# Patient Record
Sex: FEMALE | Race: OTHER | HISPANIC OR LATINO | URBAN - METROPOLITAN AREA
[De-identification: names, ages, dates, MRNs, and addresses within clinical notes are randomized per-mention and may not be internally consistent; named-entity substitution may affect disease eponyms.]

---

## 2022-03-25 ENCOUNTER — EMERGENCY (EMERGENCY)
Facility: HOSPITAL | Age: 27
LOS: 0 days | Discharge: HOME | End: 2022-03-25
Attending: EMERGENCY MEDICINE | Admitting: EMERGENCY MEDICINE
Payer: COMMERCIAL

## 2022-03-25 VITALS
SYSTOLIC BLOOD PRESSURE: 157 MMHG | RESPIRATION RATE: 18 BRPM | DIASTOLIC BLOOD PRESSURE: 98 MMHG | TEMPERATURE: 98 F | OXYGEN SATURATION: 97 % | HEART RATE: 87 BPM

## 2022-03-25 DIAGNOSIS — G43.909 MIGRAINE, UNSPECIFIED, NOT INTRACTABLE, WITHOUT STATUS MIGRAINOSUS: ICD-10-CM

## 2022-03-25 LAB
ALBUMIN SERPL ELPH-MCNC: 4.3 G/DL — SIGNIFICANT CHANGE UP (ref 3.5–5.2)
ALP SERPL-CCNC: 127 U/L — HIGH (ref 30–115)
ALT FLD-CCNC: 41 U/L — SIGNIFICANT CHANGE UP (ref 0–41)
ANION GAP SERPL CALC-SCNC: 12 MMOL/L — SIGNIFICANT CHANGE UP (ref 7–14)
AST SERPL-CCNC: 25 U/L — SIGNIFICANT CHANGE UP (ref 0–41)
BASOPHILS # BLD AUTO: 0.05 K/UL — SIGNIFICANT CHANGE UP (ref 0–0.2)
BASOPHILS NFR BLD AUTO: 0.6 % — SIGNIFICANT CHANGE UP (ref 0–1)
BILIRUB SERPL-MCNC: 0.4 MG/DL — SIGNIFICANT CHANGE UP (ref 0.2–1.2)
BUN SERPL-MCNC: 10 MG/DL — SIGNIFICANT CHANGE UP (ref 10–20)
CALCIUM SERPL-MCNC: 9.2 MG/DL — SIGNIFICANT CHANGE UP (ref 8.5–10.1)
CHLORIDE SERPL-SCNC: 104 MMOL/L — SIGNIFICANT CHANGE UP (ref 98–110)
CO2 SERPL-SCNC: 20 MMOL/L — SIGNIFICANT CHANGE UP (ref 17–32)
CREAT SERPL-MCNC: 0.6 MG/DL — LOW (ref 0.7–1.5)
EGFR: 127 ML/MIN/1.73M2 — SIGNIFICANT CHANGE UP
EOSINOPHIL # BLD AUTO: 0.19 K/UL — SIGNIFICANT CHANGE UP (ref 0–0.7)
EOSINOPHIL NFR BLD AUTO: 2.4 % — SIGNIFICANT CHANGE UP (ref 0–8)
GLUCOSE SERPL-MCNC: 100 MG/DL — HIGH (ref 70–99)
HCG SERPL QL: NEGATIVE — SIGNIFICANT CHANGE UP
HCT VFR BLD CALC: 41.8 % — SIGNIFICANT CHANGE UP (ref 37–47)
HGB BLD-MCNC: 14.3 G/DL — SIGNIFICANT CHANGE UP (ref 12–16)
IMM GRANULOCYTES NFR BLD AUTO: 0.6 % — HIGH (ref 0.1–0.3)
LYMPHOCYTES # BLD AUTO: 2.01 K/UL — SIGNIFICANT CHANGE UP (ref 1.2–3.4)
LYMPHOCYTES # BLD AUTO: 25.2 % — SIGNIFICANT CHANGE UP (ref 20.5–51.1)
MCHC RBC-ENTMCNC: 29.9 PG — SIGNIFICANT CHANGE UP (ref 27–31)
MCHC RBC-ENTMCNC: 34.2 G/DL — SIGNIFICANT CHANGE UP (ref 32–37)
MCV RBC AUTO: 87.3 FL — SIGNIFICANT CHANGE UP (ref 81–99)
MONOCYTES # BLD AUTO: 0.66 K/UL — HIGH (ref 0.1–0.6)
MONOCYTES NFR BLD AUTO: 8.3 % — SIGNIFICANT CHANGE UP (ref 1.7–9.3)
NEUTROPHILS # BLD AUTO: 5.01 K/UL — SIGNIFICANT CHANGE UP (ref 1.4–6.5)
NEUTROPHILS NFR BLD AUTO: 62.9 % — SIGNIFICANT CHANGE UP (ref 42.2–75.2)
NRBC # BLD: 0 /100 WBCS — SIGNIFICANT CHANGE UP (ref 0–0)
PLATELET # BLD AUTO: 259 K/UL — SIGNIFICANT CHANGE UP (ref 130–400)
POTASSIUM SERPL-MCNC: 4.5 MMOL/L — SIGNIFICANT CHANGE UP (ref 3.5–5)
POTASSIUM SERPL-SCNC: 4.5 MMOL/L — SIGNIFICANT CHANGE UP (ref 3.5–5)
PROT SERPL-MCNC: 7 G/DL — SIGNIFICANT CHANGE UP (ref 6–8)
RBC # BLD: 4.79 M/UL — SIGNIFICANT CHANGE UP (ref 4.2–5.4)
RBC # FLD: 12.1 % — SIGNIFICANT CHANGE UP (ref 11.5–14.5)
SODIUM SERPL-SCNC: 136 MMOL/L — SIGNIFICANT CHANGE UP (ref 135–146)
WBC # BLD: 7.97 K/UL — SIGNIFICANT CHANGE UP (ref 4.8–10.8)
WBC # FLD AUTO: 7.97 K/UL — SIGNIFICANT CHANGE UP (ref 4.8–10.8)

## 2022-03-25 PROCEDURE — 99284 EMERGENCY DEPT VISIT MOD MDM: CPT

## 2022-03-25 RX ORDER — METOCLOPRAMIDE HCL 10 MG
10 TABLET ORAL ONCE
Refills: 0 | Status: COMPLETED | OUTPATIENT
Start: 2022-03-25 | End: 2022-03-25

## 2022-03-25 RX ORDER — METOCLOPRAMIDE HCL 10 MG
1 TABLET ORAL
Qty: 120 | Refills: 0
Start: 2022-03-25 | End: 2022-04-23

## 2022-03-25 RX ORDER — KETOROLAC TROMETHAMINE 30 MG/ML
15 SYRINGE (ML) INJECTION ONCE
Refills: 0 | Status: DISCONTINUED | OUTPATIENT
Start: 2022-03-25 | End: 2022-03-25

## 2022-03-25 RX ORDER — SODIUM CHLORIDE 9 MG/ML
1000 INJECTION, SOLUTION INTRAVENOUS ONCE
Refills: 0 | Status: COMPLETED | OUTPATIENT
Start: 2022-03-25 | End: 2022-03-25

## 2022-03-25 RX ADMIN — Medication 15 MILLIGRAM(S): at 15:28

## 2022-03-25 RX ADMIN — Medication 104 MILLIGRAM(S): at 15:28

## 2022-03-25 RX ADMIN — SODIUM CHLORIDE 1000 MILLILITER(S): 9 INJECTION, SOLUTION INTRAVENOUS at 15:28

## 2022-03-25 NOTE — ED PROVIDER NOTE - NSFOLLOWUPCLINICS_GEN_ALL_ED_FT
[facility]  Neurology  [address] [place] [address], [address]  [place] [address]  Phone: [phone]  Fax:

## 2022-03-25 NOTE — ED PROVIDER NOTE - PATIENT PORTAL LINK FT
You can access the FollowMyHealth Patient Portal offered by [facility] by registering at the following website: [email or web address]. By joining [employer]’s FollowMyHealth portal, you will also be able to view your health information using other applications (apps) compatible with our system.

## 2022-03-25 NOTE — ED PROVIDER NOTE - CLINICAL SUMMARY MEDICAL DECISION MAKING FREE TEXT BOX
26 female here for headache. Had screening labs imaging medications and reevaluation, no acute emergent findings, symptoms improved, plan discussed with patient, will discharge with outpatient management and return and follow up instructions.

## 2022-03-25 NOTE — ED PROVIDER NOTE - ATTENDING CONTRIBUTION TO CARE
I personally evaluated the patient. I reviewed the Resident’s or Physician Assistant’s note (as assigned above), and agree with the findings and plan except as documented in my note.     26 female here for eval of headache. Admits to known migraine being followed by outpatient neuro + monthly Rx. Exacerbated by lights and sounds. Refractory to outpatient Rx. Admits to sleep deprivation.     No other symptoms.     ROS otherwise unremarkable    PE: female in no distress. CV: pulses intact. CHEST: normal work of breathing. ABD: nondistended. SKIN: normal. EXT: FROM. NEURO: AAO 3 no focal deficits. HEENT: no droop EOMI no photophobia    Impression: headache    Plan: labs Rx supportive care and reevaluation

## 2022-03-25 NOTE — ED PROVIDER NOTE - OBJECTIVE STATEMENT
26y F pmh migraines presenting with migraine on/off x3 weeks. Pt endorses to getting headaches every day. Exacerbated by lights/sounds. Pt says she saw a neurologist in [place], but has not followed up since. No f/c/n/v. No cough/sore throat. No congestion.

## 2022-03-25 NOTE — ED PROVIDER NOTE - PROGRESS NOTE DETAILS
Pain has resolved. Pt endorses to feeling better and wants to go home. Given strict return precautions and encouraged to follow up with neurology -friedman

## 2022-04-02 ENCOUNTER — EMERGENCY (EMERGENCY)
Facility: HOSPITAL | Age: 27
LOS: 0 days | Discharge: HOME | End: 2022-04-02
Attending: EMERGENCY MEDICINE | Admitting: EMERGENCY MEDICINE
Payer: COMMERCIAL

## 2022-04-02 VITALS
RESPIRATION RATE: 18 BRPM | SYSTOLIC BLOOD PRESSURE: 129 MMHG | DIASTOLIC BLOOD PRESSURE: 63 MMHG | TEMPERATURE: 98 F | OXYGEN SATURATION: 98 % | HEART RATE: 62 BPM

## 2022-04-02 VITALS
RESPIRATION RATE: 16 BRPM | WEIGHT: 184.97 LBS | DIASTOLIC BLOOD PRESSURE: 98 MMHG | TEMPERATURE: 98 F | OXYGEN SATURATION: 99 % | HEART RATE: 73 BPM | SYSTOLIC BLOOD PRESSURE: 171 MMHG

## 2022-04-02 DIAGNOSIS — F41.9 ANXIETY DISORDER, UNSPECIFIED: ICD-10-CM

## 2022-04-02 DIAGNOSIS — R51.9 HEADACHE, UNSPECIFIED: ICD-10-CM

## 2022-04-02 DIAGNOSIS — G43.909 MIGRAINE, UNSPECIFIED, NOT INTRACTABLE, WITHOUT STATUS MIGRAINOSUS: ICD-10-CM

## 2022-04-02 PROCEDURE — 99284 EMERGENCY DEPT VISIT MOD MDM: CPT

## 2022-04-02 RX ORDER — METOCLOPRAMIDE HCL 10 MG
10 TABLET ORAL ONCE
Refills: 0 | Status: COMPLETED | OUTPATIENT
Start: 2022-04-02 | End: 2022-04-02

## 2022-04-02 RX ORDER — KETOROLAC TROMETHAMINE 30 MG/ML
30 SYRINGE (ML) INJECTION ONCE
Refills: 0 | Status: DISCONTINUED | OUTPATIENT
Start: 2022-04-02 | End: 2022-04-02

## 2022-04-02 RX ORDER — KETOROLAC TROMETHAMINE 30 MG/ML
15 SYRINGE (ML) INJECTION ONCE
Refills: 0 | Status: DISCONTINUED | OUTPATIENT
Start: 2022-04-02 | End: 2022-04-02

## 2022-04-02 RX ORDER — SODIUM CHLORIDE 9 MG/ML
1000 INJECTION, SOLUTION INTRAVENOUS ONCE
Refills: 0 | Status: DISCONTINUED | OUTPATIENT
Start: 2022-04-02 | End: 2022-04-02

## 2022-04-02 RX ORDER — DIPHENHYDRAMINE HCL 50 MG
50 CAPSULE ORAL ONCE
Refills: 0 | Status: COMPLETED | OUTPATIENT
Start: 2022-04-02 | End: 2022-04-02

## 2022-04-02 RX ORDER — ACETAMINOPHEN 500 MG
650 TABLET ORAL ONCE
Refills: 0 | Status: COMPLETED | OUTPATIENT
Start: 2022-04-02 | End: 2022-04-02

## 2022-04-02 RX ORDER — METOCLOPRAMIDE HCL 10 MG
10 TABLET ORAL ONCE
Refills: 0 | Status: DISCONTINUED | OUTPATIENT
Start: 2022-04-02 | End: 2022-04-02

## 2022-04-02 RX ADMIN — Medication 650 MILLIGRAM(S): at 20:59

## 2022-04-02 RX ADMIN — Medication 50 MILLIGRAM(S): at 22:10

## 2022-04-02 RX ADMIN — Medication 30 MILLIGRAM(S): at 21:20

## 2022-04-02 RX ADMIN — Medication 10 MILLIGRAM(S): at 21:21

## 2022-04-02 RX ADMIN — Medication 30 MILLIGRAM(S): at 22:02

## 2022-04-02 RX ADMIN — Medication 650 MILLIGRAM(S): at 22:03

## 2022-04-02 NOTE — ED PROVIDER NOTE - PATIENT PORTAL LINK FT
You can access the FollowMyHealth Patient Portal offered by [facility] by registering at the following website: [email or web address]. By joining [employer]’s FollowMyHealth portal, you will also be able to view your health information using other applications (apps) compatible with our system.

## 2022-04-02 NOTE — ED ADULT NURSE NOTE - CHIEF COMPLAINT QUOTE
Headache on and off since march but worsened today to 8/10 today. C/O of dizziness as well, NIH-0 in triage.

## 2022-04-02 NOTE — ED PROVIDER NOTE - NSFOLLOWUPCLINICS_GEN_ALL_ED_FT
[facility]  Neurology  [address] [place] [address], [facility] [address]  [place] [address]  Phone: [phone]  Fax:   Follow Up Time: 1-3 Days

## 2022-04-02 NOTE — ED PROVIDER NOTE - CLINICAL SUMMARY MEDICAL DECISION MAKING FREE TEXT BOX
27 yo woman with recurrent migraines presents with worsening headache similar to prior migraines.  Patient has been closely followed in [place] for her headaches.  However, just moved in [place] and currently has no follow up.  Neurologically intact.  Improved with meds.  Outpatient follow up provided.

## 2022-04-02 NOTE — ED PROVIDER NOTE - NS ED ROS FT
Constitutional: No fevers, chills, or malaise.  HEENT: Reports headache.   Cardiac:  No chest pain, SOB, leg edema, or leg pain.  Respiratory:  No cough, respiratory distress, or hemoptysis.  GI:  No nausea, vomiting, diarrhea, or abdominal pain.  :  No dysuria, frequency, or urgency.  MS:  No myalgia, muscle weakness, joint pain or back pain.  Neuro:  No dizziness, LOC, paralysis, or N/T.  Skin:  No skin rash.   Endocrine: No polyuria, polyphagia, or polydipsia.

## 2022-04-02 NOTE — ED ADULT TRIAGE NOTE - CHIEF COMPLAINT QUOTE
Headache since march but worsened to 8/10 today. C/O of dizziness as well, NIH-0 in triage. Headache on and off since march but worsened today to 8/10 today. C/O of dizziness as well, NIH-0 in triage.

## 2022-04-02 NOTE — ED PROVIDER NOTE - PROGRESS NOTE DETAILS
Scarlet: pt refusing IV, will give IM meds and reassess Scarlet: States headache improved, would like to be DCed

## 2022-04-02 NOTE — ED PROVIDER NOTE - PHYSICAL EXAMINATION
GENERAL: NAD   SKIN: warm, dry  HEAD: Normocephalic; atraumatic.  EYES: PERRLA, EOMI, no conjunctival erythema  ENT: No nasal discharge; airway clear.  NECK: Supple; non tender.  CARD: S1, S2 normal; no murmurs, gallops, or rubs. Regular rate and rhythm.   RESP: LCTAB; No wheezes, rales, rhonchi, or stridor.  NEURO: Alert, oriented, grossly unremarkable. Strength 5/5 and sensation intact in bilateral UE and LEs. CN2-12 grossly intact. Normal gait   PSYCH: Cooperative, appropriate.

## 2022-04-02 NOTE — ED PROVIDER NOTE - OBJECTIVE STATEMENT
27 yo female w/ PMH of migraines, anxiety presents for headache x 3 days. No inciting event or trauma, worse with light, radiates from back of head, similar to previous migraines she has had in the past. Was seen a week ago for similar complaint, was seeing neurologist in [place] but currently doesn't follow a neurologist.  States for past month migraines have been worse, 2 months ago stopped unknown antidepressant she was taking for anxiety.

## 2022-04-28 PROBLEM — Z00.00 ENCOUNTER FOR PREVENTIVE HEALTH EXAMINATION: Status: ACTIVE | Noted: 2022-04-28

## 2022-05-05 ENCOUNTER — APPOINTMENT (OUTPATIENT)
Dept: NEUROLOGY | Facility: CLINIC | Age: 27
End: 2022-05-05
Payer: COMMERCIAL

## 2022-05-05 VITALS
HEART RATE: 87 BPM | SYSTOLIC BLOOD PRESSURE: 156 MMHG | DIASTOLIC BLOOD PRESSURE: 109 MMHG | OXYGEN SATURATION: 99 % | TEMPERATURE: 97.8 F | HEIGHT: 64 IN | WEIGHT: 190 LBS | BODY MASS INDEX: 32.44 KG/M2

## 2022-05-05 PROCEDURE — 99215 OFFICE O/P EST HI 40 MIN: CPT

## 2022-05-05 RX ORDER — FLUOXETINE HYDROCHLORIDE 10 MG/1
10 CAPSULE ORAL
Qty: 30 | Refills: 1 | Status: DISCONTINUED | COMMUNITY
Start: 2022-05-05 | End: 2022-05-05

## 2022-05-13 ENCOUNTER — OUTPATIENT (OUTPATIENT)
Dept: OUTPATIENT SERVICES | Facility: HOSPITAL | Age: 27
LOS: 1 days | Discharge: HOME | End: 2022-05-13
Payer: COMMERCIAL

## 2022-05-13 DIAGNOSIS — G43.909 MIGRAINE, UNSPECIFIED, NOT INTRACTABLE, WITHOUT STATUS MIGRAINOSUS: ICD-10-CM

## 2022-05-13 PROCEDURE — 70551 MRI BRAIN STEM W/O DYE: CPT | Mod: 26

## 2022-05-13 PROCEDURE — 70544 MR ANGIOGRAPHY HEAD W/O DYE: CPT | Mod: 26,59

## 2022-05-16 ENCOUNTER — OUTPATIENT (OUTPATIENT)
Dept: OUTPATIENT SERVICES | Facility: HOSPITAL | Age: 27
LOS: 1 days | Discharge: HOME | End: 2022-05-16
Payer: COMMERCIAL

## 2022-05-16 DIAGNOSIS — G43.909 MIGRAINE, UNSPECIFIED, NOT INTRACTABLE, WITHOUT STATUS MIGRAINOSUS: ICD-10-CM

## 2022-05-16 PROCEDURE — 70544 MR ANGIOGRAPHY HEAD W/O DYE: CPT | Mod: 26

## 2022-05-19 ENCOUNTER — TRANSCRIPTION ENCOUNTER (OUTPATIENT)
Age: 27
End: 2022-05-19

## 2022-05-25 ENCOUNTER — NON-APPOINTMENT (OUTPATIENT)
Age: 27
End: 2022-05-25

## 2022-05-25 NOTE — ASSESSMENT
[FreeTextEntry1] : Patient is 27 yo RH woman, recently moved to [place] from [place], with PMHx of migraines, anxiety, ?BPD who presents as ED f/u from 4/2/22 for what seem to be her migraines. Her depression and anxiety may be exacerbating her sx and also giving tension HAs. \par \par PLAN:\par -Propranolol 20 for 2 weeks then BID thereafter \par -MRI/MRA/MRV \par -Referral to Psych/Therapy/, will reach out to  to expedite  \par -Referral to Dr. Geovanny Issa \par -F/u with Dr. Manzo in 1 mo once neuroimaging is completed \par

## 2022-05-25 NOTE — HISTORY OF PRESENT ILLNESS
[FreeTextEntry1] : Patient is 27 yo RH woman, recently moved to [place] from [place], with PMHx of migraines, anxiety who presents as ED f/u from 4/2/22 for HAs x3 days similar to her previous migraines. Says for the past month migraines have been worse. 2 months ago she stopped unknown antidepressant for anxiety.  \par \par Today, says she was not formally dx with migraines and she lost $20,000 scholarship because of her HAs. She went for MRI/MRA/MRV/CTs at age 17 with Neurologist in [place] and saw ?aneurysm, but was told it was very small. She had MRI H a few years ago. Says memory is "shot" bc of the HAs. \par \par PCP in [place] placed her on Emgality 120mg 1 injection every 28 days. It was working, but she stopped it for a while bc of insurance coverage issues, but is retaking it now and its seems not to be working.  She has tried Topiramate, Nortriptyline. Currently working at [employer], packaging. Highest level of education is High School. Wanted to study mathematics. Father passed away when she was young, doesn't want to talk to mother who works in factory. Has sister is in [facility] and Brother is in [place], both older and not in touch with them. \par \par Living with fiance now, but barely working/making ends meet\par I asked if sometimes her emotions are high and other times low and she stated sometimes. \par \par -Localization: Sometimes one-sided, sometime holocephalic, sometimes only occipital \par -Time pattern: HAs started at age 14. Occurs about 6 days a week with varying severity. Sometimes able to work through it, but sometimes debilitated and neck become very stiff. Gets auras once every couple month (like vision change)\par -Onset: HA usually waker her up. Sometimes HA gets worse, sometimes on/off\par -Circadian distribution: Random \par -Course and progression: Constant \par -Duration: Last hours (all day) \par -Quality: One-sided HAs are stabbing supraorbital, Occipital HA is throbbing, Holocephalic HAs are different types of pain. \par -Intensity: HA is on average is 6-7/10 \par -Precipitating and alleviating factors: Stress aggravates (hard to say bc HA present all the time); Rest/lying down helps\par -Autonomic sx: Twice, had L nasal dripping\par -Associated sx and signs: Has photophobia, phonophobia depending on severity of HA. \par \par PHQ 9 shows severe depression. She denies any suicide attempts or plans today. Has to go to work after today's visit.  \par \par \par \par \par

## 2022-06-07 ENCOUNTER — APPOINTMENT (OUTPATIENT)
Dept: NEUROLOGY | Facility: CLINIC | Age: 27
End: 2022-06-07
Payer: COMMERCIAL

## 2022-06-07 ENCOUNTER — NON-APPOINTMENT (OUTPATIENT)
Age: 27
End: 2022-06-07

## 2022-06-07 VITALS
DIASTOLIC BLOOD PRESSURE: 91 MMHG | WEIGHT: 190 LBS | OXYGEN SATURATION: 98 % | HEIGHT: 64 IN | BODY MASS INDEX: 32.44 KG/M2 | SYSTOLIC BLOOD PRESSURE: 131 MMHG | HEART RATE: 81 BPM | TEMPERATURE: 97 F

## 2022-06-07 PROCEDURE — 99214 OFFICE O/P EST MOD 30 MIN: CPT | Mod: GC

## 2022-06-07 NOTE — ASSESSMENT
[FreeTextEntry1] : JOHN CURRY is a 26 year old woman with history of headaches since age of 14 is here as a follow up visit and new patient for me for headache management. She was diagnosed with migraine when she was in [place] and was started on Emgality and us currently on Emgality every month and propanol 40 mg BID. Report headache almost every day. The location, severity and associated symptoms could change each time. Recent Brain MRI, MRA head and MRV were unremarkable. \par \par - Start insurance process for Botox \par - Continue Emgality \par - Start magnesium and Riboflavin \par - Reports depressed and anxious mood. Recommend a visit to psychiatrist \par - Will give prednisone taper for one week\par - RTC in 2-3 months

## 2022-06-07 NOTE — HISTORY OF PRESENT ILLNESS
[FreeTextEntry1] : JOHN CURRY is a 26 year old woman is here as a new patient to me referred by ALAN Calles. She was seen once on May 2022. Per report she recently moved to [place] from [place], with PMHx of migraines and anxiety, She presented to  ED f/u from 4/2/22 for HAs x3 days similar to her previous migraines. \par \par PCP in [place] placed her on Emgality 120mg 1 injection every 28 days. It was working, but she stopped it for a while bc of insurance coverage issues, but is retaking it now and its seems not to be working. She has tried Topiramate, Nortriptyline. Currently working at [employer], packaging. Highest level of education is High School. Wanted to study mathematics. Father passed away when she was young, doesn't want to talk to mother who works in factory. Has sister is in [facility] and Brother is in [place], both older and not in touch with them. \par \par Headache are  sometimes one-sided, sometime holocephalic, sometimes only occipital \par -Time pattern: HAs started at age 14. Occurs about 6 days a week with varying severity. Sometimes able to work through it, but sometimes debilitated and neck become very stiff. Gets auras once every couple month (like vision change). Onset: ORR usually waker her up. Sometimes HA gets worse, sometimes on/off\par -Duration: Last hours (all day) \par -Quality: One-sided HAs are stabbing supraorbital, Occipital HA is throbbing, Holocephalic HAs are different types of pain.  -Intensity: HA is on average is 6-7/10  -Precipitating and alleviating factors: Stress aggravates (hard to say bc HA present all the time); Rest/lying down helps -Autonomic sx: Twice, had L nasal dripping -Associated sx and signs: Has photophobia, phonophobia depending on severity of HA. \par \par Recent studies include unremarkable Brain MRI, MRA head and normal MRV.

## 2022-06-07 NOTE — PHYSICAL EXAM
[FreeTextEntry1] : Mental status: Awake, alert and oriented x3.  Recent and remote memory intact.  Naming, repetition and comprehension intact.  Attention/concentration intact.  No dysarthria, no aphasia.  Fund of knowledge appropriate.  \par Cranial nerves: Pupils equally round and reactive to light, visual fields full, no nystagmus, extraocular muscles intact, V1 through V3 intact bilaterally and symmetric, face symmetric, hearing intact to finger rub, palate elevation symmetric, tongue was midline.\par Motor:  MRC grading 5/5 b/l UE/LE.   strength 5/5.  Normal tone and bulk.  No abnormal movements.  \par Sensation: Intact to light touch, proprioception, and pinprick. \par Coordination: No dysmetria on finger-to-nose and heel-to-shin.  No dysdiadokinesia.\par Reflexes: 2+ in bilateral UE/LE, downgoing toes bilaterally. (-) Gan.\par Gait: Narrow and steady. No ataxia.  Romberg negative\par \par

## 2022-10-13 ENCOUNTER — APPOINTMENT (OUTPATIENT)
Dept: NEUROLOGY | Facility: CLINIC | Age: 27
End: 2022-10-13
Payer: COMMERCIAL

## 2022-10-13 PROCEDURE — 64615 CHEMODENERV MUSC MIGRAINE: CPT

## 2022-10-13 PROCEDURE — 99213 OFFICE O/P EST LOW 20 MIN: CPT | Mod: 25

## 2022-10-13 NOTE — PHYSICAL EXAM
[FreeTextEntry1] : Mental status: Awake, alert and oriented x3. Recent and remote memory intact. Naming, repetition and comprehension intact. Attention/concentration intact. No dysarthria, no aphasia. Fund of knowledge appropriate. \par Cranial nerves: Pupils equally round and reactive to light, visual fields full, no nystagmus, extraocular muscles intact, V1 through V3 intact bilaterally and symmetric, face symmetric, hearing intact to finger rub, palate elevation symmetric, tongue was midline.\par Motor: MRC grading 5/5 b/l UE/LE.  strength 5/5. Normal tone and bulk. No abnormal movements. \par Sensation: Intact to light touch, proprioception, and pinprick. \par Coordination: No dysmetria on finger-to-nose and heel-to-shin. No dysdiadokinesia.\par Reflexes: 2+ in bilateral UE/LE, downgoing toes bilaterally. (-) Gan.\par Gait: Narrow and steady. No ataxia. Romberg negative

## 2022-10-13 NOTE — ASSESSMENT
[FreeTextEntry1] : 27 year old woman with history of headaches since age of 14 is here as a follow up visit and new patient for me for headache management. She was diagnosed with migraine when she was in [place] and was started on Emgality and us currently on Emgality every month and propanol 40 mg BID. Report headache almost every day.  Recent Brain MRI, MRA head and MRV were unremarkable. She tolerated the first cession of Botox today \par \par - Continue Botox every three months \par - Continue Emgality \par - Start magnesium and Riboflavin \par - RTC in 3 months. \par

## 2022-10-13 NOTE — PROCEDURE
[FreeTextEntry1] : Botulinum toxin for chronic migraine\par \par Risks and benefits of procedure explained. Consent for botulinum toxin for chronic migraine signed in chart. \par \par 200 Units botulinum toxin (Lot # [id number], exp: 08/2024 ) reconstituted with 4 ml 0.9NS (5U/0.1cc). \par \par Patient prepped with alcohol/ethyl chloride. 10U corrugators, 5U procerus, 20U frontalis, 40 temporalis, 30 occipitalis, 20 cervical paraspinals, 30 trapezius. Total of 155U divided in 31 sites. 45U botulinum wasted. \par \par Patient tolerated procedure well without complications. \par

## 2022-10-13 NOTE — HISTORY OF PRESENT ILLNESS
[FreeTextEntry1] : JOHN CURRY is a 27 year old woman is here as a follow up visit for daily headache, migraine and Botox injection.\par \par [place] on Emgality 120mg 1 injection every 28 days. It was working, but she stopped it for a while bc of insurance coverage issues, but is retaking it now and its seems not to be working. She has tried Topiramate, Nortriptyline.\par \par Headache are sometimes one-sided, sometime holocephalic, sometimes only occipital \par -Time pattern: HAs started at age 14. Occurs about 6 days a week with varying severity. Sometimes able to work through it, but sometimes debilitated and neck become very stiff. Gets auras once every couple month (like vision change). Onset: ORR usually waker her up. Sometimes HA gets worse, sometimes on/off\par -Duration: Last hours (all day) \par -Quality: One-sided HAs are stabbing supraorbital, Occipital HA is throbbing, Holocephalic HAs are different types of pain. -Intensity: HA is on average is 6-7/10 -Precipitating and alleviating factors: Stress aggravates (hard to say bc HA present all the time); Rest/lying down helps -Autonomic sx: Twice, had L nasal dripping -Associated sx and signs: Has photophobia, phonophobia depending on severity of HA. \par \par Recent studies include unremarkable Brain MRI, MRA head and normal MRV. \par \par Inderal is not helping either so she is going to have her first Botox injection.

## 2022-12-08 ENCOUNTER — NON-APPOINTMENT (OUTPATIENT)
Age: 27
End: 2022-12-08

## 2022-12-12 RX ORDER — PROPRANOLOL HYDROCHLORIDE 20 MG/1
20 TABLET ORAL
Qty: 60 | Refills: 1 | Status: DISCONTINUED | COMMUNITY
Start: 2022-05-05 | End: 2022-12-12

## 2023-03-01 ENCOUNTER — NON-APPOINTMENT (OUTPATIENT)
Age: 28
End: 2023-03-01

## 2023-03-02 ENCOUNTER — APPOINTMENT (OUTPATIENT)
Dept: NEUROLOGY | Facility: CLINIC | Age: 28
End: 2023-03-02
Payer: COMMERCIAL

## 2023-03-02 PROCEDURE — 64615 CHEMODENERV MUSC MIGRAINE: CPT

## 2023-03-02 PROCEDURE — 99214 OFFICE O/P EST MOD 30 MIN: CPT | Mod: 25

## 2023-03-02 RX ADMIN — ONABOTULINUMTOXINA 55 UNIT: 100 INJECTION, POWDER, LYOPHILIZED, FOR SOLUTION INTRADERMAL; INTRAMUSCULAR at 00:00

## 2023-03-02 RX ADMIN — ONABOTULINUMTOXINA 1 UNIT: 100 INJECTION, POWDER, LYOPHILIZED, FOR SOLUTION INTRADERMAL; INTRAMUSCULAR at 00:00

## 2023-03-02 NOTE — ASSESSMENT
[FreeTextEntry1] : 27 year old woman with history of headaches since age of 14 is here as a follow up visit and new patient for me for headache management. She was diagnosed with migraine when she was in [place] and was started on Emgality and us currently on Emgality every month and propanol 40 mg BID. Did not notice that much of improvement after the first cession. Today tolerated the injection very well. Will decide about adding another agent at the next visit \par \par \par - Continue Botox every three months \par - Continue Emgality \par - cw magnesium and Riboflavin \par - RTC in 3 months.

## 2023-03-02 NOTE — HISTORY OF PRESENT ILLNESS
[FreeTextEntry1] : JOHN CURRY is a 27 year old woman is here as a follow up visit for chronic migraine and Botox injection. \par \par [place] on Emgality 120mg 1 injection every 28 days. It was working, but she stopped it for a while bc of insurance coverage issues, but is retaking it now and its seems not to be working. She has tried Topiramate, Nortriptyline.\par \par Headache are sometimes one-sided, sometime holocephalic, sometimes only occipital \par -Time pattern: HAs started at age 14. Occurs about 6 days a week with varying severity. Sometimes able to work through it, but sometimes debilitated and neck become very stiff. Gets auras once every couple month (like vision change). Onset: ORR usually waker her up. Sometimes HA gets worse, sometimes on/off\par -Duration: Last hours (all day) \par -Quality: One-sided HAs are stabbing supraorbital, Occipital HA is throbbing, Holocephalic HAs are different types of pain. -Intensity: HA is on average is 6-7/10 -Precipitating and alleviating factors: Stress aggravates (hard to say bc HA present all the time); Rest/lying down helps -Autonomic sx: Twice, had L nasal dripping -Associated sx and signs: Has photophobia, phonophobia depending on severity of HA. \par \par Recent studies include unremarkable Brain MRI, MRA head and normal MRV. \par \par Today is her second cession of Botox injection. Reports that Botox is not very effective and still frequent headache per month for at least 14 days a month. \par \par

## 2023-03-02 NOTE — PROCEDURE
[FreeTextEntry1] : Botulinum toxin for chronic migraine\par \par Risks and benefits of procedure explained. Consent for botulinum toxin for chronic migraine signed in chart. \par \par 200 Units botulinum toxin (Lot # [id number], exp: 03/2025 ) reconstituted with 4 ml 0.9NS (5U/0.1cc). \par \par Patient prepped with alcohol/ethyl chloride. 10U corrugators, 5U procerus, 20U frontalis, 40 temporalis, 30 occipitalis, 20 cervical paraspinals, 30 trapezius. Total of 155U divided in 31 sites. 45U botulinum wasted. \par \par Patient tolerated procedure well without complications. \par 
PAST MEDICAL HISTORY:  Asthma

## 2023-06-16 ENCOUNTER — NON-APPOINTMENT (OUTPATIENT)
Age: 28
End: 2023-06-16

## 2023-06-22 ENCOUNTER — INPATIENT (INPATIENT)
Facility: HOSPITAL | Age: 28
LOS: 10 days | Discharge: HOME | DRG: 881 | End: 2023-07-03
Attending: PSYCHIATRY & NEUROLOGY | Admitting: PSYCHIATRY & NEUROLOGY
Payer: COMMERCIAL

## 2023-06-22 VITALS
HEIGHT: 64 IN | TEMPERATURE: 98 F | DIASTOLIC BLOOD PRESSURE: 113 MMHG | WEIGHT: 184.09 LBS | OXYGEN SATURATION: 97 % | RESPIRATION RATE: 18 BRPM | SYSTOLIC BLOOD PRESSURE: 157 MMHG | HEART RATE: 108 BPM

## 2023-06-22 PROCEDURE — 99285 EMERGENCY DEPT VISIT HI MDM: CPT

## 2023-06-22 NOTE — ED ADULT TRIAGE NOTE - CHIEF COMPLAINT QUOTE
" " My fiancee said I want to hurt myself." + cuts to left arm, last done yesterday and feeling depressed, on medications. 1:1 initiated in triage

## 2023-06-22 NOTE — ED ADULT TRIAGE NOTE - ACCOMPANIED BY
Patient Instructions by Manuel Vitale MD at 1/22/2020  7:00 PM     Author: Manuel Vitale MD Service: -- Author Type: Physician    Filed: 1/22/2020  7:32 PM Encounter Date: 1/22/2020 Status: Signed    : Manuel Vitale MD (Physician)          Patient Education      [facility] HANDOUT- PATIENT  11 THROUGH 14 YEAR VISITS  Here are some suggestions from [employer] experts that may be of value to your family.     HOW YOU ARE DOING  Enjoy spending time with your family. Look for ways to help out at home.  Follow your familys rules.  Try to be responsible for your schoolwork.  If you need help getting organized, ask your parents or teachers.  Try to read every day.  Find activities you are really interested in, such as sports or theater.  Find activities that help others.  Figure out ways to deal with stress in ways that work for you.  Dont smoke, vape, use drugs, or drink alcohol. Talk with us if you are worried about alcohol or drug use in your family.  Always talk through problems and never use violence.  If you get angry with someone, try to walk away.    HEALTHY BEHAVIOR CHOICES  Find fun, safe things to do.  Talk with your parents about alcohol and drug use.  Say No! to drugs, alcohol, cigarettes and e-cigarettes, and sex. Saying No! is OK.  Dont share your prescription medicines; dont use other peoples medicines.  Choose friends who support your decision not to use tobacco, alcohol, or drugs. Support friends who choose not to use.  Healthy dating relationships are built on respect, concern, and doing things both of you like to do.  Talk with your parents about relationships, sex, and values.  Talk with your parents or another adult you trust about puberty and sexual pressures. Have a plan for how you will handle risky situations.    YOUR GROWING AND CHANGING BODY  Brush your teeth twice a day and floss once a day.  Visit the dentist twice a year.  Wear a mouth guard when playing sports.  Be a  healthy eater. It helps you do well in school and sports.  Have vegetables, fruits, lean protein, and whole grains at meals and snacks.  Limit fatty, sugary, salty foods that are low in nutrients, such as candy, chips, and ice cream.  Eat when youre hungry. Stop when you feel satisfied.  Eat with your family often.  Eat breakfast.  Choose water instead of soda or sports drinks.  Aim for at least 1 hour of physical activity every day.  Get enough sleep.    YOUR FEELINGS  Be proud of yourself when you do something good.  Its OK to have up-and-down moods, but if you feel sad most of the time, let us know so we can help you.  Its important for you to have accurate information about sexuality, your physical development, and your sexual feelings toward the opposite or same sex. Ask us if you have any questions.    STAYING SAFE  Always wear your lap and shoulder seat belt.  Wear protective gear, including helmets, for playing sports, biking, skating, skiing, and skateboarding.  Always wear a life jacket when you do water sports.  Always use sunscreen and a hat when youre outside. Try not to be outside for too long between 11:00 am and 3:00 pm, when its easy to get a sunburn.  Dont ride ATVs.  Dont ride in a car with someone who has used alcohol or drugs. Call your parents or another trusted adult if you are feeling unsafe.  Fighting and carrying weapons can be dangerous. Talk with your parents, teachers, or doctor about how to avoid these situations.      Consistent with Bright Futures: Guidelines for Health Supervision of Infants, Children, and Adolescents, 4th Edition  For more information, go to https://brightfutures.aap.org.                   Spouse/Significant other

## 2023-06-23 DIAGNOSIS — F32.9 MAJOR DEPRESSIVE DISORDER, SINGLE EPISODE, UNSPECIFIED: ICD-10-CM

## 2023-06-23 DIAGNOSIS — F43.10 POST-TRAUMATIC STRESS DISORDER, UNSPECIFIED: ICD-10-CM

## 2023-06-23 DIAGNOSIS — F41.1 GENERALIZED ANXIETY DISORDER: ICD-10-CM

## 2023-06-23 LAB
ALBUMIN SERPL ELPH-MCNC: 5 G/DL — SIGNIFICANT CHANGE UP (ref 3.5–5.2)
ALP SERPL-CCNC: 106 U/L — SIGNIFICANT CHANGE UP (ref 30–115)
ALT FLD-CCNC: 35 U/L — SIGNIFICANT CHANGE UP (ref 0–41)
ANION GAP SERPL CALC-SCNC: 15 MMOL/L — HIGH (ref 7–14)
APAP SERPL-MCNC: <5 UG/ML — LOW (ref 10–30)
AST SERPL-CCNC: 22 U/L — SIGNIFICANT CHANGE UP (ref 0–41)
BASOPHILS # BLD AUTO: 0.05 K/UL — SIGNIFICANT CHANGE UP (ref 0–0.2)
BASOPHILS NFR BLD AUTO: 0.6 % — SIGNIFICANT CHANGE UP (ref 0–1)
BILIRUB SERPL-MCNC: 0.5 MG/DL — SIGNIFICANT CHANGE UP (ref 0.2–1.2)
BUN SERPL-MCNC: 13 MG/DL — SIGNIFICANT CHANGE UP (ref 10–20)
CALCIUM SERPL-MCNC: 9.7 MG/DL — SIGNIFICANT CHANGE UP (ref 8.4–10.5)
CHLORIDE SERPL-SCNC: 99 MMOL/L — SIGNIFICANT CHANGE UP (ref 98–110)
CO2 SERPL-SCNC: 22 MMOL/L — SIGNIFICANT CHANGE UP (ref 17–32)
CREAT SERPL-MCNC: 0.8 MG/DL — SIGNIFICANT CHANGE UP (ref 0.7–1.5)
EGFR: 104 ML/MIN/1.73M2 — SIGNIFICANT CHANGE UP
EOSINOPHIL # BLD AUTO: 0.25 K/UL — SIGNIFICANT CHANGE UP (ref 0–0.7)
EOSINOPHIL NFR BLD AUTO: 3 % — SIGNIFICANT CHANGE UP (ref 0–8)
ETHANOL SERPL-MCNC: <10 MG/DL — SIGNIFICANT CHANGE UP
GLUCOSE SERPL-MCNC: 91 MG/DL — SIGNIFICANT CHANGE UP (ref 70–99)
HCT VFR BLD CALC: 46 % — SIGNIFICANT CHANGE UP (ref 37–47)
HGB BLD-MCNC: 15.7 G/DL — SIGNIFICANT CHANGE UP (ref 12–16)
IMM GRANULOCYTES NFR BLD AUTO: 0.2 % — SIGNIFICANT CHANGE UP (ref 0.1–0.3)
LYMPHOCYTES # BLD AUTO: 1.63 K/UL — SIGNIFICANT CHANGE UP (ref 1.2–3.4)
LYMPHOCYTES # BLD AUTO: 19.7 % — LOW (ref 20.5–51.1)
MCHC RBC-ENTMCNC: 29.1 PG — SIGNIFICANT CHANGE UP (ref 27–31)
MCHC RBC-ENTMCNC: 34.1 G/DL — SIGNIFICANT CHANGE UP (ref 32–37)
MCV RBC AUTO: 85.2 FL — SIGNIFICANT CHANGE UP (ref 81–99)
MONOCYTES # BLD AUTO: 0.63 K/UL — HIGH (ref 0.1–0.6)
MONOCYTES NFR BLD AUTO: 7.6 % — SIGNIFICANT CHANGE UP (ref 1.7–9.3)
NEUTROPHILS # BLD AUTO: 5.7 K/UL — SIGNIFICANT CHANGE UP (ref 1.4–6.5)
NEUTROPHILS NFR BLD AUTO: 68.9 % — SIGNIFICANT CHANGE UP (ref 42.2–75.2)
NRBC # BLD: 0 /100 WBCS — SIGNIFICANT CHANGE UP (ref 0–0)
PLATELET # BLD AUTO: 249 K/UL — SIGNIFICANT CHANGE UP (ref 130–400)
PMV BLD: 12.4 FL — HIGH (ref 7.4–10.4)
POTASSIUM SERPL-MCNC: 3.9 MMOL/L — SIGNIFICANT CHANGE UP (ref 3.5–5)
POTASSIUM SERPL-SCNC: 3.9 MMOL/L — SIGNIFICANT CHANGE UP (ref 3.5–5)
PROT SERPL-MCNC: 8 G/DL — SIGNIFICANT CHANGE UP (ref 6–8)
RBC # BLD: 5.4 M/UL — SIGNIFICANT CHANGE UP (ref 4.2–5.4)
RBC # FLD: 12.1 % — SIGNIFICANT CHANGE UP (ref 11.5–14.5)
SALICYLATES SERPL-MCNC: <0.3 MG/DL — LOW (ref 4–30)
SARS-COV-2 RNA SPEC QL NAA+PROBE: SIGNIFICANT CHANGE UP
SARS-COV-2 RNA SPEC QL NAA+PROBE: SIGNIFICANT CHANGE UP
SODIUM SERPL-SCNC: 136 MMOL/L — SIGNIFICANT CHANGE UP (ref 135–146)
WBC # BLD: 8.28 K/UL — SIGNIFICANT CHANGE UP (ref 4.8–10.8)
WBC # FLD AUTO: 8.28 K/UL — SIGNIFICANT CHANGE UP (ref 4.8–10.8)

## 2023-06-23 PROCEDURE — 83036 HEMOGLOBIN GLYCOSYLATED A1C: CPT

## 2023-06-23 PROCEDURE — 84443 ASSAY THYROID STIM HORMONE: CPT

## 2023-06-23 PROCEDURE — 83735 ASSAY OF MAGNESIUM: CPT

## 2023-06-23 PROCEDURE — 80061 LIPID PANEL: CPT

## 2023-06-23 PROCEDURE — 90792 PSYCH DIAG EVAL W/MED SRVCS: CPT | Mod: 95,GC

## 2023-06-23 PROCEDURE — 80053 COMPREHEN METABOLIC PANEL: CPT

## 2023-06-23 PROCEDURE — 36415 COLL VENOUS BLD VENIPUNCTURE: CPT

## 2023-06-23 PROCEDURE — 85025 COMPLETE CBC W/AUTO DIFF WBC: CPT

## 2023-06-23 RX ORDER — TRAZODONE HCL 50 MG
50 TABLET ORAL AT BEDTIME
Refills: 0 | Status: DISCONTINUED | OUTPATIENT
Start: 2023-06-23 | End: 2023-07-03

## 2023-06-23 RX ORDER — VENLAFAXINE HCL 75 MG
150 CAPSULE, EXT RELEASE 24 HR ORAL DAILY
Refills: 0 | Status: DISCONTINUED | OUTPATIENT
Start: 2023-06-23 | End: 2023-06-26

## 2023-06-23 RX ORDER — HYDROXYZINE HCL 10 MG
50 TABLET ORAL EVERY 6 HOURS
Refills: 0 | Status: DISCONTINUED | OUTPATIENT
Start: 2023-06-23 | End: 2023-07-03

## 2023-06-23 RX ORDER — NICOTINE POLACRILEX 2 MG
4 GUM BUCCAL EVERY 6 HOURS
Refills: 0 | Status: DISCONTINUED | OUTPATIENT
Start: 2023-06-23 | End: 2023-07-03

## 2023-06-23 RX ORDER — DIPHENHYDRAMINE HCL 50 MG
50 CAPSULE ORAL EVERY 6 HOURS
Refills: 0 | Status: DISCONTINUED | OUTPATIENT
Start: 2023-06-23 | End: 2023-07-03

## 2023-06-23 RX ORDER — HYDROXYZINE HCL 10 MG
25 TABLET ORAL ONCE
Refills: 0 | Status: COMPLETED | OUTPATIENT
Start: 2023-06-23 | End: 2023-06-23

## 2023-06-23 RX ORDER — TETANUS TOXOID, REDUCED DIPHTHERIA TOXOID AND ACELLULAR PERTUSSIS VACCINE, ADSORBED 5; 2.5; 8; 8; 2.5 [IU]/.5ML; [IU]/.5ML; UG/.5ML; UG/.5ML; UG/.5ML
0.5 SUSPENSION INTRAMUSCULAR ONCE
Refills: 0 | Status: COMPLETED | OUTPATIENT
Start: 2023-06-23 | End: 2023-06-23

## 2023-06-23 RX ORDER — HALOPERIDOL DECANOATE 100 MG/ML
5 INJECTION INTRAMUSCULAR EVERY 8 HOURS
Refills: 0 | Status: DISCONTINUED | OUTPATIENT
Start: 2023-06-23 | End: 2023-07-03

## 2023-06-23 RX ADMIN — TETANUS TOXOID, REDUCED DIPHTHERIA TOXOID AND ACELLULAR PERTUSSIS VACCINE, ADSORBED 0.5 MILLILITER(S): 5; 2.5; 8; 8; 2.5 SUSPENSION INTRAMUSCULAR at 03:17

## 2023-06-23 RX ADMIN — Medication 50 MILLIGRAM(S): at 20:27

## 2023-06-23 RX ADMIN — Medication 25 MILLIGRAM(S): at 03:16

## 2023-06-23 NOTE — ED BEHAVIORAL HEALTH ASSESSMENT NOTE - SUMMARY
Mariela Parker is a 27 year old engaged female employed at [employer], currently pursuing a Associates, possibly Bachelor's degree in Accounting from the [employer], currently on temporary medical leave due to migraines, domiciled in private apartment with estelle with estelle's mother and 14 year old brother, with PMH of chronic migraines on Propanol 40 mg BID, and PPH of MDD, LAUREN, PTSD, no prior IPP admissions, currently following with psychiatrist Dr. Lisa Rodriguez through [facility] at [phone], and weekly therapy with Barbara Jimenez through her school (contact number [phone]) currently on Trazodone 50 mg qhs (patient not taking currently), Venlafaxine 150 mg (started 3 weeks at 37.5 mg and uptitrated), and Hydroxyzine PRN for anxiety (last taken a week ago) presented to ED with estelle after patient had been cutting her left wrist for the last week.    Patient presents with estelle after noted new cutting behavior with a straight edge razor starting 5 days prior as patient reports that it is the only affective coping strategy to alleviate the constant anxious feeling in her chest. Patient reported consistent feelings of low mood and anxiety for most of her life, but did endorse that over the last couple months and especially the last two weeks, patient had no interest in anything and even talking to her loved ones (which patient only considers her fiancee and one friend who lives in [place]) is of limited help and she sees it more as an obligation than a true help. Patient presents with anhedonia, poor sleep, energy, concentration, and current passive suicidal ideation, indicating that patient is currently in a major depressive episode with worsening symptoms over the past several weeks with recent escalation to violent action. Patient is unable to acknowledge what is important in life and coping skills to make her feel better besides cutting. Patient reports "there is something wrong with me" and is willing to be voluntarily admitted. Patient is at high acute risk of suicide and self harm and would benefit from inpatient psychiatry admission for safety, medication management, and symptom remission.    #MDD  #LAUREN  #PTSD  -Admit to inpatient psychiatry on 9.13 legal status (voluntary) once medically cleared  - Continue patient's home medications:  Venlafaxine  mg once daily  Trazodone PO 50 mg once daily at bedtime  - Recommend Hydroxyzine PO 50 mg Q6H PRN for breakthrough anxiety/insomnia    #Severe agitation/aggression  -For severe agitation not responding to behavioral intervention, may give haldol 5 mg po q6h prn, ativan 2 mg po q6h prn, Benadryl 50 mg po q6h prn, with escalation to IM if patient refusing PO and remains an imminent danger to self or others. If IM antipsychotic is administered, please perform follow-up ECG for QTc monitoring (<500).    #Chronic migraines  - Continue home medications of Propanolol PO 40 mg BID  - Consider starting Zofran PRN for nausea    #Daily nicotine use  - Patient reports vaping nicotine daily  - Consider PRN Nicotine replacement therapy

## 2023-06-23 NOTE — BH PATIENT PROFILE - NSINDCRISISSIGNS_PSY_ALL_CORE
Other Glaring/Self-injury/Tearfulness/Refusing to talk to staff/Other Becoming argumentative/Glaring/Self-injury/Tearfulness/Refusing to talk to staff/Other

## 2023-06-23 NOTE — ED BEHAVIORAL HEALTH ASSESSMENT NOTE - NSBHATTESTCOMMENTATTENDFT_PSY_A_CORE
Mariela Parker is a 27 year old engaged female employed at [employer], currently pursuing a Associates, possibly Bachelor's degree in Accounting from the "[employer] [facility], currently on temporary medical leave due to migraines, domiciled in private apartment with estelle with estelle's mother and 14 year old brother, with PMH of chronic migraines on Propanol 40 mg BID, and PPH of MDD, LAUREN, PTSD, no prior IPP admissions, currently following with psychiatrist Dr. Lisa Rodriguez through [facility] at [phone], and weekly therapy with Barbara Jimenez through her school (contact number [phone]) currently on Trazodone 50 mg qhs (patient not taking currently), Venlafaxine 150 mg (started 3 weeks at 37.5 mg and uptitrated), and Hydroxyzine PRN for anxiety (last taken a week ago) presented to ED with estelle after patient had been cutting her left wrist for the last week.  Pt endorses worsening depression/anxiety over the last several weeks, anhedonia, poor sleep, energy, concentration, worsening ADLs/PO intake, and endorses passive SI at this time. Endorses hopelessness. Amenable to voluntary admission.

## 2023-06-23 NOTE — ED BEHAVIORAL HEALTH NOTE - BEHAVIORAL HEALTH NOTE
Brief assessment note:    ---------------------------------------------------------    Patient is a 26 yo female, in college, currently residing with her fiancé in [place]. Pt presents Aox4, linear thought process, slightly guarded with sporadic eye contact. Pt is presenting due to increase in depressive symptoms and self-harm. Pt reports her fiancé encouraged her to go to the ED due to concerns for recent self- harm with a razor. Pt denies any real trigger to increase in symptoms. Pt report she is prescribed medications, however feel those medications are not alleviating her symptoms at this time. Pt reports pt is connected to a therapist and psychiatrist, however could not recall at the time of the brief assessment and stated she has their contact information in her phone with security. Pt reports dx of generalized anxiety, MDD and PTDS. Pt provided verbal consent to speak to her fiancé Abhijit Kasper ([phone]). Pt denies active SI/HI in the ED.

## 2023-06-23 NOTE — ED BEHAVIORAL HEALTH ASSESSMENT NOTE - DESCRIPTION
Patient had presented to the ED with her filavonnee. Patient received Adacel vaccine, given her last vaccine dose. Patient also received Atarax PO 25 mg at 0316. Patient was then evaluated by telepsychiatry Chronic migraines 27 year old engaged female employed at [employer], currently pursuing a Associates, possibly Bachelor's degree in Accounting from the [facility], currently on temporary medical leave due to migraines, domiciled in private apartment with estelle with estelle's mother and 14 year old brother. Born in [facility], moved with Mother and brother to [place], then stayed in [place] for one, then moved to [place] two years ago. Patient is not in contact with brother in  or sister still in [place] [facility] [place]. Patient is contact with her mother still.

## 2023-06-23 NOTE — ED BEHAVIORAL HEALTH ASSESSMENT NOTE - NSBHSAALC_PSY_A_CORE FT
Patient reported drinking one glass of wine yesterday, last time was months ago. Patient tends to avoid as they worsen her migraines. History of at least one episode in her early 20's when she had little sleep for 2 weeks with associated fair mood and drinking alcohol daily. Patient believes there may have been other episodes but could not recall.

## 2023-06-23 NOTE — ED PROVIDER NOTE - CLINICAL SUMMARY MEDICAL DECISION MAKING FREE TEXT BOX
Mariela Parker is a 27 year old engaged female employed at [employer], currently pursuing a Associates, possibly Bachelor's degree in Accounting from the [employer] [facility], currently on temporary medical leave due to migraines, domiciled in private apartment with estelle with estelle's mother and 14 year old brother, with PMH of chronic migraines presented to ED with estelle after patient had been cutting her left wrist for the last week.  Patient reported consistent feelings of low mood and anxiety for most of her life, but did endorse that over the last couple months and especially the last two weeks, patient had no interest in anything.  Patient presents with anhedonia, poor sleep, energy, concentration, and current passive suicidal ideation, indicating that patient is currently in a major depressive episode with worsening symptoms over the past several weeks with recent escalation to violent action. Patient is unable to acknowledge what is important in life and coping skills to make her feel better besides cutting. Patient is at high acute risk of suicide and self harm and would benefit from inpatient psychiatry admission for safety, medication management, and symptom remission.

## 2023-06-23 NOTE — BH PATIENT PROFILE - STATED REASON FOR ADMISSION
Pt. states that she has been having suicidal ideation all her life but lately the suicidal thoughts are increasingly worst.
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
## 2023-06-23 NOTE — ED BEHAVIORAL HEALTH ASSESSMENT NOTE - PSYCHIATRIC ISSUES AND PLAN (INCLUDE STANDING AND PRN MEDICATION)
MDD, LAUREN, PTSD: continue home Venlafaxine 150 mg, trazodone po 50 mg qhs, Hydroxyzine PO PRN for anxiety

## 2023-06-23 NOTE — BH PATIENT PROFILE - NSBHSNSOFSAFETY_PSY_A_CORE
listening to music being left alone/calling significant other/family member/going to their room/listening to music/reading being left alone/calling significant other/family member/fresh air breaks/going to their room/listening to music/reading/sitting quietly with someone/taking a nap/taking some deep breaths/talking to someone/using a calming object

## 2023-06-23 NOTE — BH PATIENT PROFILE - FALL HARM RISK - UNIVERSAL INTERVENTIONS
Bed in lowest position, wheels locked, appropriate side rails in place/Call bell, personal items and telephone in reach/Instruct patient to call for assistance before getting out of bed or chair/Non-slip footwear when patient is out of bed/[place] to call system/Physically safe environment - no spills, clutter or unnecessary equipment/Purposeful Proactive Rounding/Room/bathroom lighting operational, light cord in reach

## 2023-06-23 NOTE — ED BEHAVIORAL HEALTH ASSESSMENT NOTE - NS ED BHA PLAN ADMIT TO PSYCHIATRY BH CONTACTED FT
Patient's therapist was contacted and updated. Message with callback number was left with patient's psychiatrist

## 2023-06-23 NOTE — ED BEHAVIORAL HEALTH ASSESSMENT NOTE - OTHER PAST PSYCHIATRIC HISTORY (INCLUDE DETAILS REGARDING ONSET, COURSE OF ILLNESS, INPATIENT/OUTPATIENT TREATMENT)
PPH of MDD, LAUREN, PTSD, no prior IPP admissions, currently following with psychiatrist Dr. Lisa Rodriguez through [facility] at [phone], and weekly therapy with Barbara Jimenez through her school (contact number [phone]) currently on Trazodone 50 mg qhs (patient not taking currently), Venlafaxine 150 mg (started 3 weeks at 37.5 mg and uptitrated), and Hydroxyzine PRN for anxiety (last taken a week ago)
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
## 2023-06-23 NOTE — ED BEHAVIORAL HEALTH ASSESSMENT NOTE - CURRENT MEDICATION
Currently on Trazodone 50 mg qhs (patient not taking currently), Venlafaxine 150 mg (started 3 weeks at 37.5 mg and uptitrated), and Hydroxyzine PRN for anxiety (last taken a week ago)

## 2023-06-23 NOTE — ED ADULT NURSE NOTE - OBJECTIVE STATEMENT
27y female presenting to Ed with lacerations to L wrist from yesterday, for suicidal ideation
Bulb Robyn-Pharynx Suction Only

## 2023-06-23 NOTE — ED ADULT NURSE NOTE - CHIEF COMPLAINT QUOTE
" My fiancee said I want to hurt myself." + cuts to left arm, last done yesterday and feeling depressed, on medications. 1:1 initiated in triage

## 2023-06-23 NOTE — ED PROVIDER NOTE - PHYSICAL EXAMINATION
CONSTITUTIONAL: well developed; well nourished; well appearing in no acute distress  HEAD: normocephalic; atraumatic  EYES: no conjunctival injection, no scleral icterus  ENT: no nasal discharge; airway clear.  NECK: supple; non tender. + full passive ROM in all directions  CARD: warm and well perfused, not tachycardic  RESP: breathing comfortably on RA, speaking in full sentences w/o distress  EXT: moving all extremities spontaneously, normal ROM. No clubbing, cyanosis or edema  SKIN: warm and dry, +scabbed linear incision overlying L wrist/inner forearm  NEURO: alert, oriented, CN II-XII grossly intact,motor and sensory grossly intact, speech nonslurred, no focal deficits. GCS 15  PSYCH: calm, guarded/reluctantly cooperative, poor eye contact and inappropriate smile, logical thought process, no HI
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
## 2023-06-23 NOTE — ED PROVIDER NOTE - OBJECTIVE STATEMENT
27yF migraines depression p/w self-harming behavior and family concern for her safety.  Pt admits to depression "for all my life" and says she started venlafaxine 3wk ago, with dose inc 2 weeks ago.  Denies any worsening of her depressive sx but admits to cutting her L wrist (not to kill herself but for self-harming behavior) - says cuts are shallow and do not need medical attention (she is reluctant to show me them but does so on prompting).  No HI.  Reports compliance with her med (though says she often forgets her bid propranolol for migraines as it is harder to remember to take it twice a day).  Last tdap unknown (but UTD on childhood vaccines).

## 2023-06-23 NOTE — ED PROVIDER NOTE - PROGRESS NOTE DETAILS
EK - labs reviewed, reassuring.  Telepsych consulted, advise that there will be a long wait and it is unsure whether she will be assessed before morning. THONG-- pt signed out to me by Dr. Klerman.   Call placed to telepsych, pending call back.
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
## 2023-06-23 NOTE — ED PROVIDER NOTE - INTERPRETATION
Sinus Tachycardia ,DirectAddress_Unknown,[email or web address]

## 2023-06-24 LAB
A1C WITH ESTIMATED AVERAGE GLUCOSE RESULT: 5.6 % — SIGNIFICANT CHANGE UP (ref 4–5.6)
ALBUMIN SERPL ELPH-MCNC: 4.7 G/DL — SIGNIFICANT CHANGE UP (ref 3.5–5.2)
ALP SERPL-CCNC: 98 U/L — SIGNIFICANT CHANGE UP (ref 30–115)
ALT FLD-CCNC: 26 U/L — SIGNIFICANT CHANGE UP (ref 0–41)
ANION GAP SERPL CALC-SCNC: 14 MMOL/L — SIGNIFICANT CHANGE UP (ref 7–14)
AST SERPL-CCNC: 13 U/L — SIGNIFICANT CHANGE UP (ref 0–41)
BASOPHILS # BLD AUTO: 0.05 K/UL — SIGNIFICANT CHANGE UP (ref 0–0.2)
BASOPHILS NFR BLD AUTO: 0.5 % — SIGNIFICANT CHANGE UP (ref 0–1)
BILIRUB SERPL-MCNC: 0.5 MG/DL — SIGNIFICANT CHANGE UP (ref 0.2–1.2)
BUN SERPL-MCNC: 19 MG/DL — SIGNIFICANT CHANGE UP (ref 10–20)
CALCIUM SERPL-MCNC: 9.6 MG/DL — SIGNIFICANT CHANGE UP (ref 8.4–10.5)
CHLORIDE SERPL-SCNC: 103 MMOL/L — SIGNIFICANT CHANGE UP (ref 98–110)
CHOLEST SERPL-MCNC: 196 MG/DL — SIGNIFICANT CHANGE UP
CO2 SERPL-SCNC: 24 MMOL/L — SIGNIFICANT CHANGE UP (ref 17–32)
CREAT SERPL-MCNC: 0.8 MG/DL — SIGNIFICANT CHANGE UP (ref 0.7–1.5)
EGFR: 104 ML/MIN/1.73M2 — SIGNIFICANT CHANGE UP
EOSINOPHIL # BLD AUTO: 0.23 K/UL — SIGNIFICANT CHANGE UP (ref 0–0.7)
EOSINOPHIL NFR BLD AUTO: 2.2 % — SIGNIFICANT CHANGE UP (ref 0–8)
ESTIMATED AVERAGE GLUCOSE: 114 MG/DL — SIGNIFICANT CHANGE UP (ref 68–114)
GLUCOSE SERPL-MCNC: 161 MG/DL — HIGH (ref 70–99)
HCT VFR BLD CALC: 44.5 % — SIGNIFICANT CHANGE UP (ref 37–47)
HDLC SERPL-MCNC: 34 MG/DL — LOW
HGB BLD-MCNC: 14.9 G/DL — SIGNIFICANT CHANGE UP (ref 12–16)
IMM GRANULOCYTES NFR BLD AUTO: 0.3 % — SIGNIFICANT CHANGE UP (ref 0.1–0.3)
LIPID PNL WITH DIRECT LDL SERPL: 116 MG/DL — HIGH
LYMPHOCYTES # BLD AUTO: 1.92 K/UL — SIGNIFICANT CHANGE UP (ref 1.2–3.4)
LYMPHOCYTES # BLD AUTO: 18.1 % — LOW (ref 20.5–51.1)
MAGNESIUM SERPL-MCNC: 2.2 MG/DL — SIGNIFICANT CHANGE UP (ref 1.8–2.4)
MCHC RBC-ENTMCNC: 28.8 PG — SIGNIFICANT CHANGE UP (ref 27–31)
MCHC RBC-ENTMCNC: 33.5 G/DL — SIGNIFICANT CHANGE UP (ref 32–37)
MCV RBC AUTO: 86.1 FL — SIGNIFICANT CHANGE UP (ref 81–99)
MONOCYTES # BLD AUTO: 0.49 K/UL — SIGNIFICANT CHANGE UP (ref 0.1–0.6)
MONOCYTES NFR BLD AUTO: 4.6 % — SIGNIFICANT CHANGE UP (ref 1.7–9.3)
NEUTROPHILS # BLD AUTO: 7.9 K/UL — HIGH (ref 1.4–6.5)
NEUTROPHILS NFR BLD AUTO: 74.3 % — SIGNIFICANT CHANGE UP (ref 42.2–75.2)
NON HDL CHOLESTEROL: 162 MG/DL — HIGH
NRBC # BLD: 0 /100 WBCS — SIGNIFICANT CHANGE UP (ref 0–0)
PLATELET # BLD AUTO: 252 K/UL — SIGNIFICANT CHANGE UP (ref 130–400)
PMV BLD: 12.1 FL — HIGH (ref 7.4–10.4)
POTASSIUM SERPL-MCNC: 4.2 MMOL/L — SIGNIFICANT CHANGE UP (ref 3.5–5)
POTASSIUM SERPL-SCNC: 4.2 MMOL/L — SIGNIFICANT CHANGE UP (ref 3.5–5)
PROT SERPL-MCNC: 7.4 G/DL — SIGNIFICANT CHANGE UP (ref 6–8)
RBC # BLD: 5.17 M/UL — SIGNIFICANT CHANGE UP (ref 4.2–5.4)
RBC # FLD: 12.4 % — SIGNIFICANT CHANGE UP (ref 11.5–14.5)
SODIUM SERPL-SCNC: 141 MMOL/L — SIGNIFICANT CHANGE UP (ref 135–146)
TRIGL SERPL-MCNC: 228 MG/DL — HIGH
TSH SERPL-MCNC: 2.15 UIU/ML — SIGNIFICANT CHANGE UP (ref 0.27–4.2)
WBC # BLD: 10.62 K/UL — SIGNIFICANT CHANGE UP (ref 4.8–10.8)
WBC # FLD AUTO: 10.62 K/UL — SIGNIFICANT CHANGE UP (ref 4.8–10.8)

## 2023-06-24 PROCEDURE — 99231 SBSQ HOSP IP/OBS SF/LOW 25: CPT

## 2023-06-24 RX ORDER — ACETAMINOPHEN 500 MG
650 TABLET ORAL EVERY 6 HOURS
Refills: 0 | Status: DISCONTINUED | OUTPATIENT
Start: 2023-06-24 | End: 2023-07-03

## 2023-06-24 RX ADMIN — Medication 150 MILLIGRAM(S): at 08:06

## 2023-06-24 RX ADMIN — Medication 50 MILLIGRAM(S): at 20:43

## 2023-06-24 NOTE — BH INPATIENT PSYCHIATRY ASSESSMENT NOTE - CURRENT MEDICATION
MEDICATIONS  (STANDING):  propranolol 40 milliGRAM(s) Oral two times a day  traZODone 50 milliGRAM(s) Oral at bedtime  venlafaxine  milliGRAM(s) Oral daily    MEDICATIONS  (PRN):  acetaminophen     Tablet .. 650 milliGRAM(s) Oral every 6 hours PRN Temp greater or equal to 38C (100.4F), Mild Pain (1 - 3)  diphenhydrAMINE 50 milliGRAM(s) Oral every 6 hours PRN Agitation  haloperidol     Tablet 5 milliGRAM(s) Oral every 8 hours PRN Agitation  hydrOXYzine hydrochloride 50 milliGRAM(s) Oral every 6 hours PRN Anxiety or Insomnia  LORazepam     Tablet 2 milliGRAM(s) Oral every 8 hours PRN Agitation  nicotine  Polacrilex Gum 4 milliGRAM(s) Oral every 6 hours PRN Nicotine Dependence

## 2023-06-24 NOTE — BH INPATIENT PSYCHIATRY ASSESSMENT NOTE - NSBHCHARTREVIEWVS_PSY_A_CORE FT
Vital Signs Last 24 Hrs  T(C): 35.8 (06-24-23 @ 08:21), Max: 36.9 (06-23-23 @ 12:59)  T(F): 96.4 (06-24-23 @ 08:21), Max: 98.4 (06-23-23 @ 12:59)  HR: 88 (06-24-23 @ 08:21) (82 - 90)  BP: 147/96 (06-24-23 @ 08:21) (123/80 - 147/96)  BP(mean): --  RR: 18 (06-24-23 @ 08:21) (16 - 19)  SpO2: 98% (06-23-23 @ 15:03) (98% - 98%)    Orthostatic VS  06-23-23 @ 18:14  Lying BP: --/-- HR: --  Sitting BP: --/-- HR: --  Standing BP: --/-- HR: --  Site: --  Mode: electronic   Vital Signs Last 24 Hrs  T(C): 35.8 (06-24-23 @ 08:21), Max: 36.8 (06-23-23 @ 15:03)  T(F): 96.4 (06-24-23 @ 08:21), Max: 98.3 (06-23-23 @ 15:03)  HR: 88 (06-24-23 @ 08:21) (82 - 88)  BP: 147/96 (06-24-23 @ 08:21) (130/96 - 147/96)  BP(mean): --  RR: 18 (06-24-23 @ 08:21) (16 - 19)  SpO2: 98% (06-23-23 @ 15:03) (98% - 98%)    Orthostatic VS  06-23-23 @ 18:14  Lying BP: --/-- HR: --  Sitting BP: --/-- HR: --  Standing BP: --/-- HR: --  Site: --  Mode: electronic

## 2023-06-24 NOTE — BH INPATIENT PSYCHIATRY ASSESSMENT NOTE - NSBHMETABOLIC_PSY_ALL_CORE_FT
BMI: BMI (kg/m2): 30.9 (06-23-23 @ 18:14)  HbA1c:   Glucose:   BP: 147/96 (06-24-23 @ 08:21) (123/80 - 157/113)  Lipid Panel: Date/Time: 06-24-23 @ 08:26  Cholesterol, Serum: 196  Direct LDL: --  HDL Cholesterol, Serum: 34  Total Cholesterol/HDL Ration Measurement: --  Triglycerides, Serum: 228

## 2023-06-24 NOTE — BH INPATIENT PSYCHIATRY ASSESSMENT NOTE - NSCOMMENTSUICPROTRISKFACT_PSY_ALL_CORE
Patient's risk factors of current low mood, chronic passive si, and NSSIB are largely mitigated by her denial of active SI, her future orientation, her help seeking, her strong support system, her ability to cope with stress, her Catholic and moral beliefs against suicide

## 2023-06-24 NOTE — BH INPATIENT PSYCHIATRY ASSESSMENT NOTE - NSBHCRANIAL_PSY_ALL_CORE
Recognizes 2 fingers or can read (II)/Smiles, shows teeth, opens mouth, sticks out tongue (V, VII, XI)/Normal speech (IX, X, XII)/Extraocular Eye Movement Intact  (III, IV, VI)/Hearing intact (VIII)

## 2023-06-24 NOTE — BH INPATIENT PSYCHIATRY ASSESSMENT NOTE - DESCRIPTION
27 year old engaged female employed at [employer], currently pursuing a Associates, possibly Bachelor's degree in Accounting from the [employer] [facility] [place], currently on temporary medical leave due to migraines, domiciled in private apartment with estelle with estelle's mother and 14 year old brother. Born in [facility], moved with Mother and brother to [place], then stayed in [place] for one, then moved to [place] two years ago. Patient is not in contact with brother in  or sister still in [place]. Patient is contact with her mother still.

## 2023-06-24 NOTE — BH INPATIENT PSYCHIATRY ASSESSMENT NOTE - NSBHSAALC_PSY_A_CORE FT
Patient reported drinking one glass of wine yesterday, last time was months ago. Patient tends to avoid as they worsen her migraines. History of at least one episode in her early 20's when she had little sleep for 2 weeks with associated fair mood and drinking alcohol daily.

## 2023-06-24 NOTE — BH INPATIENT PSYCHIATRY ASSESSMENT NOTE - RISK ASSESSMENT
Patient's risk factors of current low mood, chronic passive si, and NSSIB are largely mitigated by her denial of active SI, her future orientation, her help seeking, her strong support system, her ability to cope with stress, her Latter-day and moral beliefs against suicide
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
## 2023-06-24 NOTE — BH INPATIENT PSYCHIATRY ASSESSMENT NOTE - NSBHASSESSSUMMFT_PSY_ALL_CORE
Mariela Parker is a 27 year old engaged female employed at [employer], currently pursuing a Associates, possibly Bachelor's degree in Accounting from the [employer] [facility] [place], currently on temporary medical leave due to migraines, domiciled in private apartment with estelle with estelle's mother and 14 year old brother, with PMH of chronic migraines on Propanol 40 mg BID, and PPH of MDD, LAUREN, PTSD, no prior IPP admissions, currently following with psychiatrist Dr. Lisa Rodriguez through [facility] at [phone], and weekly therapy with Barbara Jimenez through her school (contact number [phone]) currently on Trazodone 50 mg qhs (patient not taking currently), Venlafaxine 150 mg (started 3 weeks at 37.5 mg and uptitrated), and Hydroxyzine PRN for anxiety (last taken a week ago) presented to ED with estelle after patient had been cutting her left wrist for the last week.    Evaluation in the ED revealed new cutting behavior starting 5 days prior as patient reports that it is the only affective coping strategy to alleviate the constant anxious feeling in her chest. Patient reported consistent feelings of low mood and anxiety for most of her life, but did endorse that over the last couple months and especially the last two weeks, patient had no interest in anything and even talking to her loved ones (which patient only considers her fiancee and one friend who lives in [place]) is of limited help and she sees it more as an obligation than a true help. Patient presented with anhedonia, poor sleep, energy, concentration, and current passive suicidal ideation.     On encounter today, patient reported much of the same, reporting a history of trauma, history of depression and anxiety which worsened recently and culminated in panic attacks which were relieved by self cutting. While the patient reports has numerous protective factors, her current engagement in impulsive behaviors put her at elevated risk of self harm and warrant inpatient psychiatric hospitalization for safety and stabilization.     #MDD  #LAUREN  #PTSD  - Continue patient's home medications:  Venlafaxine  mg once daily  Trazodone PO 50 mg once daily at bedtime  - Recommend Hydroxyzine PO 50 mg Q6H PRN for breakthrough anxiety/insomnia    #Severe agitation/aggression  -For severe agitation not responding to behavioral intervention, may give haldol 5 mg po q6h prn, ativan 2 mg po q6h prn, Benadryl 50 mg po q6h prn, with escalation to IM if patient refusing PO and remains an imminent danger to self or others. If IM antipsychotic is administered, please perform follow-up ECG for QTc monitoring (<500).    #Chronic migraines  - Continue home medications of Propanolol PO 40 mg BID  - Consider starting Zofran PRN for nausea    #Daily nicotine use  - Patient reports vaping nicotine daily  - Consider PRN Nicotine replacement therapy
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
## 2023-06-24 NOTE — BH INPATIENT PSYCHIATRY ASSESSMENT NOTE - NSSUICPROTFACT_PSY_ALL_CORE
Responsibility to children, family, or others/Identifies reasons for living/Supportive social network of family or friends/Cultural, spiritual and/or moral attitudes against suicide/Positive therapeutic relationships/Ability to cope with stress/Anabaptist beliefs

## 2023-06-24 NOTE — BH INPATIENT PSYCHIATRY ASSESSMENT NOTE - OTHER PAST PSYCHIATRIC HISTORY (INCLUDE DETAILS REGARDING ONSET, COURSE OF ILLNESS, INPATIENT/OUTPATIENT TREATMENT)
PPH of MDD, LAUREN, PTSD, no prior IPP admissions, currently following with psychiatrist Dr. Lisa Rodriguez through [facility] at [phone], and weekly therapy with Barbara Jimenez through her school (contact number [phone]) currently on Trazodone 50 mg qhs (patient not taking currently), Venlafaxine 150 mg (started 3 weeks at 37.5 mg and uptitrated), and Hydroxyzine PRN for anxiety (last taken a week ago)

## 2023-06-25 PROCEDURE — 99231 SBSQ HOSP IP/OBS SF/LOW 25: CPT

## 2023-06-25 RX ADMIN — Medication 150 MILLIGRAM(S): at 08:16

## 2023-06-25 RX ADMIN — Medication 50 MILLIGRAM(S): at 09:05

## 2023-06-25 RX ADMIN — Medication 50 MILLIGRAM(S): at 20:33

## 2023-06-25 NOTE — CONSULT NOTE ADULT - SUBJECTIVE AND OBJECTIVE BOX
SUBJECTIVE:    Patient is a 27y old Female who presents with a chief complaint of   Currently admitted to medicine with the primary diagnosis of Major depressive disorder       Today is hospital day 2d. This morning she is resting comfortably in bed and reports no new issues or overnight events.     ROS:   CONSTITUTIONAL: No weakness, fevers or chills   EYES/ENT: No visual changes; No vertigo or throat pain   NECK: No pain or stiffness   RESPIRATORY: No cough, wheezing, hemoptysis; No shortness of breath   CARDIOVASCULAR: No chest pain or palpitations   GASTROINTESTINAL: No abdominal or epigastric pain. No nausea, vomiting, or hematemesis; No diarrhea or constipation. No melena or hematochezia.  GENITOURINARY: No dysuria, frequency or hematuria  NEUROLOGICAL: No numbness or weakness  SKIN: No itching, rashes      PAST MEDICAL & SURGICAL HISTORY  Depression    Migraines      SOCIAL HISTORY:    ALLERGIES:  No Known Allergies    MEDICATIONS:  STANDING MEDICATIONS  propranolol 40 milliGRAM(s) Oral two times a day  traZODone 50 milliGRAM(s) Oral at bedtime  venlafaxine  milliGRAM(s) Oral daily    PRN MEDICATIONS  acetaminophen     Tablet .. 650 milliGRAM(s) Oral every 6 hours PRN  diphenhydrAMINE 50 milliGRAM(s) Oral every 6 hours PRN  haloperidol     Tablet 5 milliGRAM(s) Oral every 8 hours PRN  hydrOXYzine hydrochloride 50 milliGRAM(s) Oral every 6 hours PRN  LORazepam     Tablet 2 milliGRAM(s) Oral every 8 hours PRN  nicotine  Polacrilex Gum 4 milliGRAM(s) Oral every 6 hours PRN    VITALS:   T(F): 98.2  HR: 74  BP: 124/79  RR: 18  SpO2: --    LABS:  Negative for smoking/alcohol/drug use.                         14.9   10.62 )-----------( 252      ( 24 Jun 2023 08:26 )             44.5     06-24    141  |  103  |  19  ----------------------------<  161<H>  4.2   |  24  |  0.8    Ca    9.6      24 Jun 2023 08:26  Mg     2.2     06-24    TPro  7.4  /  Alb  4.7  /  TBili  0.5  /  DBili  x   /  AST  13  /  ALT  26  /  AlkPhos  98  06-24      Urinalysis Basic - ( 24 Jun 2023 08:26 )    Color: x / Appearance: x / SG: x / pH: x  Gluc: 161 mg/dL / Ketone: x  / Bili: x / Urobili: x   Blood: x / Protein: x / Nitrite: x   Leuk Esterase: x / RBC: x / WBC x   Sq Epi: x / Non Sq Epi: x / Bacteria: x    RADIOLOGY:    PHYSICAL EXAM:  GEN: No acute distress  HEENT: normocephalic, atraumatic, aniceteric  LUNGS: Clear to auscultation bilaterally, no rales/wheezing/ rhonchi  HEART: S1/S2 present. RRR, no murmurs  ABD: Soft, non-tender, non-distended. Bowel sounds present  EXT: no edema  NEURO: AAOX3, normal affect      ASSESSMENT AND PLAN:    23 yo F with pmh of migraine headaches presents to the hospital with self harming behaviors, internal medicine eval called for med eval    # Self harmful behaviors  - denies si/hi on my interview  - management per primary team     # H/O Migraine headaches  - patient takes sumatriptan at home to abort headaches  - please confirm patient's medication record with pharmacy   - Can do trial of Reglan 10 mg IV x1   - fu outpatient neurology     #Gen medicine  - check TSH, HbA1C , lipid profile     thank you for the courtesy of this consult  recall prn

## 2023-06-25 NOTE — BH INPATIENT PSYCHIATRY PROGRESS NOTE - NSBHCHARTREVIEWVS_PSY_A_CORE FT
Vital Signs Last 24 Hrs  T(C): 35.8 (06-25-23 @ 15:33), Max: 36.8 (06-25-23 @ 08:58)  T(F): 96.5 (06-25-23 @ 15:33), Max: 98.2 (06-25-23 @ 08:58)  HR: 55 (06-25-23 @ 15:33) (55 - 75)  BP: 118/77 (06-25-23 @ 15:33) (118/77 - 125/77)  BP(mean): --  RR: 18 (06-25-23 @ 15:33) (16 - 18)  SpO2: --    Orthostatic VS  06-23-23 @ 18:14  Lying BP: --/-- HR: --  Sitting BP: --/-- HR: --  Standing BP: --/-- HR: --  Site: --  Mode: electronic

## 2023-06-25 NOTE — BH INPATIENT PSYCHIATRY PROGRESS NOTE - NSBHCHARTREVIEWLAB_PSY_A_CORE FT
14.9   10.62 )-----------( 252      ( 24 Jun 2023 08:26 )             44.5       06-24    141  |  103  |  19  ----------------------------<  161<H>  4.2   |  24  |  0.8    Ca    9.6      24 Jun 2023 08:26  Mg     2.2     06-24    TPro  7.4  /  Alb  4.7  /  TBili  0.5  /  DBili  x   /  AST  13  /  ALT  26  /  AlkPhos  98  06-24

## 2023-06-25 NOTE — BH INPATIENT PSYCHIATRY PROGRESS NOTE - NSBHASSESSSUMMFT_PSY_ALL_CORE
Ms Parker is a 27 year old woman with a history of major depression , LAUREN , PTSD and Migranes who was admitted to the inpatient psychiatry floor for worsening depression and suicidal ideations.   Patient appears less depressed today , conytinues to have episodica susidal ideatiuons but no intent or plan. She had migranes today .     At this time, patient is considered a danger to himself and others and needs inpatient psychiatric hospitalization for medication management and symptoms stabilization.      Diagnosis   Major depression ,   LAUREN ,   PTSD   Migranes     Plan   Continue Effexor 150mg P.o daily   Trazodone 50mg p.o bedtime     Migranes   Sumatriptan 100mg P.O Q 2 hours, PRN , maximum daily dose of 200mg   Propanolol 40mg P.o BID

## 2023-06-25 NOTE — BH INPATIENT PSYCHIATRY PROGRESS NOTE - NSBHMETABOLIC_PSY_ALL_CORE_FT
BMI: BMI (kg/m2): 30.9 (06-23-23 @ 18:14)  HbA1c: A1C with Estimated Average Glucose Result: 5.6 % (06-24-23 @ 08:26)    Glucose:   BP: 118/77 (06-25-23 @ 15:33) (118/77 - 157/113)  Lipid Panel: Date/Time: 06-24-23 @ 08:26  Cholesterol, Serum: 196  Direct LDL: --  HDL Cholesterol, Serum: 34  Total Cholesterol/HDL Ration Measurement: --  Triglycerides, Serum: 228

## 2023-06-25 NOTE — BH INPATIENT PSYCHIATRY PROGRESS NOTE - CURRENT MEDICATION
MEDICATIONS  (STANDING):  propranolol 40 milliGRAM(s) Oral two times a day  traZODone 50 milliGRAM(s) Oral at bedtime  venlafaxine  milliGRAM(s) Oral daily    MEDICATIONS  (PRN):  acetaminophen     Tablet .. 650 milliGRAM(s) Oral every 6 hours PRN Temp greater or equal to 38C (100.4F), Mild Pain (1 - 3)  diphenhydrAMINE 50 milliGRAM(s) Oral every 6 hours PRN Agitation  haloperidol     Tablet 5 milliGRAM(s) Oral every 8 hours PRN Agitation  hydrOXYzine hydrochloride 50 milliGRAM(s) Oral every 6 hours PRN Anxiety or Insomnia  LORazepam     Tablet 2 milliGRAM(s) Oral every 8 hours PRN Agitation  nicotine  Polacrilex Gum 4 milliGRAM(s) Oral every 6 hours PRN Nicotine Dependence  SUMATRIPTAN 100 milliGRAM(s) 1 Tablet(s) Oral once PRN FOR MIGRAINE RELIEF

## 2023-06-26 PROCEDURE — 99231 SBSQ HOSP IP/OBS SF/LOW 25: CPT

## 2023-06-26 RX ORDER — VENLAFAXINE HCL 75 MG
75 CAPSULE, EXT RELEASE 24 HR ORAL ONCE
Refills: 0 | Status: COMPLETED | OUTPATIENT
Start: 2023-06-28 | End: 2023-06-28

## 2023-06-26 RX ORDER — VENLAFAXINE HCL 75 MG
112.5 CAPSULE, EXT RELEASE 24 HR ORAL ONCE
Refills: 0 | Status: COMPLETED | OUTPATIENT
Start: 2023-06-27 | End: 2023-06-27

## 2023-06-26 RX ORDER — BUPROPION HYDROCHLORIDE 150 MG/1
150 TABLET, EXTENDED RELEASE ORAL DAILY
Refills: 0 | Status: DISCONTINUED | OUTPATIENT
Start: 2023-06-27 | End: 2023-06-30

## 2023-06-26 RX ORDER — VENLAFAXINE HCL 75 MG
37.5 CAPSULE, EXT RELEASE 24 HR ORAL ONCE
Refills: 0 | Status: COMPLETED | OUTPATIENT
Start: 2023-06-29 | End: 2023-06-30

## 2023-06-26 RX ADMIN — Medication 50 MILLIGRAM(S): at 20:46

## 2023-06-26 RX ADMIN — Medication 150 MILLIGRAM(S): at 08:14

## 2023-06-26 RX ADMIN — Medication 50 MILLIGRAM(S): at 12:12

## 2023-06-26 NOTE — BH TREATMENT PLAN - NSTXCOPEINTERSW_PSY_ALL_CORE
SW will meet with patient to provide support, education, collateral and referrals. Patient will be encouraged to attend groups to gain coping skills and support.

## 2023-06-26 NOTE — BH INPATIENT PSYCHIATRY PROGRESS NOTE - NSBHMETABOLIC_PSY_ALL_CORE_FT
BMI: BMI (kg/m2): 30.9 (06-23-23 @ 18:14)  HbA1c: A1C with Estimated Average Glucose Result: 5.6 % (06-24-23 @ 08:26)    Glucose:   BP: 117/80 (06-26-23 @ 08:37) (117/80 - 147/96)  Lipid Panel: Date/Time: 06-24-23 @ 08:26  Cholesterol, Serum: 196  Direct LDL: --  HDL Cholesterol, Serum: 34  Total Cholesterol/HDL Ration Measurement: --  Triglycerides, Serum: 228   BMI: BMI (kg/m2): 30.9 (06-23-23 @ 18:14)  HbA1c: A1C with Estimated Average Glucose Result: 5.6 % (06-24-23 @ 08:26)    Glucose:   BP: 118/76 (06-26-23 @ 15:42) (117/80 - 147/96)  Lipid Panel: Date/Time: 06-24-23 @ 08:26  Cholesterol, Serum: 196  Direct LDL: --  HDL Cholesterol, Serum: 34  Total Cholesterol/HDL Ration Measurement: --  Triglycerides, Serum: 228

## 2023-06-26 NOTE — UM REPORT PROGRESS NOTE - NSUMRMPROVIDER_GEN_A_CORE FT
23 Shala Gottlieb   Patient: Mariela Parker  : 1995  INSURANCE: [facility]- OUT OF STATE   ID# [id number]  [employer]          Clinicals have been uploaded to [email or web address]. Pending determination, Auth # [id number]. Will update accordingly.    23 - Chery   Patient: Mariela Parker  : 1995  INSURANCE: [facility]- OUT OF STATE   ID# [id number]  [employer]          Clinicals have been uploaded to [email or web address]. Pending determination, Auth # [id number]. Will update accordingly.   23 Chery  Patient denied - does not have behavioral health benefits.

## 2023-06-26 NOTE — BH SOCIAL WORK INITIAL PSYCHOSOCIAL EVALUATION - OTHER PAST PSYCHIATRIC HISTORY (INCLUDE DETAILS REGARDING ONSET, COURSE OF ILLNESS, INPATIENT/OUTPATIENT TREATMENT)
PPH of MDD, LAUREN, PTSD, no prior IPP admissions, currently following with psychiatrist Dr. Lisa Rodriguez through [facility] service at [phone], and weekly therapy with Barbara Jimenez through her school (contact number [phone])

## 2023-06-26 NOTE — BH TREATMENT PLAN - NSTXPLANTHERAPYSESSIONSFT_PSY_ALL_CORE
06-26-23  Type of therapy: Coping skills, Creative arts therapy, Inspiration and motiviation, Leisure development, Self esteem, Stress management  Type of session: Group  Level of patient participation: Engaged, Participates  Duration of participation: 15 minutes  Therapy conducted by: Psych rehab  Therapy Summary: Pt has been calm , cooperative , has agreed to participate in the therapeutic process.    06-26-23  Type of therapy: Dialectical behavior therapy, Coping skills  Type of session: Group  Level of patient participation: Participates  Duration of participation: 45 minutes  Therapy conducted by: Social work  Therapy Summary: Patient attended the Crisis Skills Group with  and peers. The STOP Skill was reviewed which is a distress tolerance DBT Skill that helps one respond to stressors by staying in control of emotions and not acting solely on impulse. The following steps were reviewed: “Stop, Take a step back, Observe, Proceed effectively”. Patients offered support and feedback while  facilitated the group.    Mariela participated and appeared open to the therapeutic intervention. She left the group early and is encouraged to attend future sessions.    06-26-23  Type of therapy: Coping skills, Mindfulness  Type of session: Group  Level of patient participation: Attentive, Participates  Duration of participation: 45 minutes  Therapy conducted by: Social work  Therapy Summary: Patient attended the Patient Education Group with  and peers.  reviewed physical, mental and soothing grounding techniques to help quiet distressing thoughts. The 5-4-3-2-1 method was also taught.  facilitated the discussion while patients provided feedback and support.     Mariela participated and appeared open to reviewing the materials presented. She engaged in the group mindfulness activity. She remained in good behavioral control.

## 2023-06-26 NOTE — BH INPATIENT PSYCHIATRY PROGRESS NOTE - NSBHCHARTREVIEWVS_PSY_A_CORE FT
Vital Signs Last 24 Hrs  T(C): 36.1 (06-26-23 @ 08:37), Max: 36.1 (06-26-23 @ 08:37)  T(F): 96.9 (06-26-23 @ 08:37), Max: 96.9 (06-26-23 @ 08:37)  HR: 88 (06-26-23 @ 08:37) (55 - 88)  BP: 117/80 (06-26-23 @ 08:37) (117/80 - 135/83)  BP(mean): --  RR: 16 (06-26-23 @ 08:37) (16 - 18)  SpO2: --     Vital Signs Last 24 Hrs  T(C): 36.1 (06-26-23 @ 15:42), Max: 36.1 (06-26-23 @ 08:37)  T(F): 96.9 (06-26-23 @ 15:42), Max: 96.9 (06-26-23 @ 08:37)  HR: 73 (06-26-23 @ 15:42) (73 - 88)  BP: 118/76 (06-26-23 @ 15:42) (117/80 - 135/83)  BP(mean): --  RR: 18 (06-26-23 @ 15:42) (16 - 18)  SpO2: --

## 2023-06-26 NOTE — BH INPATIENT PSYCHIATRY PROGRESS NOTE - NSBHASSESSSUMMFT_PSY_ALL_CORE
Mariela Parker is a 27 year old engaged female employed at [employer], currently pursuing a Associates, possibly Bachelor's degree in Accounting from the [employer], currently on temporary medical leave due to migraines, domiciled in private apartment with estelle with estelle's mother and 14 year old brother, with PMH of chronic migraines on Propanol 40 mg BID, and PPH of MDD, LAUREN, PTSD, no prior IPP admissions, currently following with psychiatrist Dr. Lisa Rodriguez through [facility] at [phone], and weekly therapy with Barbara Jimenez through her school (contact number [phone]) currently on Trazodone 50 mg qhs (patient not taking currently), Venlafaxine 150 mg (started 3 weeks at 37.5 mg and uptitrated), and Hydroxyzine PRN for anxiety (last taken a week ago) presented to ED with estelle after patient had been cutting her left wrist for the last week.    Evaluation in the ED revealed new cutting behavior starting 5 days prior as patient reports that it is the only affective coping strategy to alleviate the constant anxious feeling in her chest. Patient reported consistent feelings of low mood and anxiety for most of her life, but did endorse that over the last couple months and especially the last two weeks, patient had no interest in anything and even talking to her loved ones (which patient only considers her fiancee and one friend who lives in [place]) is of limited help and she sees it more as an obligation than a true help. Patient presented with anhedonia, poor sleep, energy, concentration, and current passive suicidal ideation.     On encounter today, patient reported much of the same, reporting a history of trauma, history of depression and anxiety which worsened recently and culminated in panic attacks which were relieved by self cutting. While the patient reports has numerous protective factors, her current engagement in impulsive behaviors put her at elevated risk of self harm and warrant inpatient psychiatric hospitalization for safety and stabilization.     #MDD  #LAUREN  #PTSD  - Continue patient's home medications:  Venlafaxine  mg once daily  Trazodone PO 50 mg once daily at bedtime  - Recommend Hydroxyzine PO 50 mg Q6H PRN for breakthrough anxiety/insomnia    #Severe agitation/aggression  -For severe agitation not responding to behavioral intervention, may give haldol 5 mg po q6h prn, ativan 2 mg po q6h prn, Benadryl 50 mg po q6h prn, with escalation to IM if patient refusing PO and remains an imminent danger to self or others. If IM antipsychotic is administered, please perform follow-up ECG for QTc monitoring (<500).    #Chronic migraines  - Continue home medications of Propanolol PO 40 mg BID  - Consider starting Zofran PRN for nausea    #Daily nicotine use  - Patient reports vaping nicotine daily  - Consider PRN Nicotine replacement therapy   Mariela Parker is a 27 year old engaged female employed at [employer], currently pursuing a Associates, possibly Bachelor's degree in Accounting from the [employer], currently on temporary medical leave due to migraines, domiciled in private apartment with estelle with estelle's mother and 14 year old brother, with PMH of chronic migraines on Propanol 40 mg BID, and PPH of MDD, LAUREN, PTSD, no prior IPP admissions, currently following with psychiatrist Dr. Lisa Rodriguez through [facility] at [phone], and weekly therapy with Barbara Jimenez through her school (contact number [phone]) currently on Trazodone 50 mg qhs (patient not taking currently), Venlafaxine 150 mg (started 3 weeks at 37.5 mg and uptitrated), and Hydroxyzine PRN for anxiety (last taken a week ago) presented to ED with estelle after patient had been cutting her left wrist for the last week.    Evaluation in the ED revealed new cutting behavior starting 5 days prior as patient reports that it is the only affective coping strategy to alleviate the constant anxious feeling in her chest. Patient reported consistent feelings of low mood and anxiety for most of her life, but did endorse that over the last couple months and especially the last two weeks, patient had no interest in anything and even talking to her loved ones (which patient only considers her fiancee and one friend who lives in [place]) is of limited help and she sees it more as an obligation than a true help. Patient presented with anhedonia, poor sleep, energy, concentration, and current passive suicidal ideation.     On initial assessment on IPP, patient reported much of the same, reporting a history of trauma, history of depression and anxiety which worsened recently and culminated in panic attacks which were relieved by self cutting.     On continued assessment patient endorsed and began to display several . While the patient reports has numerous protective factors, her current engagement in impulsive behaviors put her at elevated risk of self harm and warrant inpatient psychiatric hospitalization for safety and stabilization.     #MDD  #LAUREN  #PTSD  - Continue patient's home medications:  Venlafaxine  mg once daily  Trazodone PO 50 mg once daily at bedtime  - Recommend Hydroxyzine PO 50 mg Q6H PRN for breakthrough anxiety/insomnia    #Severe agitation/aggression  -For severe agitation not responding to behavioral intervention, may give haldol 5 mg po q6h prn, ativan 2 mg po q6h prn, Benadryl 50 mg po q6h prn, with escalation to IM if patient refusing PO and remains an imminent danger to self or others. If IM antipsychotic is administered, please perform follow-up ECG for QTc monitoring (<500).    #Chronic migraines  - Continue home medications of Propanolol PO 40 mg BID  - Consider starting Zofran PRN for nausea    #Daily nicotine use  - Patient reports vaping nicotine daily  - Consider PRN Nicotine replacement therapy   Mariela Parker is a 27 year old engaged female employed at [employer], currently pursuing a Associates, possibly Bachelor's degree in Accounting from the [employer], currently on temporary medical leave due to migraines, domiciled in private apartment with estelle with estelle's mother and 14 year old brother, with PMH of chronic migraines on Propanol 40 mg BID, and PPH of MDD, LAUREN, PTSD, no prior IPP admissions, currently following with psychiatrist Dr. Lisa Rodriguez through [facility] at [phone], and weekly therapy with Barbara Jimenez through her school (contact number [phone]) currently on Trazodone 50 mg qhs (patient not taking currently), Venlafaxine 150 mg (started 3 weeks at 37.5 mg and uptitrated), and Hydroxyzine PRN for anxiety (last taken a week ago) presented to ED with estelle after patient had been cutting her left wrist for the last week.    Evaluation in the ED revealed new cutting behavior starting 5 days prior as patient reports that it is the only affective coping strategy to alleviate the constant anxious feeling in her chest. Patient reported consistent feelings of low mood and anxiety for most of her life, but did endorse that over the last couple months and especially the last two weeks, patient had no interest in anything and even talking to her loved ones (which patient only considers her fiancee and one friend who lives in [place]) is of limited help and she sees it more as an obligation than a true help. Patient presented with anhedonia, poor sleep, energy, concentration, and current passive suicidal ideation.     On initial assessment on IPP, patient reported much of the same, reporting a history of trauma, history of depression and anxiety which worsened recently and culminated in panic attacks which were relieved by self cutting.     On continued assessment patient endorsed and began to display several personality characteristics including apathy in regards to interpersonal relatioships, anxiety about knowing plans and schedules. While the patient reports has numerous protective factors, her current engagement in impulsive behaviors put her at elevated risk of self harm and warrant inpatient psychiatric hospitalization for safety and stabilization.     #MDD  #LAUREN  #PTSD  - Taper down Venlafaxine 150mg PO once daily by 37.5mg PO daily  - Trazodone 50mg PO once daily at bedtime  - Recommend Hydroxyzine 50mg PO Q6H PRN for breakthrough anxiety/insomnia    #Severe agitation/aggression  -For severe agitation not responding to behavioral intervention, may give haldol 5 mg po q6h prn, ativan 2 mg po q6h prn, Benadryl 50 mg po q6h prn, with escalation to IM if patient refusing PO and remains an imminent danger to self or others. If IM antipsychotic is administered, please perform follow-up ECG for QTc monitoring (<500).    #Chronic migraines  - Continue home medications of Propanolol PO 40 mg BID  - Consider starting Zofran PRN for nausea    #Daily nicotine use  - Patient reports vaping nicotine daily  - Consider PRN Nicotine replacement therapy

## 2023-06-27 PROCEDURE — 99231 SBSQ HOSP IP/OBS SF/LOW 25: CPT

## 2023-06-27 RX ORDER — SALICYLIC ACID 0.5 %
1 CLEANSER (GRAM) TOPICAL
Refills: 0 | Status: DISCONTINUED | OUTPATIENT
Start: 2023-06-27 | End: 2023-07-03

## 2023-06-27 RX ORDER — VENLAFAXINE HCL 75 MG
112.5 CAPSULE, EXT RELEASE 24 HR ORAL ONCE
Refills: 0 | Status: COMPLETED | OUTPATIENT
Start: 2023-06-27 | End: 2023-06-27

## 2023-06-27 RX ORDER — ONDANSETRON 8 MG/1
4 TABLET, FILM COATED ORAL ONCE
Refills: 0 | Status: COMPLETED | OUTPATIENT
Start: 2023-06-27 | End: 2023-06-27

## 2023-06-27 RX ADMIN — ONDANSETRON 4 MILLIGRAM(S): 8 TABLET, FILM COATED ORAL at 17:00

## 2023-06-27 RX ADMIN — Medication 112.5 MILLIGRAM(S): at 10:22

## 2023-06-27 RX ADMIN — Medication 50 MILLIGRAM(S): at 20:20

## 2023-06-27 RX ADMIN — Medication 50 MILLIGRAM(S): at 13:00

## 2023-06-27 RX ADMIN — BUPROPION HYDROCHLORIDE 150 MILLIGRAM(S): 150 TABLET, EXTENDED RELEASE ORAL at 08:51

## 2023-06-27 RX ADMIN — Medication 2 MILLIGRAM(S): at 20:20

## 2023-06-27 NOTE — BH INPATIENT PSYCHIATRY PROGRESS NOTE - CURRENT MEDICATION
MEDICATIONS  (STANDING):  buPROPion XL (24-Hour) 150 milliGRAM(s) Oral daily  propranolol 40 milliGRAM(s) Oral two times a day  traZODone 50 milliGRAM(s) Oral at bedtime    MEDICATIONS  (PRN):  acetaminophen     Tablet .. 650 milliGRAM(s) Oral every 6 hours PRN Temp greater or equal to 38C (100.4F), Mild Pain (1 - 3)  diphenhydrAMINE 50 milliGRAM(s) Oral every 6 hours PRN Agitation  haloperidol     Tablet 5 milliGRAM(s) Oral every 8 hours PRN Agitation  hydrOXYzine hydrochloride 50 milliGRAM(s) Oral every 6 hours PRN Anxiety or Insomnia  LORazepam     Tablet 2 milliGRAM(s) Oral every 8 hours PRN Agitation  nicotine  Polacrilex Gum 4 milliGRAM(s) Oral every 6 hours PRN Nicotine Dependence  SUMATRIPTAN 100 milliGRAM(s) 1 Tablet(s) Oral once PRN FOR MIGRAINE RELIEF  vitamin A &amp; D Ointment 1 Application(s) Topical two times a day PRN dry skin

## 2023-06-27 NOTE — BH INPATIENT PSYCHIATRY PROGRESS NOTE - NSBHMETABOLIC_PSY_ALL_CORE_FT
BMI: BMI (kg/m2): 30.9 (06-23-23 @ 18:14)  HbA1c: A1C with Estimated Average Glucose Result: 5.6 % (06-24-23 @ 08:26)    Glucose:   BP: 124/86 (06-27-23 @ 09:08) (115/75 - 135/83)  Lipid Panel: Date/Time: 06-24-23 @ 08:26  Cholesterol, Serum: 196  Direct LDL: --  HDL Cholesterol, Serum: 34  Total Cholesterol/HDL Ration Measurement: --  Triglycerides, Serum: 228

## 2023-06-27 NOTE — BH INPATIENT PSYCHIATRY PROGRESS NOTE - NSBHCHARTREVIEWVS_PSY_A_CORE FT
Vital Signs Last 24 Hrs  T(C): 36.4 (06-27-23 @ 09:08), Max: 36.4 (06-27-23 @ 09:08)  T(F): 97.5 (06-27-23 @ 09:08), Max: 97.5 (06-27-23 @ 09:08)  HR: 65 (06-27-23 @ 09:08) (65 - 73)  BP: 124/86 (06-27-23 @ 09:08) (115/75 - 124/86)  BP(mean): --  RR: 65 (06-27-23 @ 09:08) (16 - 65)  SpO2: --

## 2023-06-27 NOTE — BH INPATIENT PSYCHIATRY PROGRESS NOTE - NSBHASSESSSUMMFT_PSY_ALL_CORE
Mariela Parker is a 27 year old engaged female employed at [employer], currently pursuing a Associates, possibly Bachelor's degree in Accounting from the [employer], currently on temporary medical leave due to migraines, domiciled in private apartment with estelle with estelle's mother and 14 year old brother, with PMH of chronic migraines on Propanol 40 mg BID, and PPH of MDD, LAUREN, PTSD, no prior IPP admissions, currently following with psychiatrist Dr. Lisa Rodriguez through [facility] at [phone], and weekly therapy with Barbara Jimenez through her school (contact number [phone]) currently on Trazodone 50 mg qhs (patient not taking currently), Venlafaxine 150 mg (started 3 weeks at 37.5 mg and uptitrated), and Hydroxyzine PRN for anxiety (last taken a week ago) presented to ED with estelle after patient had been cutting her left wrist for the last week.    Evaluation in the ED revealed new cutting behavior starting 5 days prior as patient reports that it is the only affective coping strategy to alleviate the constant anxious feeling in her chest. Patient reported consistent feelings of low mood and anxiety for most of her life, but did endorse that over the last couple months and especially the last two weeks, patient had no interest in anything and even talking to her loved ones (which patient only considers her fiancee and one friend who lives in [place]) is of limited help and she sees it more as an obligation than a true help. Patient presented with anhedonia, poor sleep, energy, concentration, and current passive suicidal ideation.     On initial assessment on IPP, patient reported much of the same, reporting a history of trauma, history of depression and anxiety which worsened recently and culminated in panic attacks which were relieved by self cutting.     On continued assessment patient endorsed and began to display several personality characteristics including apathy in regards to interpersonal relatioships, anxiety about knowing plans and schedules. Patient is also still endorsing chronic passive SI. While the patient reports has numerous protective factors, her current engagement in impulsive behaviors put her at elevated risk of self harm and warrant inpatient psychiatric hospitalization for safety and stabilization.     #MDD  #LAUREN  #PTSD  - Taper down Venlafaxine 150mg PO once daily by 37.5mg PO daily  - Start Wellbutrin 150mg XR PO daily  - Trazodone 50mg PO once daily at bedtime  - Recommend Hydroxyzine 50mg PO Q6H PRN for breakthrough anxiety/insomnia  - family meeting scheduled on 6/30/23    #Severe agitation/aggression  -For severe agitation not responding to behavioral intervention, may give haldol 5 mg po q6h prn, ativan 2 mg po q6h prn, Benadryl 50 mg po q6h prn, with escalation to IM if patient refusing PO and remains an imminent danger to self or others. If IM antipsychotic is administered, please perform follow-up ECG for QTc monitoring (<500).    #Chronic migraines  - Continue home medications of Propanolol PO 40 mg BID  - continue home medication of Sumatriptan 100mg q2 PRN daily (MDD: 200mg)  - Consider starting Zofran PRN for nausea    #Daily nicotine use  - Patient reports vaping nicotine daily  - Consider PRN Nicotine replacement therapy

## 2023-06-28 RX ADMIN — Medication 75 MILLIGRAM(S): at 09:17

## 2023-06-28 RX ADMIN — BUPROPION HYDROCHLORIDE 150 MILLIGRAM(S): 150 TABLET, EXTENDED RELEASE ORAL at 08:16

## 2023-06-28 RX ADMIN — Medication 50 MILLIGRAM(S): at 20:25

## 2023-06-28 NOTE — BH SAFETY PLAN - DISTRACTION PLACE 1
One place that is safe and will provide a distraction for me would be [facility].

## 2023-06-28 NOTE — BH INPATIENT PSYCHIATRY PROGRESS NOTE - CURRENT MEDICATION
MEDICATIONS  (STANDING):  buPROPion XL (24-Hour) 150 milliGRAM(s) Oral daily  propranolol 40 milliGRAM(s) Oral two times a day  traZODone 50 milliGRAM(s) Oral at bedtime    MEDICATIONS  (PRN):  acetaminophen     Tablet .. 650 milliGRAM(s) Oral every 6 hours PRN Temp greater or equal to 38C (100.4F), Mild Pain (1 - 3)  aluminum hydroxide/magnesium hydroxide/simethicone Suspension 30 milliLiter(s) Oral once PRN Dyspepsia  diphenhydrAMINE 50 milliGRAM(s) Oral every 6 hours PRN Agitation  haloperidol     Tablet 5 milliGRAM(s) Oral every 8 hours PRN Agitation  hydrOXYzine hydrochloride 50 milliGRAM(s) Oral every 6 hours PRN Anxiety or Insomnia  LORazepam     Tablet 2 milliGRAM(s) Oral every 8 hours PRN Agitation  nicotine  Polacrilex Gum 4 milliGRAM(s) Oral every 6 hours PRN Nicotine Dependence  SUMATRIPTAN 100 milliGRAM(s) 1 Tablet(s) Oral once PRN FOR MIGRAINE RELIEF  vitamin A &amp; D Ointment 1 Application(s) Topical two times a day PRN dry skin

## 2023-06-28 NOTE — BH SAFETY PLAN - WARNING SIGN 3
Also, another warning sign of my stresses would be pick at my fingers and or lips. Complex Repair And Rhombic Flap Text: The defect edges were debeveled with a #15 scalpel blade.  The primary defect was closed partially with a complex linear closure.  Given the location of the remaining defect, shape of the defect and the proximity to free margins a rhombic flap was deemed most appropriate for complete closure of the defect.  Using a sterile surgical marker, an appropriate advancement flap was drawn incorporating the defect and placing the expected incisions within the relaxed skin tension lines where possible.    The area thus outlined was incised deep to adipose tissue with a #15 scalpel blade.  The skin margins were undermined to an appropriate distance in all directions utilizing iris scissors.
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
## 2023-06-28 NOTE — BH INPATIENT PSYCHIATRY PROGRESS NOTE - NSBHMETABOLIC_PSY_ALL_CORE_FT
BMI: BMI (kg/m2): 30.9 (06-23-23 @ 18:14)  HbA1c: A1C with Estimated Average Glucose Result: 5.6 % (06-24-23 @ 08:26)    Glucose:   BP: 117/76 (06-28-23 @ 08:31) (115/75 - 135/83)  Lipid Panel: Date/Time: 06-24-23 @ 08:26  Cholesterol, Serum: 196  Direct LDL: --  HDL Cholesterol, Serum: 34  Total Cholesterol/HDL Ration Measurement: --  Triglycerides, Serum: 228

## 2023-06-28 NOTE — BH INPATIENT PSYCHIATRY PROGRESS NOTE - NSBHCHARTREVIEWVS_PSY_A_CORE FT
Vital Signs Last 24 Hrs  T(C): 35.9 (06-27-23 @ 16:10), Max: 35.9 (06-27-23 @ 16:10)  T(F): 96.7 (06-27-23 @ 16:10), Max: 96.7 (06-27-23 @ 16:10)  HR: 87 (06-28-23 @ 08:31) (64 - 87)  BP: 117/76 (06-28-23 @ 08:31) (117/76 - 134/82)  BP(mean): --  RR: 18 (06-28-23 @ 08:31) (16 - 18)  SpO2: --

## 2023-06-28 NOTE — BH SAFETY PLAN - ENVIRONMENT SAFETY 1:
One way I would make my environment safe would be to call for help once I get into one of these moods.

## 2023-06-28 NOTE — BH SAFETY PLAN - ENVIRONMENT SAFETY 3:
Also, another way I would make my environment safe would be to take my medications properly as prescribed by the doctors.

## 2023-06-29 PROBLEM — F32.A DEPRESSION, UNSPECIFIED: Chronic | Status: ACTIVE | Noted: 2023-06-23

## 2023-06-29 PROBLEM — G43.909 MIGRAINE, UNSPECIFIED, NOT INTRACTABLE, WITHOUT STATUS MIGRAINOSUS: Chronic | Status: ACTIVE | Noted: 2023-06-23

## 2023-06-29 RX ADMIN — BUPROPION HYDROCHLORIDE 150 MILLIGRAM(S): 150 TABLET, EXTENDED RELEASE ORAL at 09:06

## 2023-06-29 RX ADMIN — Medication 50 MILLIGRAM(S): at 20:34
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
## 2023-06-29 NOTE — BH INPATIENT PSYCHIATRY PROGRESS NOTE - NSBHMETABOLIC_PSY_ALL_CORE_FT
BMI: BMI (kg/m2): 30.9 (06-23-23 @ 18:14)  HbA1c: A1C with Estimated Average Glucose Result: 5.6 % (06-24-23 @ 08:26)    Glucose:   BP: 122/73 (06-29-23 @ 09:06) (115/75 - 135/100)  Lipid Panel: Date/Time: 06-24-23 @ 08:26  Cholesterol, Serum: 196  Direct LDL: --  HDL Cholesterol, Serum: 34  Total Cholesterol/HDL Ration Measurement: --  Triglycerides, Serum: 228

## 2023-06-29 NOTE — BH INPATIENT PSYCHIATRY PROGRESS NOTE - NSBHCHARTREVIEWVS_PSY_A_CORE FT
Vital Signs Last 24 Hrs  T(C): 35.8 (06-29-23 @ 09:06), Max: 37 (06-29-23 @ 05:37)  T(F): 96.4 (06-29-23 @ 09:06), Max: 98.6 (06-29-23 @ 05:37)  HR: 85 (06-29-23 @ 09:06) (84 - 85)  BP: 122/73 (06-29-23 @ 09:06) (122/73 - 135/100)  BP(mean): --  RR: 18 (06-29-23 @ 09:06) (16 - 18)  SpO2: --

## 2023-06-29 NOTE — BH INPATIENT PSYCHIATRY PROGRESS NOTE - NSBHMETABOLIC_PSY_ALL_CORE_FT
BMI: BMI (kg/m2): 30.9 (06-23-23 @ 18:14)  HbA1c: A1C with Estimated Average Glucose Result: 5.6 % (06-24-23 @ 08:26)    Glucose:   BP: 135/100 (06-29-23 @ 05:37) (115/75 - 135/100)  Lipid Panel: Date/Time: 06-24-23 @ 08:26  Cholesterol, Serum: 196  Direct LDL: --  HDL Cholesterol, Serum: 34  Total Cholesterol/HDL Ration Measurement: --  Triglycerides, Serum: 228

## 2023-06-29 NOTE — BH INPATIENT PSYCHIATRY PROGRESS NOTE - CURRENT MEDICATION
MEDICATIONS  (STANDING):  buPROPion XL (24-Hour) 150 milliGRAM(s) Oral daily  propranolol 40 milliGRAM(s) Oral two times a day  traZODone 50 milliGRAM(s) Oral at bedtime  venlafaxine XR 37.5 milliGRAM(s) Oral once    MEDICATIONS  (PRN):  acetaminophen     Tablet .. 650 milliGRAM(s) Oral every 6 hours PRN Temp greater or equal to 38C (100.4F), Mild Pain (1 - 3)  aluminum hydroxide/magnesium hydroxide/simethicone Suspension 30 milliLiter(s) Oral once PRN Dyspepsia  diphenhydrAMINE 50 milliGRAM(s) Oral every 6 hours PRN Agitation  haloperidol     Tablet 5 milliGRAM(s) Oral every 8 hours PRN Agitation  hydrOXYzine hydrochloride 50 milliGRAM(s) Oral every 6 hours PRN Anxiety or Insomnia  LORazepam     Tablet 2 milliGRAM(s) Oral every 8 hours PRN Agitation  nicotine  Polacrilex Gum 4 milliGRAM(s) Oral every 6 hours PRN Nicotine Dependence  vitamin A &amp; D Ointment 1 Application(s) Topical two times a day PRN dry skin
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
## 2023-06-29 NOTE — BH INPATIENT PSYCHIATRY PROGRESS NOTE - NSBHCHARTREVIEWVS_PSY_A_CORE FT
Vital Signs Last 24 Hrs  T(C): 37 (06-29-23 @ 05:37), Max: 37 (06-29-23 @ 05:37)  T(F): 98.6 (06-29-23 @ 05:37), Max: 98.6 (06-29-23 @ 05:37)  HR: 84 (06-29-23 @ 05:37) (84 - 87)  BP: 135/100 (06-29-23 @ 05:37) (117/76 - 135/100)  BP(mean): --  RR: 18 (06-29-23 @ 05:37) (16 - 18)  SpO2: --

## 2023-06-30 RX ORDER — BUPROPION HYDROCHLORIDE 150 MG/1
300 TABLET, EXTENDED RELEASE ORAL DAILY
Refills: 0 | Status: DISCONTINUED | OUTPATIENT
Start: 2023-07-01 | End: 2023-07-03

## 2023-06-30 RX ORDER — BUPROPION HYDROCHLORIDE 150 MG/1
150 TABLET, EXTENDED RELEASE ORAL DAILY
Refills: 0 | Status: DISCONTINUED | OUTPATIENT
Start: 2023-06-30 | End: 2023-07-03

## 2023-06-30 RX ADMIN — Medication 650 MILLIGRAM(S): at 06:41

## 2023-06-30 RX ADMIN — BUPROPION HYDROCHLORIDE 150 MILLIGRAM(S): 150 TABLET, EXTENDED RELEASE ORAL at 11:11

## 2023-06-30 RX ADMIN — Medication 650 MILLIGRAM(S): at 17:07

## 2023-06-30 RX ADMIN — BUPROPION HYDROCHLORIDE 150 MILLIGRAM(S): 150 TABLET, EXTENDED RELEASE ORAL at 08:19

## 2023-06-30 RX ADMIN — Medication 37.5 MILLIGRAM(S): at 08:23

## 2023-06-30 RX ADMIN — Medication 650 MILLIGRAM(S): at 16:11

## 2023-06-30 RX ADMIN — Medication 50 MILLIGRAM(S): at 20:08

## 2023-06-30 RX ADMIN — Medication 650 MILLIGRAM(S): at 06:11

## 2023-06-30 NOTE — BH DISCHARGE NOTE NURSING/SOCIAL WORK/PSYCH REHAB - NSDCVIVACCINE_GEN_ALL_CORE_FT
Tdap; 23-Jun-2023 03:17; Manju Dillon (RN); [employer]; [id number] (Exp. Date: 08-Mar-2025); IntraMuscular; Deltoid Left.; 0.5 milliLiter(s); VIS (VIS Published: 09-May-2013, VIS Presented: 23-Jun-2023);

## 2023-06-30 NOTE — BH INPATIENT PSYCHIATRY PROGRESS NOTE - NSDCCRITERIA_PSY_ALL_CORE
patient will be able to safety plan and have improvement in depressive symptoms. 

## 2023-06-30 NOTE — BH INPATIENT PSYCHIATRY PROGRESS NOTE - NSBHLEGALSTATUSDATE_PSY_ALL_CORE
26-Jun-2023 16:48

## 2023-06-30 NOTE — BH DISCHARGE NOTE NURSING/SOCIAL WORK/PSYCH REHAB - NSTOBACCONORTHWELLF/U_GEN_A_CORE_CS
[facility] ([address] [place] [address], [place] [id number], [phone])

## 2023-06-30 NOTE — BH INPATIENT PSYCHIATRY PROGRESS NOTE - CURRENT MEDICATION
MEDICATIONS  (STANDING):  buPROPion XL (24-Hour) 150 milliGRAM(s) Oral daily  propranolol 40 milliGRAM(s) Oral two times a day  traZODone 50 milliGRAM(s) Oral at bedtime  venlafaxine XR 37.5 milliGRAM(s) Oral once    MEDICATIONS  (PRN):  acetaminophen     Tablet .. 650 milliGRAM(s) Oral every 6 hours PRN Temp greater or equal to 38C (100.4F), Mild Pain (1 - 3)  aluminum hydroxide/magnesium hydroxide/simethicone Suspension 30 milliLiter(s) Oral once PRN Dyspepsia  diphenhydrAMINE 50 milliGRAM(s) Oral every 6 hours PRN Agitation  haloperidol     Tablet 5 milliGRAM(s) Oral every 8 hours PRN Agitation  hydrOXYzine hydrochloride 50 milliGRAM(s) Oral every 6 hours PRN Anxiety or Insomnia  LORazepam     Tablet 2 milliGRAM(s) Oral every 8 hours PRN Agitation  nicotine  Polacrilex Gum 4 milliGRAM(s) Oral every 6 hours PRN Nicotine Dependence  vitamin A &amp; D Ointment 1 Application(s) Topical two times a day PRN dry skin   MEDICATIONS  (STANDING):  buPROPion XL (24-Hour) 150 milliGRAM(s) Oral daily  propranolol 40 milliGRAM(s) Oral two times a day  traZODone 50 milliGRAM(s) Oral at bedtime    MEDICATIONS  (PRN):  acetaminophen     Tablet .. 650 milliGRAM(s) Oral every 6 hours PRN Temp greater or equal to 38C (100.4F), Mild Pain (1 - 3)  aluminum hydroxide/magnesium hydroxide/simethicone Suspension 30 milliLiter(s) Oral once PRN Dyspepsia  diphenhydrAMINE 50 milliGRAM(s) Oral every 6 hours PRN Agitation  haloperidol     Tablet 5 milliGRAM(s) Oral every 8 hours PRN Agitation  hydrOXYzine hydrochloride 50 milliGRAM(s) Oral every 6 hours PRN Anxiety or Insomnia  LORazepam     Tablet 2 milliGRAM(s) Oral every 8 hours PRN Agitation  nicotine  Polacrilex Gum 4 milliGRAM(s) Oral every 6 hours PRN Nicotine Dependence  vitamin A &amp; D Ointment 1 Application(s) Topical two times a day PRN dry skin

## 2023-06-30 NOTE — BH INPATIENT PSYCHIATRY PROGRESS NOTE - NSBHCHARTREVIEWVS_PSY_A_CORE FT
Vital Signs Last 24 Hrs  T(C): 35.6 (06-29-23 @ 16:08), Max: 35.8 (06-29-23 @ 09:06)  T(F): 96 (06-29-23 @ 16:08), Max: 96.4 (06-29-23 @ 09:06)  HR: 80 (06-29-23 @ 20:33) (80 - 85)  BP: 118/73 (06-29-23 @ 20:33) (116/88 - 122/73)  BP(mean): --  RR: 16 (06-29-23 @ 16:08) (16 - 18)  SpO2: --     Vital Signs Last 24 Hrs  T(C): 35.6 (06-29-23 @ 16:08), Max: 35.6 (06-29-23 @ 16:08)  T(F): 96 (06-29-23 @ 16:08), Max: 96 (06-29-23 @ 16:08)  HR: 75 (06-30-23 @ 07:38) (75 - 83)  BP: 100/56 (06-30-23 @ 07:38) (100/56 - 118/73)  BP(mean): --  RR: 18 (06-30-23 @ 07:38) (16 - 18)  SpO2: --

## 2023-06-30 NOTE — BH DISCHARGE NOTE NURSING/SOCIAL WORK/PSYCH REHAB - NSCDUDCCRISIS_PSY_A_CORE
[facility] Well  [phone]) [facility]-WELL ([phone])  Text "WELL" to [phone]  Website: [email or web address]/.[facility] 1 [phone] 621-HOPE (9809) Website: www.safehorizon.org/.National Suicide Prevention Lifeline 2 (421) 191-4550/.  [facility]  1 (113) LIFENET (506-3522)/988 Suicide and Crisis Lifeline

## 2023-06-30 NOTE — BH INPATIENT PSYCHIATRY PROGRESS NOTE - NSTXSUICIDGOAL_PSY_ALL_CORE
Talk to staff and ask for assistance when having suicidal wishes instead of acting out

## 2023-06-30 NOTE — BH INPATIENT PSYCHIATRY PROGRESS NOTE - NSBHCONSDANGERSELF_PSY_A_CORE
suicidal behavior
unable to care for self
suicidal ideation with plan and means/unable to care for self
unable to care for self
suicidal ideation with plan and means/unable to care for self

## 2023-06-30 NOTE — BH INPATIENT PSYCHIATRY PROGRESS NOTE - NSBHATTESTBILLING_PSY_A_CORE
75211-Pslykilsni OBS or IP - low complexity OR 25-34 mins
07713-Huafdfuyvu OBS or IP - low complexity OR 25-34 mins
[id number]-Pybwyycrns OBS or IP - low complexity OR 25-34 mins
48152-Howwkivkhc OBS or IP - low complexity OR 25-34 mins
[id number]-Xrubbtepqi OBS or IP - low complexity OR 25-34 mins
19249-Djvjnwzylq OBS or IP - low complexity OR 25-34 mins
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
## 2023-06-30 NOTE — BH INPATIENT PSYCHIATRY PROGRESS NOTE - NSBHMSEATTEN_PSY_A_CORE
Please have a left hand ultra sound to evaluate the median nerve.  We will follow up with results of the ultra sound.  
Normal

## 2023-06-30 NOTE — BH INPATIENT PSYCHIATRY PROGRESS NOTE - PRN MEDS
MEDICATIONS  (PRN):  acetaminophen     Tablet .. 650 milliGRAM(s) Oral every 6 hours PRN Temp greater or equal to 38C (100.4F), Mild Pain (1 - 3)  aluminum hydroxide/magnesium hydroxide/simethicone Suspension 30 milliLiter(s) Oral once PRN Dyspepsia  diphenhydrAMINE 50 milliGRAM(s) Oral every 6 hours PRN Agitation  haloperidol     Tablet 5 milliGRAM(s) Oral every 8 hours PRN Agitation  hydrOXYzine hydrochloride 50 milliGRAM(s) Oral every 6 hours PRN Anxiety or Insomnia  LORazepam     Tablet 2 milliGRAM(s) Oral every 8 hours PRN Agitation  nicotine  Polacrilex Gum 4 milliGRAM(s) Oral every 6 hours PRN Nicotine Dependence  vitamin A &amp; D Ointment 1 Application(s) Topical two times a day PRN dry skin  
MEDICATIONS  (PRN):  acetaminophen     Tablet .. 650 milliGRAM(s) Oral every 6 hours PRN Temp greater or equal to 38C (100.4F), Mild Pain (1 - 3)  diphenhydrAMINE 50 milliGRAM(s) Oral every 6 hours PRN Agitation  haloperidol     Tablet 5 milliGRAM(s) Oral every 8 hours PRN Agitation  hydrOXYzine hydrochloride 50 milliGRAM(s) Oral every 6 hours PRN Anxiety or Insomnia  LORazepam     Tablet 2 milliGRAM(s) Oral every 8 hours PRN Agitation  nicotine  Polacrilex Gum 4 milliGRAM(s) Oral every 6 hours PRN Nicotine Dependence  SUMATRIPTAN 100 milliGRAM(s) 1 Tablet(s) Oral once PRN FOR MIGRAINE RELIEF  
MEDICATIONS  (PRN):  acetaminophen     Tablet .. 650 milliGRAM(s) Oral every 6 hours PRN Temp greater or equal to 38C (100.4F), Mild Pain (1 - 3)  aluminum hydroxide/magnesium hydroxide/simethicone Suspension 30 milliLiter(s) Oral once PRN Dyspepsia  diphenhydrAMINE 50 milliGRAM(s) Oral every 6 hours PRN Agitation  haloperidol     Tablet 5 milliGRAM(s) Oral every 8 hours PRN Agitation  hydrOXYzine hydrochloride 50 milliGRAM(s) Oral every 6 hours PRN Anxiety or Insomnia  LORazepam     Tablet 2 milliGRAM(s) Oral every 8 hours PRN Agitation  nicotine  Polacrilex Gum 4 milliGRAM(s) Oral every 6 hours PRN Nicotine Dependence  SUMATRIPTAN 100 milliGRAM(s) 1 Tablet(s) Oral once PRN FOR MIGRAINE RELIEF  vitamin A &amp; D Ointment 1 Application(s) Topical two times a day PRN dry skin  
MEDICATIONS  (PRN):  acetaminophen     Tablet .. 650 milliGRAM(s) Oral every 6 hours PRN Temp greater or equal to 38C (100.4F), Mild Pain (1 - 3)  diphenhydrAMINE 50 milliGRAM(s) Oral every 6 hours PRN Agitation  haloperidol     Tablet 5 milliGRAM(s) Oral every 8 hours PRN Agitation  hydrOXYzine hydrochloride 50 milliGRAM(s) Oral every 6 hours PRN Anxiety or Insomnia  LORazepam     Tablet 2 milliGRAM(s) Oral every 8 hours PRN Agitation  nicotine  Polacrilex Gum 4 milliGRAM(s) Oral every 6 hours PRN Nicotine Dependence  SUMATRIPTAN 100 milliGRAM(s) 1 Tablet(s) Oral once PRN FOR MIGRAINE RELIEF  vitamin A &amp; D Ointment 1 Application(s) Topical two times a day PRN dry skin  
MEDICATIONS  (PRN):  acetaminophen     Tablet .. 650 milliGRAM(s) Oral every 6 hours PRN Temp greater or equal to 38C (100.4F), Mild Pain (1 - 3)  diphenhydrAMINE 50 milliGRAM(s) Oral every 6 hours PRN Agitation  haloperidol     Tablet 5 milliGRAM(s) Oral every 8 hours PRN Agitation  hydrOXYzine hydrochloride 50 milliGRAM(s) Oral every 6 hours PRN Anxiety or Insomnia  LORazepam     Tablet 2 milliGRAM(s) Oral every 8 hours PRN Agitation  nicotine  Polacrilex Gum 4 milliGRAM(s) Oral every 6 hours PRN Nicotine Dependence  SUMATRIPTAN 100 milliGRAM(s) 1 Tablet(s) Oral once PRN FOR MIGRAINE RELIEF  
MEDICATIONS  (PRN):  acetaminophen     Tablet .. 650 milliGRAM(s) Oral every 6 hours PRN Temp greater or equal to 38C (100.4F), Mild Pain (1 - 3)  aluminum hydroxide/magnesium hydroxide/simethicone Suspension 30 milliLiter(s) Oral once PRN Dyspepsia  diphenhydrAMINE 50 milliGRAM(s) Oral every 6 hours PRN Agitation  haloperidol     Tablet 5 milliGRAM(s) Oral every 8 hours PRN Agitation  hydrOXYzine hydrochloride 50 milliGRAM(s) Oral every 6 hours PRN Anxiety or Insomnia  LORazepam     Tablet 2 milliGRAM(s) Oral every 8 hours PRN Agitation  nicotine  Polacrilex Gum 4 milliGRAM(s) Oral every 6 hours PRN Nicotine Dependence  vitamin A &amp; D Ointment 1 Application(s) Topical two times a day PRN dry skin  
MEDICATIONS  (PRN):  acetaminophen     Tablet .. 650 milliGRAM(s) Oral every 6 hours PRN Temp greater or equal to 38C (100.4F), Mild Pain (1 - 3)  aluminum hydroxide/magnesium hydroxide/simethicone Suspension 30 milliLiter(s) Oral once PRN Dyspepsia  diphenhydrAMINE 50 milliGRAM(s) Oral every 6 hours PRN Agitation  haloperidol     Tablet 5 milliGRAM(s) Oral every 8 hours PRN Agitation  hydrOXYzine hydrochloride 50 milliGRAM(s) Oral every 6 hours PRN Anxiety or Insomnia  LORazepam     Tablet 2 milliGRAM(s) Oral every 8 hours PRN Agitation  nicotine  Polacrilex Gum 4 milliGRAM(s) Oral every 6 hours PRN Nicotine Dependence  vitamin A &amp; D Ointment 1 Application(s) Topical two times a day PRN dry skin
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
## 2023-06-30 NOTE — BH INPATIENT PSYCHIATRY PROGRESS NOTE - NSBHMETABOLIC_PSY_ALL_CORE_FT
BMI: BMI (kg/m2): 30.9 (06-23-23 @ 18:14)  HbA1c: A1C with Estimated Average Glucose Result: 5.6 % (06-24-23 @ 08:26)    Glucose:   BP: 118/73 (06-29-23 @ 20:33) (116/88 - 135/100)  Lipid Panel: Date/Time: 06-24-23 @ 08:26  Cholesterol, Serum: 196  Direct LDL: --  HDL Cholesterol, Serum: 34  Total Cholesterol/HDL Ration Measurement: --  Triglycerides, Serum: 228   BMI: BMI (kg/m2): 30.9 (06-23-23 @ 18:14)  HbA1c: A1C with Estimated Average Glucose Result: 5.6 % (06-24-23 @ 08:26)    Glucose:   BP: 100/56 (06-30-23 @ 07:38) (100/56 - 135/100)  Lipid Panel: Date/Time: 06-24-23 @ 08:26  Cholesterol, Serum: 196  Direct LDL: --  HDL Cholesterol, Serum: 34  Total Cholesterol/HDL Ration Measurement: --  Triglycerides, Serum: 228

## 2023-06-30 NOTE — BH INPATIENT PSYCHIATRY PROGRESS NOTE - NSTXCOPEINTERMD_PSY_ALL_CORE
prescription medicine management

## 2023-06-30 NOTE — BH DISCHARGE NOTE NURSING/SOCIAL WORK/PSYCH REHAB - PATIENT PORTAL LINK FT
You can access the FollowMyHealth Patient Portal offered by [facility] by registering at the following website: [email or web address]. By joining [employer]’s FollowMyHealth portal, you will also be able to view your health information using other applications (apps) compatible with our system.

## 2023-06-30 NOTE — BH INPATIENT PSYCHIATRY PROGRESS NOTE - NSICDXBHSECONDARYDX_PSY_ALL_CORE
LAUREN (generalized anxiety disorder)   F41.1  PTSD (post-traumatic stress disorder)   F43.10  

## 2023-06-30 NOTE — BH INPATIENT PSYCHIATRY PROGRESS NOTE - NSBHROSSYSTEMS_PSY_ALL_CORE
11/18/19    Internal medicine progress note.    Subjective   Awake but very limited interaction  Does not follow any commands    Ros limited.    Vitals 107/59   68  18  96.4    Skin pallor  Neck supple  Lung scattered crackles but adequate air flow   CVS S 1 and S 2  Systolic murmur  Abdomen bowel sound present peg is present   CNS very limited exam.    Recent Labs   Lab 11/13/19  0430 11/12/19  0514   WBC 21.3* 27.6*   HGB 7.8* 6.7*   HCT 26.0* 22.3*   * 124*     Recent Labs   Lab 11/18/19  0530 11/15/19  0443 11/13/19  0430   SODIUM 131* 135 133*   POTASSIUM 5.2* 4.2 4.1   CHLORIDE 101 104 101   CO2 25 26 22   BUN 39* 36* 40*   CREATININE 4.15* 4.05* 4.33*   GLUCOSE 253* 220* 251*   CALCIUM 9.5 9.4 8.8     Impression:   J96.01  Acute respiratory failure with hypoxia  J96.10 Chronic respiratory failure, unspecified whether with hypoxia or hypercapnia\  .N18.6 End stage renal disease  .I10 Essential Hypertension  G93.40 Encephalopathy, unspecified  D64.9 Anemia, unspecified  AV fistula infection  Cardiac arrest S/p CPR.  Legal blindness   E10.10    Type 1 diabetes mellitus with ketoacidosis without coma        Plan:  Was given 1 unit of packed RBC 11/13  hgn 7.7  Sputum cultures   MANY ESCHERICHIA COLI (Multiple drug resistant organism)  Antibiotics were changed to cefepime and Flagyl by Infectious Disease  Stool for C. Difficile was negative  reglan changed to IV   Continue supportive care  Continue vent support.  Continue tube feeding.  History of chronic hypertension currently on p.o. midodrine dose increased.  Continue wound care.  Subcutaneous heparin for DVT prophylaxis.  PT OT as tolerated.  Arrange hemodialysis.  Wound care consult.      Jeannie Pisano MD            
Psychiatric

## 2023-06-30 NOTE — BH INPATIENT PSYCHIATRY PROGRESS NOTE - NSBHFUPINTERVALCCFT_PSY_A_CORE
"Everything that happened to me, she says happened to her worse"
"My feelings don't really change much"
" im better " 
"Its always just anxiety and depression"
"half my family are pedophiles and half are pedophile enablers"
"Its just depression right?"
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
## 2023-06-30 NOTE — BH INPATIENT PSYCHIATRY PROGRESS NOTE - NSBHMSEKNOWHOW_PSY_ALL_CORE
Educational attainment/Vocabulary

## 2023-06-30 NOTE — BH INPATIENT PSYCHIATRY PROGRESS NOTE - NSBHATTESTCOMMENTATTENDFT_PSY_A_CORE
Case discussed with resident.  I concur with findings and plan. 

## 2023-06-30 NOTE — BH DISCHARGE NOTE NURSING/SOCIAL WORK/PSYCH REHAB - NSTOBACCOSMKCESSPRO_PSY_ALL_CORE
[facility]  [address] [place] [id number]  [phone]
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
## 2023-07-01 RX ORDER — ARIPIPRAZOLE 15 MG/1
2 TABLET ORAL DAILY
Refills: 0 | Status: DISCONTINUED | OUTPATIENT
Start: 2023-07-01 | End: 2023-07-03

## 2023-07-01 RX ADMIN — BUPROPION HYDROCHLORIDE 300 MILLIGRAM(S): 150 TABLET, EXTENDED RELEASE ORAL at 09:42

## 2023-07-01 RX ADMIN — BUPROPION HYDROCHLORIDE 150 MILLIGRAM(S): 150 TABLET, EXTENDED RELEASE ORAL at 09:42

## 2023-07-01 RX ADMIN — Medication 50 MILLIGRAM(S): at 20:21

## 2023-07-02 RX ORDER — ARIPIPRAZOLE 15 MG/1
1 TABLET ORAL
Qty: 14 | Refills: 0
Start: 2023-07-02 | End: 2023-07-15

## 2023-07-02 RX ORDER — HYDROXYZINE HCL 10 MG
1 TABLET ORAL
Qty: 28 | Refills: 0
Start: 2023-07-02 | End: 2023-07-15

## 2023-07-02 RX ADMIN — BUPROPION HYDROCHLORIDE 150 MILLIGRAM(S): 150 TABLET, EXTENDED RELEASE ORAL at 08:32

## 2023-07-02 RX ADMIN — BUPROPION HYDROCHLORIDE 300 MILLIGRAM(S): 150 TABLET, EXTENDED RELEASE ORAL at 08:32

## 2023-07-02 RX ADMIN — ARIPIPRAZOLE 2 MILLIGRAM(S): 15 TABLET ORAL at 08:32

## 2023-07-02 RX ADMIN — Medication 50 MILLIGRAM(S): at 20:13
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
## 2023-07-02 NOTE — BH INPATIENT PSYCHIATRY DISCHARGE NOTE - LEVEL OF CONSCIOUSNESS
Fatigue: Care Instructions Your Care Instructions Fatigue is a feeling of tiredness, exhaustion, or lack of energy. You may feel fatigue because of too much or not enough activity. It can also come from stress, lack of sleep, boredom, and poor diet. Many medical problems, such as viral infections, can cause fatigue. Emotional problems, especially depression, are often the cause of fatigue. Fatigue is most often a symptom of another problem. Treatment for fatigue depends on the cause. For example, if you have fatigue because you have a certain health problem, treating this problem also treats your fatigue. If depression or anxiety is the cause, treatment may help. Follow-up care is a key part of your treatment and safety. Be sure to make and go to all appointments, and call your doctor if you are having problems. It's also a good idea to know your test results and keep a list of the medicines you take. How can you care for yourself at home? · Get regular exercise. But don't overdo it. Go back and forth between rest and exercise. · Get plenty of rest. 
· Eat a healthy diet. Do not skip meals, especially breakfast. 
· Reduce your use of caffeine, tobacco, and alcohol. Caffeine is most often found in coffee, tea, cola drinks, and chocolate. · Limit medicines that can cause fatigue. This includes tranquilizers and cold and allergy medicines. When should you call for help? Watch closely for changes in your health, and be sure to contact your doctor if: 
  · You have new symptoms such as fever or a rash.  
  · Your fatigue gets worse.  
  · You have been feeling down, depressed, or hopeless. Or you may have lost interest in things that you usually enjoy.  
  · You are not getting better as expected. Where can you learn more? Go to http://fatou-olive.info/. Enter R925 in the search box to learn more about \"Fatigue: Care Instructions. \" Current as of: June 26, 2019 Content Version: 12.2 © 9658-9964 [employer]. Care instructions adapted under license by [employer] (which disclaims liability or warranty for this information). If you have questions about a medical condition or this instruction, always ask your healthcare professional. [address] any warranty or liability for your use of this information. Alert

## 2023-07-02 NOTE — BH INPATIENT PSYCHIATRY DISCHARGE NOTE - NSBHMETABOLIC_PSY_ALL_CORE_FT
BMI: BMI (kg/m2): 30.9 (06-23-23 @ 18:14)  HbA1c: A1C with Estimated Average Glucose Result: 5.6 % (06-24-23 @ 08:26)    Glucose:   BP: 119/74 (07-02-23 @ 09:21) (100/56 - 127/89)  Lipid Panel: Date/Time: 06-24-23 @ 08:26  Cholesterol, Serum: 196  Direct LDL: --  HDL Cholesterol, Serum: 34  Total Cholesterol/HDL Ration Measurement: --  Triglycerides, Serum: 228

## 2023-07-02 NOTE — BH INPATIENT PSYCHIATRY DISCHARGE NOTE - DESCRIPTION
27 year old engaged female employed at [employer], currently pursuing a Associates, possibly Bachelor's degree in Accounting from the [employer], currently on temporary medical leave due to migraines, domiciled in private apartment with estelle with estelle's mother and 14 year old brother. Born in [place], moved with Mother and brother to [place], then stayed in [place] for one, then moved to [place] two years ago. Patient is not in contact with brother in  or sister still in [place]. Patient is contact with her mother still.
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
## 2023-07-02 NOTE — BH INPATIENT PSYCHIATRY DISCHARGE NOTE - REASON FOR ADMISSION
You were admitted to the inpatient psychiatric hospital because you were experiencing depressed mood with thoughts of wanting to end your life.
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
## 2023-07-02 NOTE — BH INPATIENT PSYCHIATRY DISCHARGE NOTE - NSBHFUPINTERVALHXFT_PSY_A_CORE
Per nursing report the patient has been taking her medications and has been under good behavioral control.     Patient was interviewed this morning in the privacy of her room. Upon approach she was working on [employer]. She was calm and cooperative to interview. She states that she has chronic passive suicidal ideation but that "I know that I won't act on it" and "I feel safe to go home". She states that if she starts having more intense suicidal thoughts "I will go back to the ED, like I did this time". The patient denies any current suicidal ideation at this time.

## 2023-07-02 NOTE — BH INPATIENT PSYCHIATRY DISCHARGE NOTE - NSBHDCHANDOFFFT_PSY_ALL_CORE
- discussed that unfortunately there is no good medication that can help with post-void dribbling but encouraged pt to stand at the toilet for an extra 10-20 seconds after voiding to ensure he has completely emptied, reviewed how milk the urethra from the base to the tip to ensure he has completely emptied, double voiding, Kegel exercises and post void Kegel, as well as pelvic floor therapy.  Educational materials provided and reviewed for all of this
SW in contact with next provider

## 2023-07-02 NOTE — BH INPATIENT PSYCHIATRY DISCHARGE NOTE - OTHER PAST PSYCHIATRIC HISTORY (INCLUDE DETAILS REGARDING ONSET, COURSE OF ILLNESS, INPATIENT/OUTPATIENT TREATMENT)
PPH of MDD, LAUREN, PTSD, no prior IPP admissions, currently following with psychiatrist Dr. Lisa Rodriguez through [facility] service at [phone], and weekly therapy with Barbara Jimenez through her school (contact number [phone])

## 2023-07-02 NOTE — BH INPATIENT PSYCHIATRY DISCHARGE NOTE - NSDCCPCAREPLAN_GEN_ALL_CORE_FT
PRINCIPAL DISCHARGE DIAGNOSIS  Diagnosis: Major depressive disorder  Assessment and Plan of Treatment: Major depressive disorder (MDD) is a mental health condition. It may also be called clinical depression or unipolar depression. MDD causes symptoms of sadness, hopelessness, and loss of interest in things. These symptoms last most of the day, almost every day, for 2 weeks. MDD can also cause physical symptoms. It can interfere with relationships and with everyday activities, such as work, school, and activities that are usually pleasant.  You were started on the antidepressant Wellbutrin to help treat this condition and should continue to take it at home. You also will benefit for psychotherapy in the outpatient setting.      SECONDARY DISCHARGE DIAGNOSES  Diagnosis: PTSD (post-traumatic stress disorder)  Assessment and Plan of Treatment: Post-traumatic stress disorder (PTSD) is a mental health condition. It may start after a life-changing event or trauma. PTSD can also occur in people who hear about trauma that happens to a close family member or friend.

## 2023-07-02 NOTE — BH INPATIENT PSYCHIATRY DISCHARGE NOTE - NSDCMRMEDTOKEN_GEN_ALL_CORE_FT
ARIPiprazole 2 mg oral tablet: 1 tab(s) orally once a day MDD: 2mg  buPROPion 300 mg/24 hours (XL) oral tablet, extended release: 1 tab(s) orally once a day MDD: 300mg  hydrOXYzine hydrochloride 50 mg oral tablet: 1 tab(s) orally 2 times a day as needed for  anxiety MDD: 100mg  traZODone 50 mg oral tablet: 1 tab(s) orally once a day (at bedtime) MDD: 50mg

## 2023-07-02 NOTE — BH INPATIENT PSYCHIATRY DISCHARGE NOTE - NSBHDCMEDICALFT_PSY_A_CORE
#Chronic migraines  - Continue home medications of Propanolol PO 40 mg BID  - continue home medication of Sumatriptan 100mg q2 PRN daily (MDD: 200mg)  - Consider starting Zofran PRN for nausea    #Daily nicotine use  - Patient reports vaping nicotine daily  - Consider PRN Nicotine replacement therapy

## 2023-07-03 VITALS — DIASTOLIC BLOOD PRESSURE: 76 MMHG | RESPIRATION RATE: 18 BRPM | HEART RATE: 75 BPM | SYSTOLIC BLOOD PRESSURE: 114 MMHG

## 2023-07-03 RX ORDER — ARIPIPRAZOLE 15 MG/1
1 TABLET ORAL
Qty: 14 | Refills: 0
Start: 2023-07-03 | End: 2023-07-16

## 2023-07-03 RX ORDER — TRAZODONE HCL 50 MG
1 TABLET ORAL
Qty: 14 | Refills: 0
Start: 2023-07-03 | End: 2023-07-16

## 2023-07-03 RX ORDER — BUPROPION HYDROCHLORIDE 150 MG/1
1 TABLET, EXTENDED RELEASE ORAL
Qty: 14 | Refills: 0
Start: 2023-07-03 | End: 2023-07-16

## 2023-07-03 RX ADMIN — ARIPIPRAZOLE 2 MILLIGRAM(S): 15 TABLET ORAL at 08:27

## 2023-07-03 RX ADMIN — BUPROPION HYDROCHLORIDE 300 MILLIGRAM(S): 150 TABLET, EXTENDED RELEASE ORAL at 08:27

## 2023-07-03 RX ADMIN — Medication 50 MILLIGRAM(S): at 01:38

## 2023-07-03 RX ADMIN — BUPROPION HYDROCHLORIDE 150 MILLIGRAM(S): 150 TABLET, EXTENDED RELEASE ORAL at 08:27
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
## 2023-07-03 NOTE — BH CHART NOTE - NSDETAILSOTHERINTERV_PSY_ALL_CORE
Patient has maintained safe behavior while on unit. Is future oriented and treatment seeking, states she will return to ED if she feels she cannot control suicidal thoughts.

## 2023-07-07 DIAGNOSIS — F41.1 GENERALIZED ANXIETY DISORDER: ICD-10-CM

## 2023-07-07 DIAGNOSIS — F43.10 POST-TRAUMATIC STRESS DISORDER, UNSPECIFIED: ICD-10-CM

## 2023-07-07 DIAGNOSIS — G43.709 CHRONIC MIGRAINE WITHOUT AURA, NOT INTRACTABLE, WITHOUT STATUS MIGRAINOSUS: ICD-10-CM

## 2023-07-07 DIAGNOSIS — R45.851 SUICIDAL IDEATIONS: ICD-10-CM

## 2023-07-07 DIAGNOSIS — F32.9 MAJOR DEPRESSIVE DISORDER, SINGLE EPISODE, UNSPECIFIED: ICD-10-CM

## 2023-07-07 DIAGNOSIS — F17.200 NICOTINE DEPENDENCE, UNSPECIFIED, UNCOMPLICATED: ICD-10-CM

## 2023-07-10 ENCOUNTER — OUTPATIENT (OUTPATIENT)
Dept: OUTPATIENT SERVICES | Facility: HOSPITAL | Age: 28
LOS: 1 days | End: 2023-07-10
Payer: COMMERCIAL

## 2023-07-10 ENCOUNTER — NON-APPOINTMENT (OUTPATIENT)
Age: 28
End: 2023-07-10

## 2023-07-10 ENCOUNTER — APPOINTMENT (OUTPATIENT)
Dept: PSYCHIATRY | Facility: CLINIC | Age: 28
End: 2023-07-10

## 2023-07-10 DIAGNOSIS — F32.9 MAJOR DEPRESSIVE DISORDER, SINGLE EPISODE, UNSPECIFIED: ICD-10-CM

## 2023-07-10 PROCEDURE — 90791 PSYCH DIAGNOSTIC EVALUATION: CPT

## 2023-07-10 NOTE — BH SOCIAL WORK CONFIRMATION FOLLOW UP NOTE - NSLINKEDTOLOC_PSY_ALL_CORE
Mariela attended her outpatient mental health appointment at [facility] as scheduled, .

## 2023-07-11 DIAGNOSIS — F32.9 MAJOR DEPRESSIVE DISORDER, SINGLE EPISODE, UNSPECIFIED: ICD-10-CM

## 2023-07-11 NOTE — FAMILY HISTORY
[FreeTextEntry1] : Family composition: [ ]\par The patient resides with his fiance, his mother and brother\par Family history and background [ ]\par " no relationship with my family"\par Family relationship [ ]\par " i only communicate with my boyfriend and couple of friends"\par Pertinent Family Medical, MH and Substance Use History including Adult Child of Alcoholic and child of substance abuse status; history of cancer and heart disease\par \par " My dad was an alcoholic, most of my family smoke cigarette, not sure perhaps my mom" \par \par \par

## 2023-07-11 NOTE — SOCIAL HISTORY
[FreeTextEntry1] : Legal Status\par \par Does individual Served have a Legal Guardian, rep Payee or Conservatorship?\par \par [x ] No - not sure\par \par [ ] Yes - Details: [ ]\par \par  \par \par Legal Involvement history\par \par Does the individual have a history of or current involvement with the legal system?\par \par [x ] No\par \par [ ] Yes - Details: [ ]\par \par  \par \par  Services\par \par Have you ever served in the ?\par \par [ x] No\par \par [ ] Yes - Details: [ ]\par \par  \par \par Employment history [ ]\par " work at [employer]"\par  \par \par Developmental history [ ]\par \par  " n/a"\par \par Sexual hx/identity Sexual History/ Concern (include sexual orientation and other relevant information)  [ ]\par "heterosexual"\par \par Race - ethnicity - culture information [ ]\par \par  " the patient was born in [facility] and my parents are also born there" \par \par  \par \par Social supports (friends, Volunteers, club, AA meeting, other meetings ) ? [ ]\par \par  " boyfriend and couple of friends"\par \par Meaningful Activities: [ ]\par \par  " lost vandana doing things, like to play vide games, \par \par  \par \par Spiritual Assessment Tool - [employer]\par \par F. What is your amirah or belief? [ ]\par \par " not Episcopalian at all"\par Do you consider yourself spiritual or Episcopalian? [ ]\par " no for both"\par Is there something you believe in that gives meaning to your life? [ ]\par " i kind of want a career' \par I: Is it important in your life? [ ]\par " important in a since that it would have with the money" \par What influence does it have on how you take care of yourself? [ ]\par " it does,  need money" \par How have your beliefs influenced your behavior during this illness? What role do your beliefs play in regaining your health? [ ]\par " n/A"\par C. Are you part of a spiritual or Episcopalian community? [ ]\par "n/a"\par Is this of support to you and how? [ ]\par \par Is there a person or group of people you really love or who are really important to you? [ ]\par " my fiance and my two friends "\par H. We have been discussing your belief and supports. What else gives you internal support?[ ]\par  " I don’t really know, 3 people i car about"\par What are your sources of hope, strength, comfort and peace? [ ]\par " i live being alone, i feel best when i am 100 % a lone" \par What do you hold on to during difficult times? [ ]\par " my fiance"\par what sustains you and keeps you going? [ ]\par " my fiance and my friends"\par A. How would you like me, your healthcare provider, to address these issues in your healthcare? [ ]\par " i don’t think my situation will change, but i need meds, sometimes the anxiety is so bad i have a pain in my chest" \par  \par \par SMOKING CESSATION\par \par Do you Smoke?                              [ ] Yes [x ] No\par I vape- " 3,000 puffs lasts me for a month, but i am not looking to quit" \par \par Do you want to quit? [ ] Yes [ x] No\par \par -ASK\par \par · Number of cigatettes   [ ] cigars [ ]  pipe bowls [ ]  per day\par \par · Number of ST cans/ pouches per week [ ]\par \par · Number of years used [ ]\par \par · How soon after you wake up do you use tobacco?  [ ] within 30 minutes      [ ] more than 30 minutes\par \par · Previous quit attempts:  # of attempts [ ] longest quit period [ ] methods(s) used [ ]\par \par How long ago was last attempt to quit  [ ] years [ ] months\par \par · Reasons for wanting to quit[ ]\par \par -ADVISE   about the oral benefits of quitting\par \par -ASSESS   willingness to make a quit attempt (Stage of Change)\par \par    [ ] Precontemplation (Stop here & Reassess next visit) [ ] Contemplation [ ] Preparation  \par \par -ASSIST    (depending on stage of change)\par \par · Self-Help Pamphlets & Materials\par \par · List of local community group/individual quit programs and phone help lines\par \par · Encourage a quit date (for those who are ready)\par \par · Pharmacotherapy: Nicotine gum/ Lozenge/ Patch/ Inhaler/NS/Zyban/ Chantix\par \par   RX: [ ]  ()\par \par -ARRANGE  Follow up if set a quit date (with permission)\par \par Quit Date: [ ]  Phone calls or visits:  [ ] Week 1-2  Months   [ ] 1   [ ] 3   [ ] 6   [ ] 12  \par \par -Yearly Reassessment    [ ] No Changes of Smoking [ ] Change of Smoking Habit (Non-Smoker to Smoker/ Smoker to Non Smoker)\par \par (If there is change from Non Smoker to Smoker, please fill out new BRIEF TOBACCO CESSATION INTERVENTION FORM)\par \par Date of Yearly Reassessment : [ ]\par \par Comments:  [ ]\par \par  \par \par

## 2023-07-11 NOTE — REASON FOR VISIT
[Other:___] : [unfilled] [Patient] : Patient [[facility] Provider/Facility] : [facility] Provider/Facility [FreeTextEntry4] : 1145 am  [FreeTextEntry5] : 1:15pm [FreeTextEntry2] : SI, anxiety [FreeTextEntry1] : SI, anxiety
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
## 2023-07-11 NOTE — PSYCHOSOCIAL ASSESSMENT
[Yes (select details below)] : Have you ever experienced this type of event? Yes [has been constantly on guard, watchful, or easily startled] : has been constantly on guard, watchful, or easily startled [felt numb or detached from people, activities, or your surrounding] : has felt numb or detached from people, activities, or surroundings [felt guilty or unable to stop blaming yourself or others for the event(s) or any problems the event(s) may have caused] : has felt guilty or unable to stop blaming self or others for event(s), or any problems the event(s) may have caused [Financially stable] : financially stable [Other people unrelated to client] : Other people unrelated to client [No] : Prior or current active [place]  service? No [had nightmares about the event(s) or thought about the event(s) when you did not want] : did not have nightmares and/or unwanted thoughts about the events [tried hard not to think about the event(s) or went out of your way to avoid situations that reminded you of the event] : did not need to avoid thinking about events, did not need to avoid situations that might remind patient of events [FreeTextEntry1] : " i am on leave"  [FreeTextEntry3] : * we are ok now, my fiance provides" [FreeTextEntry7] : Yvette Kasper ( [facility])

## 2023-07-11 NOTE — RISK ASSESSMENT
[Yes] : Yes [In last 30 days] : in the last 30 days [No] : No [(4) Daily or almost daily] : Frequency: How many times have you had these thoughts? Daily or almost daily [(2) Less than 1 hour/some of the time] : Less than 1 hour/some of the time [(2) Can control thoughts with little difficulty] : Can control thoughts with little difficulty [(2) Deterrents probably stopped you] : Deterrents probably stopped you [Depressed mood/Anhedonia] : depressed mood/anhedonia [Severe anxiety, agitation or panic] : severe anxiety, agitation or panic [Recent inpatient discharge] : recent inpatient discharge [Chronic pain/other acute medical condition] : chronic pain or other acute medical condition [Perceived burden on family or others] : perceived burden on family or others [Other:______] : [unfilled] [Unable to Assess] : unable to assess [Residential stability] : residential stability [Relationship stability] : relationship stability [Engagement in treatment] : engagement in treatment [TextBox_32] : If i have the thoughts i call someone, " my fiance said that if i would come home and you are dead i would be devastating "  [FreeTextEntry2] : " unable to answer"  [FreeTextEntry4] : " constant anxiety, i am kind of useless now because of my migraines"  [FreeTextEntry5] : My fiance and couple of friends"

## 2023-07-11 NOTE — REASON FOR VISIT
[Other:___] : [unfilled] [Patient] : Patient [[facility] Provider/Facility] : [facility] Provider/Facility [FreeTextEntry4] : 1145 am  [FreeTextEntry5] : 1:15pm [FreeTextEntry2] : SI, anxiety [FreeTextEntry1] : SI, anxiety

## 2023-07-11 NOTE — PSYCHOSOCIAL ASSESSMENT
[Yes (select details below)] : Have you ever experienced this type of event? Yes [has been constantly on guard, watchful, or easily startled] : has been constantly on guard, watchful, or easily startled [felt numb or detached from people, activities, or your surrounding] : has felt numb or detached from people, activities, or surroundings [felt guilty or unable to stop blaming yourself or others for the event(s) or any problems the event(s) may have caused] : has felt guilty or unable to stop blaming self or others for event(s), or any problems the event(s) may have caused [Financially stable] : financially stable [Other people unrelated to client] : Other people unrelated to client [No] : Prior or current active [place]  service? No [had nightmares about the event(s) or thought about the event(s) when you did not want] : did not have nightmares and/or unwanted thoughts about the events [tried hard not to think about the event(s) or went out of your way to avoid situations that reminded you of the event] : did not need to avoid thinking about events, did not need to avoid situations that might remind patient of events [FreeTextEntry1] : " i am on leave"  [FreeTextEntry3] : * we are ok now, my fiance provides" [FreeTextEntry7] : Yvette Kasper ( [facility])

## 2023-07-11 NOTE — SOCIAL HISTORY
[FreeTextEntry1] : Legal Status\par \par Does individual Served have a Legal Guardian, rep Payee or Conservatorship?\par \par [x ] No - not sure\par \par [ ] Yes - Details: [ ]\par \par  \par \par Legal Involvement history\par \par Does the individual have a history of or current involvement with the legal system?\par \par [x ] No\par \par [ ] Yes - Details: [ ]\par \par  \par \par  Services\par \par Have you ever served in the ?\par \par [ x] No\par \par [ ] Yes - Details: [ ]\par \par  \par \par Employment history [ ]\par " work at [employer]"\par  \par \par Developmental history [ ]\par \par  " n/a"\par \par Sexual hx/identity Sexual History/ Concern (include sexual orientation and other relevant information)  [ ]\par "heterosexual"\par \par Race - ethnicity - culture information [ ]\par \par  " the patient was born in [facility] and my parents are also born there" \par \par  \par \par Social supports (friends, Volunteers, club, AA meeting, other meetings ) ? [ ]\par \par  " boyfriend and couple of friends"\par \par Meaningful Activities: [ ]\par \par  " lost vandana doing things, like to play vide games, \par \par  \par \par Spiritual Assessment Tool - FICA\par \par F. What is your amirah or belief? [ ]\par \par " not Latter-day at all"\par Do you consider yourself spiritual or Latter-day? [ ]\par " no for both"\par Is there something you believe in that gives meaning to your life? [ ]\par " i kind of want a career' \par I: Is it important in your life? [ ]\par " important in a since that it would have with the money" \par What influence does it have on how you take care of yourself? [ ]\par " it does,  need money" \par How have your beliefs influenced your behavior during this illness? What role do your beliefs play in regaining your health? [ ]\par " n/A"\par C. Are you part of a spiritual or Latter-day community? [ ]\par "n/a"\par Is this of support to you and how? [ ]\par \par Is there a person or group of people you really love or who are really important to you? [ ]\par " my fiance and my two friends "\par H. We have been discussing your belief and supports. What else gives you internal support?[ ]\par  " I don’t really know, 3 people i car about"\par What are your sources of hope, strength, comfort and peace? [ ]\par " i live being alone, i feel best when i am 100 % a lone" \par What do you hold on to during difficult times? [ ]\par " my fiance"\par what sustains you and keeps you going? [ ]\par " my fiance and my friends"\par A. How would you like me, your healthcare provider, to address these issues in your healthcare? [ ]\par " i don’t think my situation will change, but i need meds, sometimes the anxiety is so bad i have a pain in my chest" \par  \par \par SMOKING CESSATION\par \par Do you Smoke?                              [ ] Yes [x ] No\par I vape- " 3,000 puffs lasts me for a month, but i am not looking to quit" \par \par Do you want to quit? [ ] Yes [ x] No\par \par -ASK\par \par · Number of cigatettes   [ ] cigars [ ]  pipe bowls [ ]  per day\par \par · Number of ST cans/ pouches per week [ ]\par \par · Number of years used [ ]\par \par · How soon after you wake up do you use tobacco?  [ ] within 30 minutes      [ ] more than 30 minutes\par \par · Previous quit attempts:  # of attempts [ ] longest quit period [ ] methods(s) used [ ]\par \par How long ago was last attempt to quit  [ ] years [ ] months\par \par · Reasons for wanting to quit[ ]\par \par -ADVISE   about the oral benefits of quitting\par \par -ASSESS   willingness to make a quit attempt (Stage of Change)\par \par    [ ] Precontemplation (Stop here & Reassess next visit) [ ] Contemplation [ ] Preparation  \par \par -ASSIST    (depending on stage of change)\par \par · Self-Help Pamphlets & Materials\par \par · List of local community group/individual quit programs and phone help lines\par \par · Encourage a quit date (for those who are ready)\par \par · Pharmacotherapy: Nicotine gum/ Lozenge/ Patch/ Inhaler/NS/Zyban/ Chantix\par \par   RX: [ ]  ()\par \par -ARRANGE  Follow up if set a quit date (with permission)\par \par Quit Date: [ ]  Phone calls or visits:  [ ] Week 1-2  Months   [ ] 1   [ ] 3   [ ] 6   [ ] 12  \par \par -Yearly Reassessment    [ ] No Changes of Smoking [ ] Change of Smoking Habit (Non-Smoker to Smoker/ Smoker to Non Smoker)\par \par (If there is change from Non Smoker to Smoker, please fill out new BRIEF TOBACCO CESSATION INTERVENTION FORM)\par \par Date of Yearly Reassessment : [ ]\par \par Comments:  [ ]\par \par  \par \par

## 2023-07-17 ENCOUNTER — OUTPATIENT (OUTPATIENT)
Dept: OUTPATIENT SERVICES | Facility: HOSPITAL | Age: 28
LOS: 1 days | End: 2023-07-17
Payer: COMMERCIAL

## 2023-07-17 ENCOUNTER — APPOINTMENT (OUTPATIENT)
Dept: PSYCHIATRY | Facility: CLINIC | Age: 28
End: 2023-07-17

## 2023-07-17 DIAGNOSIS — F32.9 MAJOR DEPRESSIVE DISORDER, SINGLE EPISODE, UNSPECIFIED: ICD-10-CM

## 2023-07-17 PROCEDURE — 90832 PSYTX W PT 30 MINUTES: CPT

## 2023-07-18 DIAGNOSIS — F32.9 MAJOR DEPRESSIVE DISORDER, SINGLE EPISODE, UNSPECIFIED: ICD-10-CM

## 2023-07-20 ENCOUNTER — APPOINTMENT (OUTPATIENT)
Dept: NEUROLOGY | Facility: CLINIC | Age: 28
End: 2023-07-20
Payer: COMMERCIAL

## 2023-07-20 DIAGNOSIS — G43.011 MIGRAINE W/OUT AURA, INTRACTABLE, WITH STATUS MIGRAINOSUS: ICD-10-CM

## 2023-07-20 PROCEDURE — 64615 CHEMODENERV MUSC MIGRAINE: CPT

## 2023-07-20 PROCEDURE — 99213 OFFICE O/P EST LOW 20 MIN: CPT | Mod: 25

## 2023-07-20 RX ORDER — PROPRANOLOL HYDROCHLORIDE 40 MG/1
40 TABLET ORAL
Qty: 120 | Refills: 1 | Status: DISCONTINUED | COMMUNITY
Start: 2022-05-25 | End: 2023-07-20

## 2023-07-20 RX ADMIN — ONABOTULINUMTOXINA 1 UNIT: 100 INJECTION, POWDER, LYOPHILIZED, FOR SOLUTION INTRADERMAL; INTRAMUSCULAR at 00:00

## 2023-07-20 RX ADMIN — ONABOTULINUMTOXINA 55 UNIT: 100 INJECTION, POWDER, LYOPHILIZED, FOR SOLUTION INTRADERMAL; INTRAMUSCULAR at 00:00

## 2023-07-20 NOTE — ASSESSMENT
[FreeTextEntry1] : 27 year old woman with history of headaches since age of 14 is here as a follow up visit for headache management. She was diagnosed with migraine and was started on Emgality and us currently on Emgality every month and propanol 40 mg BID. Did not notice that much of improvement on Inderal but reported less intense headache on Botox. Stopped taking Inderal. Will add Topamax 25 mg BID \par \par \par - Continue Botox every three months \par - Continue Emgality \par - cw magnesium and Riboflavin \par - Add Topamax 25 mg BID \par - Stop Inderal \par - RTC in 3 months.

## 2023-07-20 NOTE — PHYSICAL EXAM
[FreeTextEntry1] : Mental status: Awake, alert and oriented x3.  Recent and remote memory intact.  Naming, repetition and comprehension intact.  Attention/concentration intact.  No dysarthria, no aphasia.  Fund of knowledge appropriate. \par \par Cranial nerves: Pupils equally round and reactive to light, visual fields full, no nystagmus, extraocular muscles intact, V1 through V3 intact bilaterally and symmetric, face symmetric, hearing intact.\par \par Motor:  Moves all extremities freely.  No abnormal movements. \par \par Gait: Narrow and steady. \par

## 2023-07-20 NOTE — PROCEDURE
[FreeTextEntry1] : Botulinum toxin for chronic migraine\par Risks and benefits of procedure explained. Consent for botulinum toxin for chronic migraine signed in chart. \par \par 200 Units botulinum toxin (Lot # [id number], exp: 11/2025 ) reconstituted with 4 ml 0.9NS (5U/0.1cc). \par \par Patient prepped with alcohol/ethyl chloride. 10U corrugators, 5U procerus, 20U frontalis, 40 temporalis, 30 occipitalis, 20 cervical paraspinals, 30 trapezius. Total of 155U divided in 31 sites. 45U botulinum wasted. \par \par Patient tolerated procedure well without complications. 
Detail Level: Generalized

## 2023-07-20 NOTE — HISTORY OF PRESENT ILLNESS
[FreeTextEntry1] : JOHN CURRY is a 27 year old woman is here as a follow up visit for chronic migraine and Botox injection. \par She currently takes propranolol 40 mg bid but does not find it is helping anymore. [place] in [place] on Emgality 120 mg 1 injection every 28 days. It was working, but she has not been taking it due to a shortage. She is now back on Emgality. She has Ubrelvy PRN, and Sumatriptan 100 mg PRN\par \par Recent studies include unremarkable Brain MRI, MRA head and normal MRV. \par \par \par

## 2023-07-21 ENCOUNTER — APPOINTMENT (OUTPATIENT)
Dept: PSYCHIATRY | Facility: CLINIC | Age: 28
End: 2023-07-21
Payer: COMMERCIAL

## 2023-07-21 ENCOUNTER — OUTPATIENT (OUTPATIENT)
Dept: OUTPATIENT SERVICES | Facility: HOSPITAL | Age: 28
LOS: 1 days | End: 2023-07-21
Payer: COMMERCIAL

## 2023-07-21 VITALS
BODY MASS INDEX: 31.58 KG/M2 | TEMPERATURE: 98.2 F | HEART RATE: 68 BPM | RESPIRATION RATE: 20 BRPM | HEIGHT: 64 IN | SYSTOLIC BLOOD PRESSURE: 117 MMHG | WEIGHT: 185 LBS | OXYGEN SATURATION: 99 % | DIASTOLIC BLOOD PRESSURE: 62 MMHG

## 2023-07-21 DIAGNOSIS — F32.9 MAJOR DEPRESSIVE DISORDER, SINGLE EPISODE, UNSPECIFIED: ICD-10-CM

## 2023-07-21 DIAGNOSIS — Z86.59 PERSONAL HISTORY OF OTHER MENTAL AND BEHAVIORAL DISORDERS: ICD-10-CM

## 2023-07-21 DIAGNOSIS — Z87.898 PERSONAL HISTORY OF OTHER SPECIFIED CONDITIONS: ICD-10-CM

## 2023-07-21 DIAGNOSIS — F41.1 GENERALIZED ANXIETY DISORDER: ICD-10-CM

## 2023-07-21 PROCEDURE — 90792 PSYCH DIAG EVAL W/MED SRVCS: CPT

## 2023-07-21 PROCEDURE — 90792 PSYCH DIAG EVAL W/MED SRVCS: CPT | Mod: 59

## 2023-07-21 PROCEDURE — 99401 PREV MED CNSL INDIV APPRX 15: CPT

## 2023-07-21 NOTE — ADDENDUM
[FreeTextEntry1] : Pt arrived to clinic for psychiatric evaluation, pt was assessed pt is ax04, pt states her mood is "okay". Active problems and medical history reviewed with pt. Pt states she has "migraines and insomnia" Non-medication methods of sleep and pain relief were provided for pt. Pt states "sleeping medication is helping right now" and states she is seeing a neurologist for migraines. Pt denies any other medical concerns at this time, pt vitals are stable.

## 2023-07-21 NOTE — DISCUSSION/SUMMARY
[Yes] : Yes [No] : No [Advised to schedule] : Advised to schedule [Not indicated at this time] : Not indicated at this time [Not clinically indicated] : Not clinically indicated [Does patient use tobacco products?] : Patient does not use tobacco products [Does patient use medical marijuana?] : Patient does not use medical marijuana. [Patient has been tested for HIV?] : Patient has not been tested for HIV [Patient has been tested for Hepatitis?] : Patient has not been tested for hepatitis [Patient would like to be tested/re-tested?] : patient would not like to be tested/re-tested [Current or past COVID-19 diagnosis?] : Patient had a present or past COVID-19 diagnosis [Vaccinated?] : is vaccinated [Education provided about COVID-19?] : Education provided about COVID-19 [FreeTextEntry5] : "insomnia and migraines" [FreeTextEntry8] : "migraines and poor sleep has a big affect on my mental health"

## 2023-07-21 NOTE — SOCIAL HISTORY
[FreeTextEntry1] : Legal Status\par \par Does individual Served have a Legal Guardian, rep Payee or Conservatorship?\par \par [x ] No - not sure\par \par [ ] Yes - Details: [ ]\par \par  \par \par Legal Involvement history\par \par Does the individual have a history of or current involvement with the legal system?\par \par [x ] No\par \par [ ] Yes - Details: [ ]\par \par  \par \par  Services\par \par Have you ever served in the ?\par \par [ x] No\par \par [ ] Yes - Details: [ ]\par \par  \par \par Employment history [ ]\par " work at [employer]"\par  \par \par Developmental history [ ]\par \par  " n/a"\par \par Sexual hx/identity Sexual History/ Concern (include sexual orientation and other relevant information)  [ ]\par "heterosexual"\par \par Race - ethnicity - culture information [ ]\par \par  " the patient was born in [facility] and my parents are also born there" \par \par  \par \par Social supports (friends, Volunteers, club, AA meeting, other meetings ) ? [ ]\par \par  " boyfriend and couple of friends"\par \par Meaningful Activities: [ ]\par \par  " lost vandana doing things, like to play vide games, \par \par  \par \par Spiritual Assessment Tool - FICA\par \par F. What is your amirah or belief? [ ]\par \par " not Lutheran at all"\par Do you consider yourself spiritual or Lutheran? [ ]\par " no for both"\par Is there something you believe in that gives meaning to your life? [ ]\par " i kind of want a career' \par I: Is it important in your life? [ ]\par " important in a since that it would have with the money" \par What influence does it have on how you take care of yourself? [ ]\par " it does,  need money" \par How have your beliefs influenced your behavior during this illness? What role do your beliefs play in regaining your health? [ ]\par " n/A"\par C. Are you part of a spiritual or Lutheran community? [ ]\par "n/a"\par Is this of support to you and how? [ ]\par \par Is there a person or group of people you really love or who are really important to you? [ ]\par " my fiance and my two friends "\par H. We have been discussing your belief and supports. What else gives you internal support?[ ]\par  " I don’t really know, 3 people i car about"\par What are your sources of hope, strength, comfort and peace? [ ]\par " i live being alone, i feel best when i am 100 % a lone" \par What do you hold on to during difficult times? [ ]\par " my fiance"\par what sustains you and keeps you going? [ ]\par " my fiance and my friends"\par A. How would you like me, your healthcare provider, to address these issues in your healthcare? [ ]\par " i don’t think my situation will change, but i need meds, sometimes the anxiety is so bad i have a pain in my chest" \par  \par \par SMOKING CESSATION\par \par Do you Smoke?                              [ ] Yes [x ] No\par I vape- " 3,000 puffs lasts me for a month, but i am not looking to quit" \par \par Do you want to quit? [ ] Yes [ x] No\par \par -ASK\par \par · Number of cigatettes   [ ] cigars [ ]  pipe bowls [ ]  per day\par \par · Number of ST cans/ pouches per week [ ]\par \par · Number of years used [ ]\par \par · How soon after you wake up do you use tobacco?  [ ] within 30 minutes      [ ] more than 30 minutes\par \par · Previous quit attempts:  # of attempts [ ] longest quit period [ ] methods(s) used [ ]\par \par How long ago was last attempt to quit  [ ] years [ ] months\par \par · Reasons for wanting to quit[ ]\par \par -ADVISE   about the oral benefits of quitting\par \par -ASSESS   willingness to make a quit attempt (Stage of Change)\par \par    [ ] Precontemplation (Stop here & Reassess next visit) [ ] Contemplation [ ] Preparation  \par \par -ASSIST    (depending on stage of change)\par \par · Self-Help Pamphlets & Materials\par \par · List of local community group/individual quit programs and phone help lines\par \par · Encourage a quit date (for those who are ready)\par \par · Pharmacotherapy: Nicotine gum/ Lozenge/ Patch/ Inhaler/NS/Zyban/ Chantix\par \par   RX: [ ]  ()\par \par -ARRANGE  Follow up if set a quit date (with permission)\par \par Quit Date: [ ]  Phone calls or visits:  [ ] Week 1-2  Months   [ ] 1   [ ] 3   [ ] 6   [ ] 12  \par \par -Yearly Reassessment    [ ] No Changes of Smoking [ ] Change of Smoking Habit (Non-Smoker to Smoker/ Smoker to Non Smoker)\par \par (If there is change from Non Smoker to Smoker, please fill out new BRIEF TOBACCO CESSATION INTERVENTION FORM)\par \par Date of Yearly Reassessment : [ ]\par \par Comments:  [ ]\par \par  \par \par
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
## 2023-07-21 NOTE — RISK ASSESSMENT
[Yes] : Yes [In last 30 days] : in the last 30 days [No] : No [(4) Daily or almost daily] : Frequency: How many times have you had these thoughts? Daily or almost daily [(2) Less than 1 hour/some of the time] : Less than 1 hour/some of the time [(2) Can control thoughts with little difficulty] : Can control thoughts with little difficulty [(2) Deterrents probably stopped you] : Deterrents probably stopped you [Depressed mood/Anhedonia] : depressed mood/anhedonia [Severe anxiety, agitation or panic] : severe anxiety, agitation or panic [Recent inpatient discharge] : recent inpatient discharge [Chronic pain/other acute medical condition] : chronic pain or other acute medical condition [Perceived burden on family or others] : perceived burden on family or others [Other:______] : [unfilled] [Unable to Assess] : unable to assess [Residential stability] : residential stability [Relationship stability] : relationship stability [Engagement in treatment] : engagement in treatment [Mood disorder] : mood disorder [PTSD] : PTSD [Cluster B Personality disorder/traits] : cluster B personality disorder/traits [Impulsivity] : impulsivity [Supportive social network of family or friends] : supportive social network of family or friends [Responsibility to children, family, or others] : responsibility to children, family, or others [TextBox_32] : If i have the thoughts i call someone, " my fiance said that if i would come home and you are dead i would be devastating "  [FreeTextEntry2] : " unable to answer"  [FreeTextEntry4] : " constant anxiety, i am kind of useless now because of my migraines"  [FreeTextEntry5] : My fiance and couple of friends" [None Known] : none known [No known risk factors] : No known risk factors
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
## 2023-07-21 NOTE — PSYCHOSOCIAL ASSESSMENT
[Yes (select details below)] : Have you ever experienced this type of event? Yes [has been constantly on guard, watchful, or easily startled] : has been constantly on guard, watchful, or easily startled [felt numb or detached from people, activities, or your surrounding] : has felt numb or detached from people, activities, or surroundings [felt guilty or unable to stop blaming yourself or others for the event(s) or any problems the event(s) may have caused] : has felt guilty or unable to stop blaming self or others for event(s), or any problems the event(s) may have caused [Financially stable] : financially stable [Other people unrelated to client] : Other people unrelated to client [No] : Prior or current active [place]  service? No [Yes, during lifetime] : Yes, during lifetime [_____] : Quantity: [unfilled] [FreeTextEntry2] : n [Yes (add details)] : Patient attended school, home tutoring, or received education instruction at anytime in the past three months? Yes [had nightmares about the event(s) or thought about the event(s) when you did not want] : did not have nightmares and/or unwanted thoughts about the events [tried hard not to think about the event(s) or went out of your way to avoid situations that reminded you of the event] : did not need to avoid thinking about events, did not need to avoid situations that might remind patient of events [FreeTextEntry1] : " i am on leave"  [FreeTextEntry3] : * we are ok now, my fiance provides" [FreeTextEntry7] : Yvette Kasper ( [facility])

## 2023-07-21 NOTE — REASON FOR VISIT
[Other:___] : [unfilled] [[facility] Provider/Facility] : [facility] Provider/Facility [Patient] : Patient [FreeTextEntry2] : SI, anxiety [FreeTextEntry1] : SI, anxiety

## 2023-07-21 NOTE — PHYSICAL EXAM
[Intermittent] : intermittent [Cooperative] : cooperative [Depressed] : depressed [Anxious] : anxious [Clear] : clear [Linear/Goal Directed] : linear/goal directed [None] : none [Depressive] : depressive [None Reported] : none reported [WNL] : within normal limits [Average] : average [Moderate] : moderate [de-identified] : impaired

## 2023-07-22 DIAGNOSIS — F41.1 GENERALIZED ANXIETY DISORDER: ICD-10-CM

## 2023-07-22 DIAGNOSIS — F32.9 MAJOR DEPRESSIVE DISORDER, SINGLE EPISODE, UNSPECIFIED: ICD-10-CM

## 2023-07-24 ENCOUNTER — OUTPATIENT (OUTPATIENT)
Dept: OUTPATIENT SERVICES | Facility: HOSPITAL | Age: 28
LOS: 1 days | End: 2023-07-24
Payer: COMMERCIAL

## 2023-07-24 ENCOUNTER — APPOINTMENT (OUTPATIENT)
Dept: PSYCHIATRY | Facility: CLINIC | Age: 28
End: 2023-07-24

## 2023-07-24 ENCOUNTER — NON-APPOINTMENT (OUTPATIENT)
Age: 28
End: 2023-07-24

## 2023-07-24 DIAGNOSIS — F41.0 PANIC DISORDER [EPISODIC PAROXYSMAL ANXIETY]: ICD-10-CM

## 2023-07-24 DIAGNOSIS — F33.1 MAJOR DEPRESSIVE DISORDER, RECURRENT, MODERATE: ICD-10-CM

## 2023-07-24 PROCEDURE — 90834 PSYTX W PT 45 MINUTES: CPT | Mod: 95

## 2023-07-25 DIAGNOSIS — F41.0 PANIC DISORDER [EPISODIC PAROXYSMAL ANXIETY]: ICD-10-CM

## 2023-07-25 NOTE — REASON FOR VISIT
[Home] : at home, [unfilled] , at the time of the visit. [Medical Office: ([facility])___] : at the medical office located in  [FreeTextEntry4] : 315pm [FreeTextEntry5] : 4pm [Patient] : Patient [FreeTextEntry1] : therapy f/u

## 2023-07-25 NOTE — PLAN
[FreeTextEntry2] : the treatment plan will be developed with the patient during the upcoming session.  [Motivational Interviewing] : Motivational Interviewing  [Psychodynamic Therapy] : Psychodynamic Therapy  [Psychoeducation] : Psychoeducation  [Skills training (all types)] : Skills training (all types)  [Supportive Therapy] : Supportive Therapy [de-identified] : The therapist met the patient via telehealth for the session. The patient reported that she seen psychiatrist for her intake this week and that her mood is the same. Most of the session the patient spoke about her difficulties socializing. The patient felt that socialization is useless, superficial and the patient gets anxious anytime she has to go somewhere to socialize. The patient provided an example when the patient had to go with her boyfriend for a BBQ and the patient was supposed to socialize. The patient also shared that she never formed any long-term relationships with friends and doesn’t really care about the friendship, “I like to be by myself”. The therapist and the patient spoke about the benefits of socialization and how it might help the patient with her future employment and being able to get assistance/help if she would need it. The patient said, “I guess it might help however I don’t care about scripted small talk with people I don't care about”.  The patient was educated that she is the one to choose the type of relationship, topics and people she communicates even thought. The patient smiled when the therapist asked the patient if she doesn’t like to talk to people because she is anxious or she is anxious because she talks to people. At the end of the session the patient stated that she is open to think if she would like socialization to be one of her treatment plan goals. The patient is looking forward to getting back to college on August 25, also the patient will talk to the psychiatrist about the assessment for the autism that the patient felt that she might have. The patient takes meds as prescribed. Throughout the session the patient was provided with active listening, motivational interviewing, emotional support and empathy.  [Recommended Frequency of Visits: ____] : Recommended frequency of visits: [unfilled] [Return in ____ week(s)] : Return in [unfilled] week(s) [FreeTextEntry1] : The therapist will meet the patient weekly

## 2023-07-25 NOTE — END OF VISIT
[Duration of Psychotherapy Visit (minutes spent in synchronous communication): ____] : Duration of Psychotherapy Visit (minutes spent in synchronous communication): [unfilled] [Individual Psychotherapy for 38-52 minutes] : Individual Psychotherapy for 38 - 52 minutes [Teletherapy Service Provided] : The services provided in this session were delivered via tele-therapy [FreeTextEntry3] : home [FreeTextEntry5] : office

## 2023-07-27 ENCOUNTER — LABORATORY RESULT (OUTPATIENT)
Age: 28
End: 2023-07-27

## 2023-07-27 ENCOUNTER — NON-APPOINTMENT (OUTPATIENT)
Age: 28
End: 2023-07-27

## 2023-07-27 ENCOUNTER — APPOINTMENT (OUTPATIENT)
Dept: RHEUMATOLOGY | Facility: CLINIC | Age: 28
End: 2023-07-27
Payer: COMMERCIAL

## 2023-07-27 VITALS
HEART RATE: 80 BPM | OXYGEN SATURATION: 98 % | BODY MASS INDEX: 31.58 KG/M2 | SYSTOLIC BLOOD PRESSURE: 116 MMHG | WEIGHT: 185 LBS | HEIGHT: 64 IN | DIASTOLIC BLOOD PRESSURE: 73 MMHG

## 2023-07-27 DIAGNOSIS — M25.531 PAIN IN RIGHT WRIST: ICD-10-CM

## 2023-07-27 DIAGNOSIS — M25.532 PAIN IN RIGHT WRIST: ICD-10-CM

## 2023-07-27 LAB
CK SERPL-CCNC: 41 U/L
CRP SERPL-MCNC: 7.4 MG/L

## 2023-07-27 PROCEDURE — 99204 OFFICE O/P NEW MOD 45 MIN: CPT

## 2023-07-27 NOTE — PHYSICAL EXAM
[General Appearance - Alert] : alert [General Appearance - In No Acute Distress] : in no acute distress [Sclera] : the sclera and conjunctiva were normal [PERRL With Normal Accommodation] : pupils were equal in size, round, and reactive to light [Extraocular Movements] : extraocular movements were intact [Outer Ear] : the ears and nose were normal in appearance [Oropharynx] : the oropharynx was normal [Neck Appearance] : the appearance of the neck was normal [Neck Cervical Mass (___cm)] : no neck mass was observed [Jugular Venous Distention Increased] : there was no jugular-venous distention [Thyroid Diffuse Enlargement] : the thyroid was not enlarged [Thyroid Nodule] : there were no palpable thyroid nodules [Auscultation Breath Sounds / Voice Sounds] : lungs were clear to auscultation bilaterally [Heart Rate And Rhythm] : heart rate was normal and rhythm regular [Heart Sounds] : normal S1 and S2 [Heart Sounds Gallop] : no gallops [Murmurs] : no murmurs [Heart Sounds Pericardial Friction Rub] : no pericardial rub [Bowel Sounds] : normal bowel sounds [Abdomen Soft] : soft [Abdomen Tenderness] : non-tender [] : no hepato-splenomegaly [Abdomen Mass (___ Cm)] : no abdominal mass palpated [Abnormal Walk] : normal gait [Nail Clubbing] : no clubbing  or cyanosis of the fingernails [Musculoskeletal - Swelling] : no joint swelling seen [Motor Tone] : muscle strength and tone were normal [Affect] : the affect was normal [Mood] : the mood was normal [FreeTextEntry1] : Right palm with dry skin, right leg lesion

## 2023-07-27 NOTE — HISTORY OF PRESENT ILLNESS
[FreeTextEntry1] : Patient reports developing bilateral wrist pain starting 3 years ago, no prior injury or trauma. Started feeling pain in b/l volar mid wrist felt radiating up to mid forearm a few times. She went to her PCP in [place] at the time, told it was due to overuse. States she is double jointed. Pain is progressively getting worse. After writing for 1 minute she has to stop due to pain. Any activity causes wrist pain. Lifting herself up from the bed with her hands causes her hands and wrist to give out. Now pain is in the fingers and feels like she is out in the cold. Morning time hands are very stiff for 30 minutes - 1 hour. Unsure if there is swelling. Pain comes and goes, worse with activity. Fingers are shaky when typing. Motrin OTC and anti inflammatory medication didn't help She works at [employer], last worked 6-8 months ago. \par \par She went to orthopedic surgery Dr Oshea and had wrist x-rays and MRI's that were unrevealing. Went to occupational therapy it didn't help\par \par Reports low back pain, burning in nature, unsure when it initially started. Wakes up with back pain, stiffness but unsure how long since she never paid attention to it. On good days it doesn't bother as much, but most days has back pain. Activity doesn't make back pain better or worse. Denies radiculopathy, rare numbness and tingling, + crawling sensation on legs. \par \par +knees crack always, denies pain\par +Sometimes has dry eyes and mouth, mouth sore, possible hair loss\par +Dry patch of skin on right palm and right leg lesion\par +sun sensitivity\par +frequent sinusitis\par \par Denies fevers, weight loss, night sweats, rash, raynaud's, nasal ulcers, visual disturbances, history of VTE, history of miscarriage, history of serositis \par \par Mom may have an autoimmune disease, arthritis? \par

## 2023-07-27 NOTE — REVIEW OF SYSTEMS
[Feeling Tired] : feeling tired [Shortness Of Breath] : shortness of breath [Abdominal Pain] : abdominal pain [Diarrhea] : diarrhea [Anxiety] : anxiety [Depression] : depression [Negative] : Neurological [Fever] : no fever [Cough] : no cough

## 2023-07-27 NOTE — ASSESSMENT
[FreeTextEntry1] : Bilateral wrist pain\par -patient with pain in the morning and stiffness and worsening symptoms with activity. Morning pain and stiffness may suggest inflammatory etiology but symptoms should get better with activity and not worse. Her x-rays and MRI's were essentially unrevealing for inflammation and arthritis\par -Check labs for autoimmune disease including RF, CCP, inflammatory markers, ANN MARIE, dsDNA, BRITTANY, ANCA, CPK, HLA B27, scleroderma, sjogrens\par

## 2023-07-28 ENCOUNTER — APPOINTMENT (OUTPATIENT)
Dept: PSYCHIATRY | Facility: CLINIC | Age: 28
End: 2023-07-28

## 2023-07-28 ENCOUNTER — OUTPATIENT (OUTPATIENT)
Dept: OUTPATIENT SERVICES | Facility: HOSPITAL | Age: 28
LOS: 1 days | End: 2023-07-28
Payer: COMMERCIAL

## 2023-07-28 ENCOUNTER — APPOINTMENT (OUTPATIENT)
Dept: PSYCHIATRY | Facility: CLINIC | Age: 28
End: 2023-07-28
Payer: COMMERCIAL

## 2023-07-28 DIAGNOSIS — Z86.59 PERSONAL HISTORY OF OTHER MENTAL AND BEHAVIORAL DISORDERS: ICD-10-CM

## 2023-07-28 DIAGNOSIS — F33.1 MAJOR DEPRESSIVE DISORDER, RECURRENT, MODERATE: ICD-10-CM

## 2023-07-28 DIAGNOSIS — F31.81 BIPOLAR II DISORDER: ICD-10-CM

## 2023-07-28 LAB
C3 SERPL-MCNC: 186 MG/DL
C4 SERPL-MCNC: 34 MG/DL
CCP AB SER IA-ACNC: <8 UNITS
ERYTHROCYTE [SEDIMENTATION RATE] IN BLOOD BY WESTERGREN METHOD: 20 MM/HR
HBV CORE IGG+IGM SER QL: NONREACTIVE
HBV CORE IGM SER QL: NONREACTIVE
HBV SURFACE AB SER QL: REACTIVE
HBV SURFACE AG SER QL: NONREACTIVE
HCV AB SER QL: NONREACTIVE
HCV S/CO RATIO: 0.12 S/CO
RF+CCP IGG SER-IMP: NEGATIVE
RHEUMATOID FACT SER QL: <10 IU/ML

## 2023-07-28 PROCEDURE — 99213 OFFICE O/P EST LOW 20 MIN: CPT | Mod: 95

## 2023-07-28 PROCEDURE — 99214 OFFICE O/P EST MOD 30 MIN: CPT | Mod: 95

## 2023-07-29 DIAGNOSIS — F31.81 BIPOLAR II DISORDER: ICD-10-CM

## 2023-07-31 ENCOUNTER — APPOINTMENT (OUTPATIENT)
Dept: PSYCHIATRY | Facility: CLINIC | Age: 28
End: 2023-07-31

## 2023-07-31 ENCOUNTER — OUTPATIENT (OUTPATIENT)
Dept: OUTPATIENT SERVICES | Facility: HOSPITAL | Age: 28
LOS: 1 days | End: 2023-07-31
Payer: COMMERCIAL

## 2023-07-31 DIAGNOSIS — F33.1 MAJOR DEPRESSIVE DISORDER, RECURRENT, MODERATE: ICD-10-CM

## 2023-07-31 DIAGNOSIS — F31.81 BIPOLAR II DISORDER: ICD-10-CM

## 2023-07-31 LAB
ALDOLASE SERPL-CCNC: 8.7 U/L
ANA SER IF-ACNC: NEGATIVE
DSDNA AB SER-ACNC: <12 IU/ML
ENA JO1 AB SER IA-ACNC: <0.2 AL
ENA RNP AB SER IA-ACNC: <0.2 AL
ENA SCL70 IGG SER IA-ACNC: <0.2 AL
ENA SM AB SER IA-ACNC: <0.2 AL
ENA SS-A AB SER IA-ACNC: <0.2 AL
ENA SS-B AB SER IA-ACNC: <0.2 AL

## 2023-07-31 PROCEDURE — 90834 PSYTX W PT 45 MINUTES: CPT | Mod: 95

## 2023-07-31 NOTE — PHYSICAL EXAM
[Intermittent] : intermittent [Cooperative] : cooperative [Depressed] : depressed [Anxious] : anxious [Clear] : clear [Linear/Goal Directed] : linear/goal directed [None] : none [Depressive] : depressive [None Reported] : none reported [WNL] : within normal limits [Average] : average [Moderate] : moderate [de-identified] : impaired

## 2023-07-31 NOTE — HISTORY OF PRESENT ILLNESS
[Suicidal Behavior/Ideation] : suicidal behavior/ideation [FreeTextEntry1] : Solo/tele F/U visit. We decreased cymbalta last time to 60mg at her request. She says that she feels more depressed and anxious after that. She wants to increase it back to 90mg. We also spoke about adding paxil to cymbalta, if she continues to feel depressed. Pt adamantly denies SI/HI/AH/PI at this time (however, she has baseline level of her chronic passive SI, that she has been experiencing for many years). She didn't cut self since our last session (though she felt an urge). She says that her love for her BF kept her away from doing so.  We spoke about DBT. Pt also wants to be worked up for autism. I emailed Moshe about pt's requests. [FreeTextEntry3] : prozac, wellbutrin, zoloft, effexor, vistaril, etc. Meds don't work

## 2023-07-31 NOTE — REASON FOR VISIT
[Other:___] : [unfilled] [[facility] Provider/Facility] : [facility] Provider/Facility [Patient] : Patient [FreeTextEntry2] : SI, anxiety [FreeTextEntry1] : SI, anxiety
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
## 2023-07-31 NOTE — SOCIAL HISTORY
[FreeTextEntry1] : Legal Status\par \par Does individual Served have a Legal Guardian, rep Payee or Conservatorship?\par \par [x ] No - not sure\par \par [ ] Yes - Details: [ ]\par \par  \par \par Legal Involvement history\par \par Does the individual have a history of or current involvement with the legal system?\par \par [x ] No\par \par [ ] Yes - Details: [ ]\par \par  \par \par  Services\par \par Have you ever served in the ?\par \par [ x] No\par \par [ ] Yes - Details: [ ]\par \par  \par \par Employment history [ ]\par " work at [employer]"\par  \par \par Developmental history [ ]\par \par  " n/a"\par \par Sexual hx/identity Sexual History/ Concern (include sexual orientation and other relevant information)  [ ]\par "heterosexual"\par \par Race - ethnicity - culture information [ ]\par \par  " the patient was born in [facility] and my parents are also born there" \par \par  \par \par Social supports (friends, Volunteers, club, AA meeting, other meetings ) ? [ ]\par \par  " boyfriend and couple of friends"\par \par Meaningful Activities: [ ]\par \par  " lost vandana doing things, like to play vide games, \par \par  \par \par Spiritual Assessment Tool - [employer]ICA\par \par F. What is your amirah or belief? [ ]\par \par " not Orthodox at all"\par Do you consider yourself spiritual or Orthodox? [ ]\par " no for both"\par Is there something you believe in that gives meaning to your life? [ ]\par " i kind of want a career' \par I: Is it important in your life? [ ]\par " important in a since that it would have with the money" \par What influence does it have on how you take care of yourself? [ ]\par " it does,  need money" \par How have your beliefs influenced your behavior during this illness? What role do your beliefs play in regaining your health? [ ]\par " n/A"\par C. Are you part of a spiritual or Orthodox community? [ ]\par "n/a"\par Is this of support to you and how? [ ]\par \par Is there a person or group of people you really love or who are really important to you? [ ]\par " my fiance and my two friends "\par H. We have been discussing your belief and supports. What else gives you internal support?[ ]\par  " I don?t really know, 3 people i car about"\par What are your sources of hope, strength, comfort and peace? [ ]\par " i live being alone, i feel best when i am 100 % a lone" \par What do you hold on to during difficult times? [ ]\par " my fiance"\par what sustains you and keeps you going? [ ]\par " my fiance and my friends"\par A. How would you like me, your healthcare provider, to address these issues in your healthcare? [ ]\par " i don?t think my situation will change, but i need meds, sometimes the anxiety is so bad i have a pain in my chest" \par  \par \par SMOKING CESSATION\par \par Do you Smoke?                              [ ] Yes [x ] No\par I vape- " 3,000 puffs lasts me for a month, but i am not looking to quit" \par \par Do you want to quit? [ ] Yes [ x] No\par \par -ASK\par \par · Number of cigatettes   [ ] cigars [ ]  pipe bowls [ ]  per day\par \par · Number of ST cans/ pouches per week [ ]\par \par · Number of years used [ ]\par \par · How soon after you wake up do you use tobacco?  [ ] within 30 minutes      [ ] more than 30 minutes\par \par · Previous quit attempts:  # of attempts [ ] longest quit period [ ] methods(s) used [ ]\par \par How long ago was last attempt to quit  [ ] years [ ] months\par \par · Reasons for wanting to quit[ ]\par \par -ADVISE   about the oral benefits of quitting\par \par -ASSESS   willingness to make a quit attempt (Stage of Change)\par \par    [ ] Precontemplation (Stop here & Reassess next visit) [ ] Contemplation [ ] Preparation  \par \par -ASSIST    (depending on stage of change)\par \par · Self-Help Pamphlets & Materials\par \par · List of local community group/individual quit programs and phone help lines\par \par · Encourage a quit date (for those who are ready)\par \par · Pharmacotherapy: Nicotine gum/ Lozenge/ Patch/ Inhaler/NS/Zyban/ Chantix\par \par   RX: [ ]  ()\par \par -ARRANGE  Follow up if set a quit date (with permission)\par \par Quit Date: [ ]  Phone calls or visits:  [ ] Week 1-2  Months   [ ] 1   [ ] 3   [ ] 6   [ ] 12  \par \par -Yearly Reassessment    [ ] No Changes of Smoking [ ] Change of Smoking Habit (Non-Smoker to Smoker/ Smoker to Non Smoker)\par \par (If there is change from Non Smoker to Smoker, please fill out new BRIEF TOBACCO CESSATION INTERVENTION FORM)\par \par Date of Yearly Reassessment : [ ]\par \par Comments:  [ ]\par \par  \par \par

## 2023-07-31 NOTE — RISK ASSESSMENT
[Yes] : Yes [In last 30 days] : in the last 30 days [No] : No [(4) Daily or almost daily] : Frequency: How many times have you had these thoughts? Daily or almost daily [(2) Less than 1 hour/some of the time] : Less than 1 hour/some of the time [(2) Can control thoughts with little difficulty] : Can control thoughts with little difficulty [(2) Deterrents probably stopped you] : Deterrents probably stopped you [Mood disorder] : mood disorder [PTSD] : PTSD [Cluster B Personality disorder/traits] : cluster B personality disorder/traits [Depressed mood/Anhedonia] : depressed mood/anhedonia [Impulsivity] : impulsivity [Severe anxiety, agitation or panic] : severe anxiety, agitation or panic [Recent inpatient discharge] : recent inpatient discharge [Chronic pain/other acute medical condition] : chronic pain or other acute medical condition [Perceived burden on family or others] : perceived burden on family or others [Supportive social network of family or friends] : supportive social network of family or friends [Responsibility to children, family, or others] : responsibility to children, family, or others [Other:______] : [unfilled] [Unable to Assess] : unable to assess [None Known] : none known [No known risk factors] : No known risk factors [Residential stability] : residential stability [Relationship stability] : relationship stability [Engagement in treatment] : engagement in treatment [No, patient denies ideation or behavior] : No, patient denies ideation or behavior [FreeTextEntry8] : Pt is not in imminent risk of hurting self at this time. But her chronic risk is elevated due to emotional dysregulation, chronic passive SI, impulsivity. Pt is instructed to call 911/suicide line/go to ER in case of an emergency. [TextBox_32] : If i have the thoughts i call someone, " my fiance said that if i would come home and you are dead i would be devastating "  [FreeTextEntry2] : " unable to answer"  [FreeTextEntry4] : " constant anxiety, i am kind of useless now because of my migraines"  [FreeTextEntry5] : My fiance and couple of friends"

## 2023-07-31 NOTE — PSYCHOSOCIAL ASSESSMENT
[Yes, during lifetime] : Yes, during lifetime [_____] : Quantity: [unfilled] [Yes (add details)] : Patient attended school, home tutoring, or received education instruction at anytime in the past three months? Yes [Yes (select details below)] : Have you ever experienced this type of event? Yes [has been constantly on guard, watchful, or easily startled] : has been constantly on guard, watchful, or easily startled [felt numb or detached from people, activities, or your surrounding] : has felt numb or detached from people, activities, or surroundings [felt guilty or unable to stop blaming yourself or others for the event(s) or any problems the event(s) may have caused] : has felt guilty or unable to stop blaming self or others for event(s), or any problems the event(s) may have caused [Financially stable] : financially stable [Other people unrelated to client] : Other people unrelated to client [No] : Prior or current active [place]  service? No [had nightmares about the event(s) or thought about the event(s) when you did not want] : did not have nightmares and/or unwanted thoughts about the events [tried hard not to think about the event(s) or went out of your way to avoid situations that reminded you of the event] : did not need to avoid thinking about events, did not need to avoid situations that might remind patient of events [FreeTextEntry1] : " i am on leave"  [FreeTextEntry3] : * we are ok now, my fiance provides" [FreeTextEntry7] : Yvette Kasper ( [facility])

## 2023-08-01 DIAGNOSIS — F31.81 BIPOLAR II DISORDER: ICD-10-CM

## 2023-08-01 LAB
B19V IGG SER QL IA: 5.29 INDEX
B19V IGG+IGM SER-IMP: NORMAL
B19V IGG+IGM SER-IMP: POSITIVE
B19V IGM FLD-ACNC: 0.13 INDEX
B19V IGM SER-ACNC: NEGATIVE
HLA-B27 QL NAA+PROBE: NORMAL

## 2023-08-04 ENCOUNTER — OUTPATIENT (OUTPATIENT)
Dept: OUTPATIENT SERVICES | Facility: HOSPITAL | Age: 28
LOS: 1 days | End: 2023-08-04
Payer: COMMERCIAL

## 2023-08-04 ENCOUNTER — APPOINTMENT (OUTPATIENT)
Dept: PSYCHIATRY | Facility: CLINIC | Age: 28
End: 2023-08-04
Payer: COMMERCIAL

## 2023-08-04 DIAGNOSIS — F33.1 MAJOR DEPRESSIVE DISORDER, RECURRENT, MODERATE: ICD-10-CM

## 2023-08-04 PROCEDURE — 99214 OFFICE O/P EST MOD 30 MIN: CPT

## 2023-08-04 NOTE — REASON FOR VISIT
[Other:___] : [unfilled] [[facility] Provider/Facility] : [facility] Provider/Facility [Patient] : Patient [FreeTextEntry2] : SI, anxiety [FreeTextEntry1] : SI, anxiety

## 2023-08-04 NOTE — PHYSICAL EXAM
[Intermittent] : intermittent [Cooperative] : cooperative [Depressed] : depressed [Anxious] : anxious [Constricted] : constricted [Clear] : clear [Linear/Goal Directed] : linear/goal directed [None] : none [Preoccupations/Ruminations] : preoccupations/ruminations [Depressive] : depressive [Self-Deprecatory] : self-deprecatory [None Reported] : none reported [WNL] : within normal limits [Average] : average [Moderate] : moderate [FreeTextEntry1] : tearful. Intermittent eye contact [FreeTextEntry5] : superficially [FreeTextEntry8] : very anxious  [de-identified] : impaired
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
## 2023-08-04 NOTE — RISK ASSESSMENT
[No, patient denies ideation or behavior] : No, patient denies ideation or behavior [FreeTextEntry8] : Pt is not in imminent risk of hurting self at this time. But her chronic risk is elevated due to emotional dysregulation, chronic passive SI, impulsivity, h/o SIB/SA Pt is instructed to call 911/suicide line/go to ER in case of an emergency. [Yes] : Yes [In last 30 days] : in the last 30 days [No] : No [(4) Daily or almost daily] : Frequency: How many times have you had these thoughts? Daily or almost daily [(2) Less than 1 hour/some of the time] : Less than 1 hour/some of the time [(2) Can control thoughts with little difficulty] : Can control thoughts with little difficulty [(2) Deterrents probably stopped you] : Deterrents probably stopped you [Mood disorder] : mood disorder [PTSD] : PTSD [Cluster B Personality disorder/traits] : cluster B personality disorder/traits [Depressed mood/Anhedonia] : depressed mood/anhedonia [Impulsivity] : impulsivity [Severe anxiety, agitation or panic] : severe anxiety, agitation or panic [Recent inpatient discharge] : recent inpatient discharge [Chronic pain/other acute medical condition] : chronic pain or other acute medical condition [Perceived burden on family or others] : perceived burden on family or others [Supportive social network of family or friends] : supportive social network of family or friends [Responsibility to children, family, or others] : responsibility to children, family, or others [Other:______] : [unfilled] [TextBox_32] : If i have the thoughts i call someone, " my fiance said that if i would come home and you are dead i would be devastating "  [FreeTextEntry2] : " unable to answer"  [FreeTextEntry4] : " constant anxiety, i am kind of useless now because of my migraines"  [FreeTextEntry5] : My fiance and couple of friends" [Unable to Assess] : unable to assess [None Known] : none known [No known risk factors] : No known risk factors [Residential stability] : residential stability [Relationship stability] : relationship stability [Engagement in treatment] : engagement in treatment
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
## 2023-08-04 NOTE — SOCIAL HISTORY
[FreeTextEntry1] : Legal Status\par  \par  Does individual Served have a Legal Guardian, rep Payee or Conservatorship?\par  \par  [x ] No - not sure\par  \par  [ ] Yes - Details: [ ]\par  \par   \par  \par  Legal Involvement history\par  \par  Does the individual have a history of or current involvement with the legal system?\par  \par  [x ] No\par  \par  [ ] Yes - Details: [ ]\par  \par   \par  \par   Services\par  \par  Have you ever served in the ?\par  \par  [ x] No\par  \par  [ ] Yes - Details: [ ]\par  \par   \par  \par  Employment history [ ]\par  " work at [employer]"\par   \par  \par  Developmental history [ ]\par  \par   " n/a"\par  \par  Sexual hx/identity Sexual History/ Concern (include sexual orientation and other relevant information)  [ ]\par  "heterosexual"\par  \par  Race - ethnicity - culture information [ ]\par  \par   " the patient was born in [facility] and my parents are also born there" \par  \par   \par  \par  Social supports (friends, Volunteers, club, AA meeting, other meetings ) ? [ ]\par  \par   " boyfriend and couple of friends"\par  \par  Meaningful Activities: [ ]\par  \par   " lost vandana doing things, like to play vide games, \par  \par   \par  \par  Spiritual Assessment Tool - [employer]ICA\par  \par  F. What is your amirah or belief? [ ]\par  \par  " not Baptism at all"\par  Do you consider yourself spiritual or Baptism? [ ]\par  " no for both"\par  Is there something you believe in that gives meaning to your life? [ ]\par  " i kind of want a career' \par  I: Is it important in your life? [ ]\par  " important in a since that it would have with the money" \par  What influence does it have on how you take care of yourself? [ ]\par  " it does,  need money" \par  How have your beliefs influenced your behavior during this illness? What role do your beliefs play in regaining your health? [ ]\par  " n/A"\par  C. Are you part of a spiritual or Baptism community? [ ]\par  "n/a"\par  Is this of support to you and how? [ ]\par  \par  Is there a person or group of people you really love or who are really important to you? [ ]\par  " my fiance and my two friends "\par  H. We have been discussing your belief and supports. What else gives you internal support?[ ]\par   " I don?t really know, 3 people i car about"\par  What are your sources of hope, strength, comfort and peace? [ ]\par  " i live being alone, i feel best when i am 100 % a lone" \par  What do you hold on to during difficult times? [ ]\par  " my fiance"\par  what sustains you and keeps you going? [ ]\par  " my fiance and my friends"\par  A. How would you like me, your healthcare provider, to address these issues in your healthcare? [ ]\par  " i don?t think my situation will change, but i need meds, sometimes the anxiety is so bad i have a pain in my chest" \par   \par  \par  SMOKING CESSATION\par  \par  Do you Smoke?                              [ ] Yes [x ] No\par  I vape- " 3,000 puffs lasts me for a month, but i am not looking to quit" \par  \par  Do you want to quit? [ ] Yes [ x] No\par  \par  -ASK\par  \par   Number of cigatettes   [ ] cigars [ ]  pipe bowls [ ]  per day\par  \par   Number of ST cans/ pouches per week [ ]\par  \par   Number of years used [ ]\par  \par   How soon after you wake up do you use tobacco?  [ ] within 30 minutes      [ ] more than 30 minutes\par  \par   Previous quit attempts:  # of attempts [ ] longest quit period [ ] methods(s) used [ ]\par  \par  How long ago was last attempt to quit  [ ] years [ ] months\par  \par   Reasons for wanting to quit[ ]\par  \par  -ADVISE   about the oral benefits of quitting\par  \par  -ASSESS   willingness to make a quit attempt (Stage of Change)\par  \par     [ ] Precontemplation (Stop here & Reassess next visit) [ ] Contemplation [ ] Preparation  \par  \par  -ASSIST    (depending on stage of change)\par  \par   Self-Help Pamphlets & Materials\par  \par   List of local community group/individual quit programs and phone help lines\par  \par   Encourage a quit date (for those who are ready)\par  \par   Pharmacotherapy: Nicotine gum/ Lozenge/ Patch/ Inhaler/NS/Zyban/ Chantix\par  \par    RX: [ ]  ()\par  \par  -ARRANGE  Follow up if set a quit date (with permission)\par  \par  Quit Date: [ ]  Phone calls or visits:  [ ] Week 1-2  Months   [ ] 1   [ ] 3   [ ] 6   [ ] 12  \par  \par  -Yearly Reassessment    [ ] No Changes of Smoking [ ] Change of Smoking Habit (Non-Smoker to Smoker/ Smoker to Non Smoker)\par  \par  (If there is change from Non Smoker to Smoker, please fill out new BRIEF TOBACCO CESSATION INTERVENTION FORM)\par  \par  Date of Yearly Reassessment : [ ]\par  \par  Comments:  [ ]\par  \par   \par  \par

## 2023-08-05 DIAGNOSIS — F33.1 MAJOR DEPRESSIVE DISORDER, RECURRENT, MODERATE: ICD-10-CM

## 2023-08-07 ENCOUNTER — OUTPATIENT (OUTPATIENT)
Dept: OUTPATIENT SERVICES | Facility: HOSPITAL | Age: 28
LOS: 1 days | End: 2023-08-07
Payer: COMMERCIAL

## 2023-08-07 ENCOUNTER — APPOINTMENT (OUTPATIENT)
Dept: PSYCHIATRY | Facility: CLINIC | Age: 28
End: 2023-08-07

## 2023-08-07 DIAGNOSIS — F33.1 MAJOR DEPRESSIVE DISORDER, RECURRENT, MODERATE: ICD-10-CM

## 2023-08-07 PROCEDURE — 90834 PSYTX W PT 45 MINUTES: CPT

## 2023-08-08 DIAGNOSIS — F33.1 MAJOR DEPRESSIVE DISORDER, RECURRENT, MODERATE: ICD-10-CM

## 2023-08-10 ENCOUNTER — APPOINTMENT (OUTPATIENT)
Dept: RHEUMATOLOGY | Facility: CLINIC | Age: 28
End: 2023-08-10
Payer: COMMERCIAL

## 2023-08-10 VITALS
DIASTOLIC BLOOD PRESSURE: 70 MMHG | HEART RATE: 97 BPM | HEIGHT: 64 IN | TEMPERATURE: 97.7 F | WEIGHT: 185 LBS | OXYGEN SATURATION: 98 % | SYSTOLIC BLOOD PRESSURE: 120 MMHG | BODY MASS INDEX: 31.58 KG/M2

## 2023-08-10 PROCEDURE — 99214 OFFICE O/P EST MOD 30 MIN: CPT

## 2023-08-11 ENCOUNTER — APPOINTMENT (OUTPATIENT)
Dept: PSYCHIATRY | Facility: CLINIC | Age: 28
End: 2023-08-11

## 2023-08-11 ENCOUNTER — OUTPATIENT (OUTPATIENT)
Dept: OUTPATIENT SERVICES | Facility: HOSPITAL | Age: 28
LOS: 1 days | End: 2023-08-11
Payer: COMMERCIAL

## 2023-08-11 ENCOUNTER — APPOINTMENT (OUTPATIENT)
Dept: PSYCHIATRY | Facility: CLINIC | Age: 28
End: 2023-08-11
Payer: COMMERCIAL

## 2023-08-11 DIAGNOSIS — F31.81 BIPOLAR II DISORDER: ICD-10-CM

## 2023-08-11 DIAGNOSIS — F41.1 GENERALIZED ANXIETY DISORDER: ICD-10-CM

## 2023-08-11 DIAGNOSIS — F33.1 MAJOR DEPRESSIVE DISORDER, RECURRENT, MODERATE: ICD-10-CM

## 2023-08-11 PROCEDURE — 90832 PSYTX W PT 30 MINUTES: CPT | Mod: 95

## 2023-08-11 PROCEDURE — 99214 OFFICE O/P EST MOD 30 MIN: CPT | Mod: 95

## 2023-08-11 RX ORDER — PAROXETINE HYDROCHLORIDE 10 MG/1
10 TABLET, FILM COATED ORAL
Qty: 30 | Refills: 0 | Status: DISCONTINUED | COMMUNITY
Start: 2023-08-04 | End: 2023-08-11
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
## 2023-08-11 NOTE — REASON FOR VISIT
[Other:___] : [unfilled] [[facility] Provider/Facility] : [facility] Provider/Facility [Patient] : Patient [FreeTextEntry2] : SI, anxiety [FreeTextEntry1] : SI, anxiety
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
## 2023-08-11 NOTE — SOCIAL HISTORY
[FreeTextEntry1] : Legal Status\par  \par  Does individual Served have a Legal Guardian, rep Payee or Conservatorship?\par  \par  [x ] No - not sure\par  \par  [ ] Yes - Details: [ ]\par  \par   \par  \par  Legal Involvement history\par  \par  Does the individual have a history of or current involvement with the legal system?\par  \par  [x ] No\par  \par  [ ] Yes - Details: [ ]\par  \par   \par  \par   Services\par  \par  Have you ever served in the ?\par  \par  [ x] No\par  \par  [ ] Yes - Details: [ ]\par  \par   \par  \par  Employment history [ ]\par  " work at [employer]"\par   \par  \par  Developmental history [ ]\par  \par   " n/a"\par  \par  Sexual hx/identity Sexual History/ Concern (include sexual orientation and other relevant information)  [ ]\par  "heterosexual"\par  \par  Race - ethnicity - culture information [ ]\par  \par   " the patient was born in [facility] and my parents are also born there" \par  \par   \par  \par  Social supports (friends, Volunteers, club, AA meeting, other meetings ) ? [ ]\par  \par   " boyfriend and couple of friends"\par  \par  Meaningful Activities: [ ]\par  \par   " lost vandana doing things, like to play vide games, \par  \par   \par  \par  Spiritual Assessment Tool - [employer]ICA\par  \par  F. What is your amirah or belief? [ ]\par  \par  " not Hinduism at all"\par  Do you consider yourself spiritual or Hinduism? [ ]\par  " no for both"\par  Is there something you believe in that gives meaning to your life? [ ]\par  " i kind of want a career' \par  I: Is it important in your life? [ ]\par  " important in a since that it would have with the money" \par  What influence does it have on how you take care of yourself? [ ]\par  " it does,  need money" \par  How have your beliefs influenced your behavior during this illness? What role do your beliefs play in regaining your health? [ ]\par  " n/A"\par  C. Are you part of a spiritual or Hinduism community? [ ]\par  "n/a"\par  Is this of support to you and how? [ ]\par  \par  Is there a person or group of people you really love or who are really important to you? [ ]\par  " my fiance and my two friends "\par  H. We have been discussing your belief and supports. What else gives you internal support?[ ]\par   " I don?t really know, 3 people i car about"\par  What are your sources of hope, strength, comfort and peace? [ ]\par  " i live being alone, i feel best when i am 100 % a lone" \par  What do you hold on to during difficult times? [ ]\par  " my fiance"\par  what sustains you and keeps you going? [ ]\par  " my fiance and my friends"\par  A. How would you like me, your healthcare provider, to address these issues in your healthcare? [ ]\par  " i don?t think my situation will change, but i need meds, sometimes the anxiety is so bad i have a pain in my chest" \par   \par  \par  SMOKING CESSATION\par  \par  Do you Smoke?                              [ ] Yes [x ] No\par  I vape- " 3,000 puffs lasts me for a month, but i am not looking to quit" \par  \par  Do you want to quit? [ ] Yes [ x] No\par  \par  -ASK\par  \par   Number of cigatettes   [ ] cigars [ ]  pipe bowls [ ]  per day\par  \par   Number of ST cans/ pouches per week [ ]\par  \par   Number of years used [ ]\par  \par   How soon after you wake up do you use tobacco?  [ ] within 30 minutes      [ ] more than 30 minutes\par  \par   Previous quit attempts:  # of attempts [ ] longest quit period [ ] methods(s) used [ ]\par  \par  How long ago was last attempt to quit  [ ] years [ ] months\par  \par   Reasons for wanting to quit[ ]\par  \par  -ADVISE   about the oral benefits of quitting\par  \par  -ASSESS   willingness to make a quit attempt (Stage of Change)\par  \par     [ ] Precontemplation (Stop here & Reassess next visit) [ ] Contemplation [ ] Preparation  \par  \par  -ASSIST    (depending on stage of change)\par  \par   Self-Help Pamphlets & Materials\par  \par   List of local community group/individual quit programs and phone help lines\par  \par   Encourage a quit date (for those who are ready)\par  \par   Pharmacotherapy: Nicotine gum/ Lozenge/ Patch/ Inhaler/NS/Zyban/ Chantix\par  \par    RX: [ ]  ()\par  \par  -ARRANGE  Follow up if set a quit date (with permission)\par  \par  Quit Date: [ ]  Phone calls or visits:  [ ] Week 1-2  Months   [ ] 1   [ ] 3   [ ] 6   [ ] 12  \par  \par  -Yearly Reassessment    [ ] No Changes of Smoking [ ] Change of Smoking Habit (Non-Smoker to Smoker/ Smoker to Non Smoker)\par  \par  (If there is change from Non Smoker to Smoker, please fill out new BRIEF TOBACCO CESSATION INTERVENTION FORM)\par  \par  Date of Yearly Reassessment : [ ]\par  \par  Comments:  [ ]\par  \par   \par  \par

## 2023-08-11 NOTE — RISK ASSESSMENT
[No, patient denies ideation or behavior] : No, patient denies ideation or behavior [Yes] : Yes [In last 30 days] : in the last 30 days [No] : No [(4) Daily or almost daily] : Frequency: How many times have you had these thoughts? Daily or almost daily [(2) Less than 1 hour/some of the time] : Less than 1 hour/some of the time [(2) Can control thoughts with little difficulty] : Can control thoughts with little difficulty [(2) Deterrents probably stopped you] : Deterrents probably stopped you [Mood disorder] : mood disorder [PTSD] : PTSD [Cluster B Personality disorder/traits] : cluster B personality disorder/traits [Depressed mood/Anhedonia] : depressed mood/anhedonia [Impulsivity] : impulsivity [Severe anxiety, agitation or panic] : severe anxiety, agitation or panic [Recent inpatient discharge] : recent inpatient discharge [Chronic pain/other acute medical condition] : chronic pain or other acute medical condition [Perceived burden on family or others] : perceived burden on family or others [Supportive social network of family or friends] : supportive social network of family or friends [Responsibility to children, family, or others] : responsibility to children, family, or others [Other:______] : [unfilled] [Unable to Assess] : unable to assess [None Known] : none known [No known risk factors] : No known risk factors [Residential stability] : residential stability [Relationship stability] : relationship stability [Engagement in treatment] : engagement in treatment [FreeTextEntry8] : Pt is not in imminent risk of hurting self at this time. But her chronic risk is elevated due to emotional dysregulation, chronic passive SI, impulsivity, h/o SIB/SA Pt is instructed to call 911/suicide line/go to ER in case of an emergency. [TextBox_32] : If i have the thoughts i call someone, " my fiance said that if i would come home and you are dead i would be devastating "  [FreeTextEntry2] : " unable to answer"  [FreeTextEntry4] : " constant anxiety, i am kind of useless now because of my migraines"  [FreeTextEntry5] : My fiance and couple of friends"

## 2023-08-12 DIAGNOSIS — F31.81 BIPOLAR II DISORDER: ICD-10-CM

## 2023-08-12 DIAGNOSIS — F41.1 GENERALIZED ANXIETY DISORDER: ICD-10-CM

## 2023-08-14 ENCOUNTER — APPOINTMENT (OUTPATIENT)
Dept: PSYCHIATRY | Facility: CLINIC | Age: 28
End: 2023-08-14

## 2023-08-14 ENCOUNTER — NON-APPOINTMENT (OUTPATIENT)
Age: 28
End: 2023-08-14

## 2023-08-14 ENCOUNTER — OUTPATIENT (OUTPATIENT)
Dept: OUTPATIENT SERVICES | Facility: HOSPITAL | Age: 28
LOS: 1 days | End: 2023-08-14
Payer: COMMERCIAL

## 2023-08-14 DIAGNOSIS — F31.81 BIPOLAR II DISORDER: ICD-10-CM

## 2023-08-14 DIAGNOSIS — F33.1 MAJOR DEPRESSIVE DISORDER, RECURRENT, MODERATE: ICD-10-CM

## 2023-08-14 PROCEDURE — 90834 PSYTX W PT 45 MINUTES: CPT

## 2023-08-15 ENCOUNTER — EMERGENCY (EMERGENCY)
Facility: HOSPITAL | Age: 28
LOS: 0 days | Discharge: HOME | End: 2023-08-15
Attending: EMERGENCY MEDICINE
Payer: COMMERCIAL

## 2023-08-15 VITALS
HEIGHT: 64 IN | DIASTOLIC BLOOD PRESSURE: 90 MMHG | TEMPERATURE: 98 F | SYSTOLIC BLOOD PRESSURE: 140 MMHG | RESPIRATION RATE: 18 BRPM | WEIGHT: 184.97 LBS | HEART RATE: 90 BPM | OXYGEN SATURATION: 99 %

## 2023-08-15 DIAGNOSIS — R07.89 OTHER CHEST PAIN: ICD-10-CM

## 2023-08-15 DIAGNOSIS — F17.290 NICOTINE DEPENDENCE, OTHER TOBACCO PRODUCT, UNCOMPLICATED: ICD-10-CM

## 2023-08-15 DIAGNOSIS — R10.13 EPIGASTRIC PAIN: ICD-10-CM

## 2023-08-15 DIAGNOSIS — F31.81 BIPOLAR II DISORDER: ICD-10-CM

## 2023-08-15 DIAGNOSIS — F32.A DEPRESSION, UNSPECIFIED: ICD-10-CM

## 2023-08-15 DIAGNOSIS — R11.2 NAUSEA WITH VOMITING, UNSPECIFIED: ICD-10-CM

## 2023-08-15 DIAGNOSIS — G43.909 MIGRAINE, UNSPECIFIED, NOT INTRACTABLE, WITHOUT STATUS MIGRAINOSUS: ICD-10-CM

## 2023-08-15 LAB
ALBUMIN SERPL ELPH-MCNC: 4.6 G/DL — SIGNIFICANT CHANGE UP (ref 3.5–5.2)
ALP SERPL-CCNC: 93 U/L — SIGNIFICANT CHANGE UP (ref 30–115)
ALT FLD-CCNC: 29 U/L — SIGNIFICANT CHANGE UP (ref 0–41)
ANION GAP SERPL CALC-SCNC: 14 MMOL/L — SIGNIFICANT CHANGE UP (ref 7–14)
AST SERPL-CCNC: 24 U/L — SIGNIFICANT CHANGE UP (ref 0–41)
BASOPHILS # BLD AUTO: 0.06 K/UL — SIGNIFICANT CHANGE UP (ref 0–0.2)
BASOPHILS NFR BLD AUTO: 0.6 % — SIGNIFICANT CHANGE UP (ref 0–1)
BILIRUB SERPL-MCNC: 0.3 MG/DL — SIGNIFICANT CHANGE UP (ref 0.2–1.2)
BUN SERPL-MCNC: 12 MG/DL — SIGNIFICANT CHANGE UP (ref 10–20)
CALCIUM SERPL-MCNC: 9.4 MG/DL — SIGNIFICANT CHANGE UP (ref 8.4–10.4)
CHLORIDE SERPL-SCNC: 104 MMOL/L — SIGNIFICANT CHANGE UP (ref 98–110)
CO2 SERPL-SCNC: 18 MMOL/L — SIGNIFICANT CHANGE UP (ref 17–32)
CREAT SERPL-MCNC: 0.9 MG/DL — SIGNIFICANT CHANGE UP (ref 0.7–1.5)
D DIMER BLD IA.RAPID-MCNC: 188 NG/ML DDU — SIGNIFICANT CHANGE UP
EGFR: 90 ML/MIN/1.73M2 — SIGNIFICANT CHANGE UP
EOSINOPHIL # BLD AUTO: 0.15 K/UL — SIGNIFICANT CHANGE UP (ref 0–0.7)
EOSINOPHIL NFR BLD AUTO: 1.4 % — SIGNIFICANT CHANGE UP (ref 0–8)
GLUCOSE SERPL-MCNC: 92 MG/DL — SIGNIFICANT CHANGE UP (ref 70–99)
HCG SERPL QL: NEGATIVE — SIGNIFICANT CHANGE UP
HCT VFR BLD CALC: 40.3 % — SIGNIFICANT CHANGE UP (ref 37–47)
HGB BLD-MCNC: 13.7 G/DL — SIGNIFICANT CHANGE UP (ref 12–16)
IMM GRANULOCYTES NFR BLD AUTO: 0.4 % — HIGH (ref 0.1–0.3)
LIDOCAIN IGE QN: 28 U/L — SIGNIFICANT CHANGE UP (ref 7–60)
LYMPHOCYTES # BLD AUTO: 1.94 K/UL — SIGNIFICANT CHANGE UP (ref 1.2–3.4)
LYMPHOCYTES # BLD AUTO: 18 % — LOW (ref 20.5–51.1)
MCHC RBC-ENTMCNC: 30.1 PG — SIGNIFICANT CHANGE UP (ref 27–31)
MCHC RBC-ENTMCNC: 34 G/DL — SIGNIFICANT CHANGE UP (ref 32–37)
MCV RBC AUTO: 88.6 FL — SIGNIFICANT CHANGE UP (ref 81–99)
MONOCYTES # BLD AUTO: 0.48 K/UL — SIGNIFICANT CHANGE UP (ref 0.1–0.6)
MONOCYTES NFR BLD AUTO: 4.5 % — SIGNIFICANT CHANGE UP (ref 1.7–9.3)
NEUTROPHILS # BLD AUTO: 8.1 K/UL — HIGH (ref 1.4–6.5)
NEUTROPHILS NFR BLD AUTO: 75.1 % — SIGNIFICANT CHANGE UP (ref 42.2–75.2)
NRBC # BLD: 0 /100 WBCS — SIGNIFICANT CHANGE UP (ref 0–0)
PLATELET # BLD AUTO: 263 K/UL — SIGNIFICANT CHANGE UP (ref 130–400)
PMV BLD: 11.7 FL — HIGH (ref 7.4–10.4)
POTASSIUM SERPL-MCNC: 4.6 MMOL/L — SIGNIFICANT CHANGE UP (ref 3.5–5)
POTASSIUM SERPL-SCNC: 4.6 MMOL/L — SIGNIFICANT CHANGE UP (ref 3.5–5)
PROT SERPL-MCNC: 7.3 G/DL — SIGNIFICANT CHANGE UP (ref 6–8)
RBC # BLD: 4.55 M/UL — SIGNIFICANT CHANGE UP (ref 4.2–5.4)
RBC # FLD: 12.9 % — SIGNIFICANT CHANGE UP (ref 11.5–14.5)
SODIUM SERPL-SCNC: 136 MMOL/L — SIGNIFICANT CHANGE UP (ref 135–146)
TROPONIN T SERPL-MCNC: <0.01 NG/ML — SIGNIFICANT CHANGE UP
WBC # BLD: 10.77 K/UL — SIGNIFICANT CHANGE UP (ref 4.8–10.8)
WBC # FLD AUTO: 10.77 K/UL — SIGNIFICANT CHANGE UP (ref 4.8–10.8)

## 2023-08-15 PROCEDURE — 96374 THER/PROPH/DIAG INJ IV PUSH: CPT

## 2023-08-15 PROCEDURE — 71045 X-RAY EXAM CHEST 1 VIEW: CPT

## 2023-08-15 PROCEDURE — 96375 TX/PRO/DX INJ NEW DRUG ADDON: CPT

## 2023-08-15 PROCEDURE — 99285 EMERGENCY DEPT VISIT HI MDM: CPT | Mod: 25

## 2023-08-15 PROCEDURE — 36415 COLL VENOUS BLD VENIPUNCTURE: CPT

## 2023-08-15 PROCEDURE — 85025 COMPLETE CBC W/AUTO DIFF WBC: CPT

## 2023-08-15 PROCEDURE — 84703 CHORIONIC GONADOTROPIN ASSAY: CPT

## 2023-08-15 PROCEDURE — 80053 COMPREHEN METABOLIC PANEL: CPT

## 2023-08-15 PROCEDURE — 93005 ELECTROCARDIOGRAM TRACING: CPT

## 2023-08-15 PROCEDURE — 84484 ASSAY OF TROPONIN QUANT: CPT

## 2023-08-15 PROCEDURE — 85379 FIBRIN DEGRADATION QUANT: CPT

## 2023-08-15 PROCEDURE — 83690 ASSAY OF LIPASE: CPT

## 2023-08-15 PROCEDURE — 99285 EMERGENCY DEPT VISIT HI MDM: CPT

## 2023-08-15 PROCEDURE — 71045 X-RAY EXAM CHEST 1 VIEW: CPT | Mod: 26

## 2023-08-15 PROCEDURE — 93010 ELECTROCARDIOGRAM REPORT: CPT

## 2023-08-15 RX ORDER — FAMOTIDINE 10 MG/ML
20 INJECTION INTRAVENOUS ONCE
Refills: 0 | Status: COMPLETED | OUTPATIENT
Start: 2023-08-15 | End: 2023-08-15

## 2023-08-15 RX ORDER — ONDANSETRON 8 MG/1
4 TABLET, FILM COATED ORAL ONCE
Refills: 0 | Status: COMPLETED | OUTPATIENT
Start: 2023-08-15 | End: 2023-08-15

## 2023-08-15 RX ORDER — SODIUM CHLORIDE 9 MG/ML
1000 INJECTION INTRAMUSCULAR; INTRAVENOUS; SUBCUTANEOUS ONCE
Refills: 0 | Status: COMPLETED | OUTPATIENT
Start: 2023-08-15 | End: 2023-08-15

## 2023-08-15 RX ADMIN — SODIUM CHLORIDE 1000 MILLILITER(S): 9 INJECTION INTRAMUSCULAR; INTRAVENOUS; SUBCUTANEOUS at 16:40

## 2023-08-15 RX ADMIN — FAMOTIDINE 20 MILLIGRAM(S): 10 INJECTION INTRAVENOUS at 16:41

## 2023-08-15 RX ADMIN — ONDANSETRON 4 MILLIGRAM(S): 8 TABLET, FILM COATED ORAL at 16:40

## 2023-08-15 NOTE — ED PROVIDER NOTE - PATIENT PORTAL LINK FT
You can access the FollowMyHealth Patient Portal offered by [facility] by registering at the following website: [email or web address]. By joining [facility]’s FollowMyHealth portal, you will also be able to view your health information using other applications (apps) compatible with our system.

## 2023-08-15 NOTE — REASON FOR VISIT
[Patient preference] : as per patient preference [Telehealth (audio & video) - Individual/Group] : This visit was provided via telehealth using real-time 2-way audio visual technology. [Medical Office: ([facility])___] : The provider was located at the medical office in [unfilled]. [Home] : The patient, [unfilled], was located at home, [unfilled], at the time of the visit. [Verbal consent obtained from patient/other participant(s)] : Verbal consent for telehealth/telephonic services obtained from patient/other participant(s) [Patient] : Patient [FreeTextEntry4] : 2:00PM [FreeTextEntry5] : 2:30PM
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
## 2023-08-15 NOTE — RISK ASSESSMENT
[Yes, patient reports ideation or behavior] : Yes, patient reports ideation or behavior [No, patient denies ideation or behavior] : No, patient denies ideation or behavior [Yes] : Yes [No] : No [FreeTextEntry8] : At time of session, patient reported the presence of passive suicidal thoughts (she experiences these thoughts at baseline). She adamantly denied the presence of suicidal plan, intent or concerns for safety. No recent self-harming action despite urges, although motivation for this appears to be more external than internal.

## 2023-08-15 NOTE — ED PROVIDER NOTE - PHYSICAL EXAMINATION
Constitutional: Well developed, well nourished, no acute distress  Head: Normocephalic, Atraumatic  Eyes: PERRLA, EOMI, conjunctiva and sclera WNL  ENT: Moist mucous membranes, no rhinorrhea  Neck: Supple, Nontender, normal thyroid, No acute cervical adenopathy  Respiratory: Normal chest excursion with respiration; Breath sounds clear and equal B/L; No wheezes, rales, or rhonchi   Cardiovascular: RRR; Normal S1, S2; No murmurs, rubs or gallops   ABD/GI: Normal bowel sounds; Nondistended; Nontender; No guarding, rigidity or rebound; No CVA tenderness  EXT/MS: Moving all extremities; Distal pulses 2+ B/L; No peripheral edema  Skin: Normal for age and race; Warm and dry; No rash  Neurologic: AAO x 4; Normal motor and sensory function  Psychiatric: Appropriate affect, normal mood

## 2023-08-15 NOTE — ED PROVIDER NOTE - CLINICAL SUMMARY MEDICAL DECISION MAKING FREE TEXT BOX
Patient reports improvement in nausea and epigastric pain and while in ER.  Abdomen soft and nontender on exam.  No vomiting while in ER.  + Tolerating p.o.  Labs reviewed: CBC/CMP unremarkable.  Troponin/D-dimer negative.  EKG with no acute ischemic changes.  Heart score is 0.  To d/c home, patient will follow-up with her PMD, told to return to ER if she feels worse, or for any new/concerning symptoms.  Patient understands and agrees with plan.

## 2023-08-15 NOTE — ED ADULT TRIAGE NOTE - CHIEF COMPLAINT QUOTE
Patient c/o intermittent Mid sternal CP that radiates to back,  N/V, and epi gastric pain x 1 week. Patient denies fevers and  SOB

## 2023-08-15 NOTE — ED PROVIDER NOTE - OBJECTIVE STATEMENT
27-year-old female with past medical history of migraines and depression presenting with a 1 and half weeks of midsternal chest pain.  Patient states pain is worse in the epigastric region.  She endorses nausea and 2 episodes of vomiting.  Pain is exacerbated by deep inhalation.  It is better when patient is lying flat.  Patient also reports she has difficulty eating because she feels the food is getting stuck in her throat.  Denies fever, chills, lightheadedness, dizziness, shortness of breath,  symptoms.  No recent travel, no contraceptives, family cardiac history unknown.

## 2023-08-15 NOTE — ED PROVIDER NOTE - ATTENDING APP SHARED VISIT CONTRIBUTION OF CARE
27-year-old female with history of migraines, depression, in ER with c/o 1-1/2-week epigastric pain/CP.  States having burning epigastric pain radiating up into her chest and throat.  Associated with nausea, had some episodes of vomiting earlier.  Also having intermittent episodes of sharp CP associated with SOB, which lasted few minutes and then resolves completely.  No LE pain/swelling.  No recent travel.  No hormone use.  No lower abdominal pain.  No diarrhea.  No F/C.  No urinary symptoms.  No HA/dizziness/syncope.  PE - nad, nc/at, eomi, perrl, op - clear, mmm, neck supple, cta b/l, no w/r/r, rrr, abd- soft, nt/nd, nabs, from x 4, no LE swelling/tenderness, A&O x 3, cn 2-12 intact, no focal motor/sensory deficits.   -Check labs, EKG, CXR.

## 2023-08-17 NOTE — ASSESSMENT
[FreeTextEntry1] : Bilateral wrist pain -patient with pain in the morning and stiffness and worsening symptoms with activity. Morning pain and stiffness may suggest inflammatory etiology but symptoms should get better with activity and not worse, as what happens in degenerative arthritis. Her x-rays and MRI's were essentially unrevealing for inflammation and arthritis -Her labs for connective tissue disease show mild elevation in CRP -We discussed treatment options including as needed tramadol or duloxetine; I asked her to reach out to her behavior health specialist and see if either of these medications can be used as she is on trazodone and paroxetine and combination therapy can put her at risk for serotonin syndrome. If she is unable to use these medications then we discussed gabapentin and hydroxychloroquine8 8/17/23: I spoke with patient.  Start tramadol for severe pain as needed.

## 2023-08-17 NOTE — HISTORY OF PRESENT ILLNESS
[FreeTextEntry1] : 8/10/23: Pt here for follow up. Reports continued wrist pain present in the morning with 30 minutes - 1 hour of stiffness. During the day if she is holding an object such as when she cooks and holds a bowl of pasta her wrists will hurt on radial and ulnar side. At night time her wrists will hurt her depending on what she did during the day. She wraps her wrists for 1 hour that gives partial relief. She doesn't take any medications, in the past she took meloxicam from orthopedics that didnt help. Reports being very flexible when younger and thinks her pain is a result of this.   Initial visit: Patient reports developing bilateral wrist pain starting 3 years ago, no prior injury or trauma. Started feeling pain in b/l volar mid wrist felt radiating up to mid forearm a few times. She went to her PCP in [place] at the time, told it was due to overuse. States she is double jointed. Pain is progressively getting worse. After writing for 1 minute she has to stop due to pain. Any activity causes wrist pain. Lifting herself up from the bed with her hands causes her hands and wrist to give out. Now pain is in the fingers and feels like she is out in the cold. Morning time hands are very stiff for 30 minutes - 1 hour. Unsure if there is swelling. Pain comes and goes, worse with activity. Fingers are shaky when typing. Motrin OTC and anti inflammatory medication didn't help She works at [employer], last worked 6-8 months ago.   She went to orthopedic surgery Dr Oshea and had wrist x-rays and MRI's that were unrevealing. Went to occupational therapy it didn't help  Reports low back pain, burning in nature, unsure when it initially started. Wakes up with back pain, stiffness but unsure how long since she never paid attention to it. On good days it doesn't bother as much, but most days has back pain. Activity doesn't make back pain better or worse. Denies radiculopathy, rare numbness and tingling, + crawling sensation on legs.   +knees crack always, denies pain +Sometimes has dry eyes and mouth, mouth sore, possible hair loss +Dry patch of skin on right palm and right leg lesion +sun sensitivity +frequent sinusitis  Denies fevers, weight loss, night sweats, rash, raynaud's, nasal ulcers, visual disturbances, history of VTE, history of miscarriage, history of serositis   Mom may have an autoimmune disease, arthritis?

## 2023-08-18 ENCOUNTER — APPOINTMENT (OUTPATIENT)
Dept: PSYCHIATRY | Facility: CLINIC | Age: 28
End: 2023-08-18
Payer: COMMERCIAL

## 2023-08-18 ENCOUNTER — OUTPATIENT (OUTPATIENT)
Dept: OUTPATIENT SERVICES | Facility: HOSPITAL | Age: 28
LOS: 1 days | End: 2023-08-18
Payer: COMMERCIAL

## 2023-08-18 DIAGNOSIS — F33.1 MAJOR DEPRESSIVE DISORDER, RECURRENT, MODERATE: ICD-10-CM

## 2023-08-18 PROCEDURE — 99214 OFFICE O/P EST MOD 30 MIN: CPT | Mod: 95

## 2023-08-18 NOTE — SOCIAL HISTORY
[FreeTextEntry1] : Legal Status\par  \par  Does individual Served have a Legal Guardian, rep Payee or Conservatorship?\par  \par  [x ] No - not sure\par  \par  [ ] Yes - Details: [ ]\par  \par   \par  \par  Legal Involvement history\par  \par  Does the individual have a history of or current involvement with the legal system?\par  \par  [x ] No\par  \par  [ ] Yes - Details: [ ]\par  \par   \par  \par   Services\par  \par  Have you ever served in the ?\par  \par  [ x] No\par  \par  [ ] Yes - Details: [ ]\par  \par   \par  \par  Employment history [ ]\par  " work at [employer]"\par   \par  \par  Developmental history [ ]\par  \par   " n/a"\par  \par  Sexual hx/identity Sexual History/ Concern (include sexual orientation and other relevant information)  [ ]\par  "heterosexual"\par  \par  Race - ethnicity - culture information [ ]\par  \par   " the patient was born in [facility] and my parents are also born there" \par  \par   \par  \par  Social supports (friends, Volunteers, club, AA meeting, other meetings ) ? [ ]\par  \par   " boyfriend and couple of friends"\par  \par  Meaningful Activities: [ ]\par  \par   " lost vandana doing things, like to play vide games, \par  \par   \par  \par  Spiritual Assessment Tool - [employer]\par  \par  F. What is your amirah or belief? [ ]\par  \par  " not Zoroastrian at all"\par  Do you consider yourself spiritual or Zoroastrian? [ ]\par  " no for both"\par  Is there something you believe in that gives meaning to your life? [ ]\par  " i kind of want a career' \par  I: Is it important in your life? [ ]\par  " important in a since that it would have with the money" \par  What influence does it have on how you take care of yourself? [ ]\par  " it does,  need money" \par  How have your beliefs influenced your behavior during this illness? What role do your beliefs play in regaining your health? [ ]\par  " n/A"\par  C. Are you part of a spiritual or Zoroastrian community? [ ]\par  "n/a"\par  Is this of support to you and how? [ ]\par  \par  Is there a person or group of people you really love or who are really important to you? [ ]\par  " my fiance and my two friends "\par  H. We have been discussing your belief and supports. What else gives you internal support?[ ]\par   " I don?t really know, 3 people i car about"\par  What are your sources of hope, strength, comfort and peace? [ ]\par  " i live being alone, i feel best when i am 100 % a lone" \par  What do you hold on to during difficult times? [ ]\par  " my fiance"\par  what sustains you and keeps you going? [ ]\par  " my fiance and my friends"\par  A. How would you like me, your healthcare provider, to address these issues in your healthcare? [ ]\par  " i don?t think my situation will change, but i need meds, sometimes the anxiety is so bad i have a pain in my chest" \par   \par  \par  SMOKING CESSATION\par  \par  Do you Smoke?                              [ ] Yes [x ] No\par  I vape- " 3,000 puffs lasts me for a month, but i am not looking to quit" \par  \par  Do you want to quit? [ ] Yes [ x] No\par  \par  -ASK\par  \par   Number of cigatettes   [ ] cigars [ ]  pipe bowls [ ]  per day\par  \par   Number of ST cans/ pouches per week [ ]\par  \par   Number of years used [ ]\par  \par   How soon after you wake up do you use tobacco?  [ ] within 30 minutes      [ ] more than 30 minutes\par  \par   Previous quit attempts:  # of attempts [ ] longest quit period [ ] methods(s) used [ ]\par  \par  How long ago was last attempt to quit  [ ] years [ ] months\par  \par   Reasons for wanting to quit[ ]\par  \par  -ADVISE   about the oral benefits of quitting\par  \par  -ASSESS   willingness to make a quit attempt (Stage of Change)\par  \par     [ ] Precontemplation (Stop here & Reassess next visit) [ ] Contemplation [ ] Preparation  \par  \par  -ASSIST    (depending on stage of change)\par  \par   Self-Help Pamphlets & Materials\par  \par   List of local community group/individual quit programs and phone help lines\par  \par   Encourage a quit date (for those who are ready)\par  \par   Pharmacotherapy: Nicotine gum/ Lozenge/ Patch/ Inhaler/NS/Zyban/ Chantix\par  \par    RX: [ ]  ()\par  \par  -ARRANGE  Follow up if set a quit date (with permission)\par  \par  Quit Date: [ ]  Phone calls or visits:  [ ] Week 1-2  Months   [ ] 1   [ ] 3   [ ] 6   [ ] 12  \par  \par  -Yearly Reassessment    [ ] No Changes of Smoking [ ] Change of Smoking Habit (Non-Smoker to Smoker/ Smoker to Non Smoker)\par  \par  (If there is change from Non Smoker to Smoker, please fill out new BRIEF TOBACCO CESSATION INTERVENTION FORM)\par  \par  Date of Yearly Reassessment : [ ]\par  \par  Comments:  [ ]\par  \par   \par  \par

## 2023-08-18 NOTE — PHYSICAL EXAM
[Intermittent] : intermittent [Cooperative] : cooperative [Depressed] : depressed [Anxious] : anxious [Constricted] : constricted [Clear] : clear [Linear/Goal Directed] : linear/goal directed [None] : none [Preoccupations/Ruminations] : preoccupations/ruminations [Depressive] : depressive [Self-Deprecatory] : self-deprecatory [None Reported] : none reported [WNL] : within normal limits [Average] : average [Moderate] : moderate [FreeTextEntry1] : Intermittent eye contact [FreeTextEntry5] : superficially [FreeTextEntry8] : very anxious and depressed [de-identified] : impaired

## 2023-08-18 NOTE — REASON FOR VISIT
[Other:___] : [unfilled] [[facility] Provider/Facility] : [facility] Provider/Facility [Patient] : Patient [FreeTextEntry2] : SI, anxiety [FreeTextEntry1] : SI, anxiety
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
## 2023-08-19 DIAGNOSIS — F33.1 MAJOR DEPRESSIVE DISORDER, RECURRENT, MODERATE: ICD-10-CM

## 2023-08-21 ENCOUNTER — APPOINTMENT (OUTPATIENT)
Dept: PSYCHIATRY | Facility: CLINIC | Age: 28
End: 2023-08-21

## 2023-08-21 ENCOUNTER — OUTPATIENT (OUTPATIENT)
Dept: OUTPATIENT SERVICES | Facility: HOSPITAL | Age: 28
LOS: 1 days | End: 2023-08-21
Payer: COMMERCIAL

## 2023-08-21 DIAGNOSIS — F33.1 MAJOR DEPRESSIVE DISORDER, RECURRENT, MODERATE: ICD-10-CM

## 2023-08-21 PROCEDURE — 90837 PSYTX W PT 60 MINUTES: CPT

## 2023-08-22 DIAGNOSIS — F33.1 MAJOR DEPRESSIVE DISORDER, RECURRENT, MODERATE: ICD-10-CM

## 2023-08-22 NOTE — PLAN
[Motivational Interviewing] : Motivational Interviewing  [Psychodynamic Therapy] : Psychodynamic Therapy  [Psychoeducation] : Psychoeducation  [Skills training (all types)] : Skills training (all types)  [Supportive Therapy] : Supportive Therapy [Recommended Frequency of Visits: ____] : Recommended frequency of visits: [unfilled] [Return in ____ week(s)] : Return in [unfilled] week(s) [FreeTextEntry2] : the treatment plan will be developed with the patient during the upcoming session.  [de-identified] : The therapist met the patient in person for the session. The patient reported that her week was ok, "my anxiety wasn't terrible". The patient also stated that the psychiatrist changed her psych meds, and the patient will pick it up today. Also, the patient reported some pain in her wrist and was prescribed wrist brace by the rheumatologist. In addition, the patient's blood work was within the normal range.  The patient stated that she didn't to her "homework" where the patient had to log her triggers/situation, initial reaction, opposing evidence, alternative thought and outcome/learning. The patient indicated that she is not interested to do it since she doenst see the point. The therapsit educated the patient that the thoughts about the situation effect the behaviour and by changing the thoughts might help the patient to feel better. The therapist also reviewed with the patient some of the coping skills and helped the patient to process her emotions.   [FreeTextEntry1] : The therapist will meet the patient weekly

## 2023-08-22 NOTE — PLAN
[FreeTextEntry2] : the treatment plan will be developed with the patient during the upcoming session.  [Motivational Interviewing] : Motivational Interviewing  [Psychodynamic Therapy] : Psychodynamic Therapy  [Psychoeducation] : Psychoeducation  [Skills training (all types)] : Skills training (all types)  [Supportive Therapy] : Supportive Therapy [de-identified] : The therapist met the patient in person for the session. The patient reported that she continues to have passive SI, no plan to act on her thoughts. The patient starts her semester on , and she registered for 4 classes- 14 credits. The patient was able to identify some of the triggers: the work authorization about to , the pain the patient has in her body including hands, sees rheumatologist, just a few topics that trigger the patient. During the session the patient stated, "I am tired of trying". The therapist helped the patient to process her emotions and acknowledged the fact that the pain the patient experiences makes it harder for the patient. The patient sounds depressed and unmotivated to do anything. The therapist and the patient spoke about coping skills and the patient stated, "it doesn't work". In addition, the therapist and the patient spoke about the patient's future and possible treatment plan goals. The patient takes meds as prescribed. Throughout the session the patient was provided with active listening, motivational interviewing, emotional support and empathy  [Recommended Frequency of Visits: ____] : Recommended frequency of visits: [unfilled] [Return in ____ week(s)] : Return in [unfilled] week(s) [FreeTextEntry1] : The therapist will meet the patient weekly

## 2023-08-22 NOTE — REASON FOR VISIT
[Medical Office: ([facility])___] : at the medical office located in  [FreeTextEntry4] : 545 pm [FreeTextEntry5] : 630pm [Patient] : Patient [FreeTextEntry1] : therapy f/u

## 2023-08-23 ENCOUNTER — APPOINTMENT (OUTPATIENT)
Dept: PSYCHIATRY | Facility: CLINIC | Age: 28
End: 2023-08-23
Payer: COMMERCIAL

## 2023-08-23 ENCOUNTER — OUTPATIENT (OUTPATIENT)
Dept: OUTPATIENT SERVICES | Facility: HOSPITAL | Age: 28
LOS: 1 days | End: 2023-08-23
Payer: COMMERCIAL

## 2023-08-23 DIAGNOSIS — F33.1 MAJOR DEPRESSIVE DISORDER, RECURRENT, MODERATE: ICD-10-CM

## 2023-08-23 DIAGNOSIS — F31.81 BIPOLAR II DISORDER: ICD-10-CM

## 2023-08-23 PROCEDURE — 99215 OFFICE O/P EST HI 40 MIN: CPT | Mod: 95

## 2023-08-23 NOTE — PHYSICAL EXAM
[Intermittent] : intermittent [Cooperative] : cooperative [Depressed] : depressed [Anxious] : anxious [Constricted] : constricted [Clear] : clear [Linear/Goal Directed] : linear/goal directed [None] : none [Preoccupations/Ruminations] : preoccupations/ruminations [Depressive] : depressive [Self-Deprecatory] : self-deprecatory [None Reported] : none reported [WNL] : within normal limits [Average] : average [Moderate] : moderate [FreeTextEntry1] : Intermittent eye contact [FreeTextEntry5] : superficially [FreeTextEntry8] :  anxious and depressed [de-identified] : impaired
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
## 2023-08-23 NOTE — REASON FOR VISIT
[Other:___] : [unfilled] [[facility] Provider/Facility] : [facility] Provider/Facility [Patient] : Patient [FreeTextEntry2] : SI, anxiety [FreeTextEntry1] : SI, anxiety

## 2023-08-23 NOTE — RISK ASSESSMENT
[No, patient denies ideation or behavior] : No, patient denies ideation or behavior [Yes] : Yes [In last 30 days] : in the last 30 days [No] : No [(4) Daily or almost daily] : Frequency: How many times have you had these thoughts? Daily or almost daily [(2) Less than 1 hour/some of the time] : Less than 1 hour/some of the time [(2) Can control thoughts with little difficulty] : Can control thoughts with little difficulty [(2) Deterrents probably stopped you] : Deterrents probably stopped you [Mood disorder] : mood disorder [PTSD] : PTSD [Cluster B Personality disorder/traits] : cluster B personality disorder/traits [Depressed mood/Anhedonia] : depressed mood/anhedonia [Impulsivity] : impulsivity [Severe anxiety, agitation or panic] : severe anxiety, agitation or panic [Recent inpatient discharge] : recent inpatient discharge [Chronic pain/other acute medical condition] : chronic pain or other acute medical condition [Perceived burden on family or others] : perceived burden on family or others [Supportive social network of family or friends] : supportive social network of family or friends [Responsibility to children, family, or others] : responsibility to children, family, or others [Other:______] : [unfilled] [Unable to Assess] : unable to assess [None Known] : none known [No known risk factors] : No known risk factors [Residential stability] : residential stability [Relationship stability] : relationship stability [Engagement in treatment] : engagement in treatment [FreeTextEntry8] : Pt is not in imminent risk of hurting self at this time. But her chronic risk is high due to emotional dysregulation, chronic passive SI, impulsivity, h/o SIB/SA Pt is instructed to call 911/suicide line/go to ER in case of an emergency. [TextBox_32] : If i have the thoughts i call someone, " my fiance said that if i would come home and you are dead i would be devastating "  [FreeTextEntry2] : " unable to answer"  [FreeTextEntry4] : " constant anxiety, i am kind of useless now because of my migraines"  [FreeTextEntry5] : My fiance and couple of friends"

## 2023-08-23 NOTE — SOCIAL HISTORY
[FreeTextEntry1] : Legal Status\par  \par  Does individual Served have a Legal Guardian, rep Payee or Conservatorship?\par  \par  [x ] No - not sure\par  \par  [ ] Yes - Details: [ ]\par  \par   \par  \par  Legal Involvement history\par  \par  Does the individual have a history of or current involvement with the legal system?\par  \par  [x ] No\par  \par  [ ] Yes - Details: [ ]\par  \par   \par  \par   Services\par  \par  Have you ever served in the ?\par  \par  [ x] No\par  \par  [ ] Yes - Details: [ ]\par  \par   \par  \par  Employment history [ ]\par  " work at [employer]"\par   \par  \par  Developmental history [ ]\par  \par   " n/a"\par  \par  Sexual hx/identity Sexual History/ Concern (include sexual orientation and other relevant information)  [ ]\par  "heterosexual"\par  \par  Race - ethnicity - culture information [ ]\par  \par   " the patient was born in [facility] and my parents are also born there" \par  \par   \par  \par  Social supports (friends, Volunteers, club, AA meeting, other meetings ) ? [ ]\par  \par   " boyfriend and couple of friends"\par  \par  Meaningful Activities: [ ]\par  \par   " lost vandana doing things, like to play vide games, \par  \par   \par  \par  Spiritual Assessment Tool - [employer]\par  \par  F. What is your amirah or belief? [ ]\par  \par  " not Yazidism at all"\par  Do you consider yourself spiritual or Yazidism? [ ]\par  " no for both"\par  Is there something you believe in that gives meaning to your life? [ ]\par  " i kind of want a career' \par  I: Is it important in your life? [ ]\par  " important in a since that it would have with the money" \par  What influence does it have on how you take care of yourself? [ ]\par  " it does,  need money" \par  How have your beliefs influenced your behavior during this illness? What role do your beliefs play in regaining your health? [ ]\par  " n/A"\par  C. Are you part of a spiritual or Yazidism community? [ ]\par  "n/a"\par  Is this of support to you and how? [ ]\par  \par  Is there a person or group of people you really love or who are really important to you? [ ]\par  " my fiance and my two friends "\par  H. We have been discussing your belief and supports. What else gives you internal support?[ ]\par   " I don?t really know, 3 people i car about"\par  What are your sources of hope, strength, comfort and peace? [ ]\par  " i live being alone, i feel best when i am 100 % a lone" \par  What do you hold on to during difficult times? [ ]\par  " my fiance"\par  what sustains you and keeps you going? [ ]\par  " my fiance and my friends"\par  A. How would you like me, your healthcare provider, to address these issues in your healthcare? [ ]\par  " i don?t think my situation will change, but i need meds, sometimes the anxiety is so bad i have a pain in my chest" \par   \par  \par  SMOKING CESSATION\par  \par  Do you Smoke?                              [ ] Yes [x ] No\par  I vape- " 3,000 puffs lasts me for a month, but i am not looking to quit" \par  \par  Do you want to quit? [ ] Yes [ x] No\par  \par  -ASK\par  \par   Number of cigatettes   [ ] cigars [ ]  pipe bowls [ ]  per day\par  \par   Number of ST cans/ pouches per week [ ]\par  \par   Number of years used [ ]\par  \par   How soon after you wake up do you use tobacco?  [ ] within 30 minutes      [ ] more than 30 minutes\par  \par   Previous quit attempts:  # of attempts [ ] longest quit period [ ] methods(s) used [ ]\par  \par  How long ago was last attempt to quit  [ ] years [ ] months\par  \par   Reasons for wanting to quit[ ]\par  \par  -ADVISE   about the oral benefits of quitting\par  \par  -ASSESS   willingness to make a quit attempt (Stage of Change)\par  \par     [ ] Precontemplation (Stop here & Reassess next visit) [ ] Contemplation [ ] Preparation  \par  \par  -ASSIST    (depending on stage of change)\par  \par   Self-Help Pamphlets & Materials\par  \par   List of local community group/individual quit programs and phone help lines\par  \par   Encourage a quit date (for those who are ready)\par  \par   Pharmacotherapy: Nicotine gum/ Lozenge/ Patch/ Inhaler/NS/Zyban/ Chantix\par  \par    RX: [ ]  ()\par  \par  -ARRANGE  Follow up if set a quit date (with permission)\par  \par  Quit Date: [ ]  Phone calls or visits:  [ ] Week 1-2  Months   [ ] 1   [ ] 3   [ ] 6   [ ] 12  \par  \par  -Yearly Reassessment    [ ] No Changes of Smoking [ ] Change of Smoking Habit (Non-Smoker to Smoker/ Smoker to Non Smoker)\par  \par  (If there is change from Non Smoker to Smoker, please fill out new BRIEF TOBACCO CESSATION INTERVENTION FORM)\par  \par  Date of Yearly Reassessment : [ ]\par  \par  Comments:  [ ]\par  \par   \par  \par
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
## 2023-08-24 DIAGNOSIS — F31.81 BIPOLAR II DISORDER: ICD-10-CM

## 2023-08-24 NOTE — DISCUSSION/SUMMARY
[Initial Plan] : Initial Plan [Cognitively intact] : cognitively intact [English fluency] : English fluency [FreeTextEntry2] : 8/21/24 [FreeTextEntry3] : 7/21/23 [FreeTextEntry8] : unwillingness to use coping skills stating " i tried it all nothing works" [Mental Health] : Mental Health [Initial] : Initial [every ___ months] : every [unfilled] months [every ___ weeks] : every [unfilled] weeks [Other rationale for transition/discharge:] : Other rationale for transition/discharge: [None - Reason others did not participate:] : None - Reason others did not participate:  [Yes] : Yes [Psychiatric Provider/Prescriber] : Psychiatric Provider/Prescriber [Therapist] : Therapist [Supervisor (if needed)] : Supervisor [FreeTextEntry1] : Bipolar 2, aspergers syndrom, personality disorder, panic disorder, insomnia due to mental condition. [FreeTextEntry4] : " i would like to work on my social skills and anxiety and emotions regulations." Jamilah will recognize and improve daily symptoms of her Bipolar 2 disorder, asperges syndrome, personality disorder, panic disorder and insomnia due to mental condition as evidenced by an improvement in scores on mental health scales reviewed in therapy sessions every 6 months  [de-identified] : Jamilah will use emotional regulations skills (paying attention to positive events, opposite action, check the facts and focus on task). Jamilah will improve her social skills (example: study w/classmates, say hello to classmates, go out more with her boyfriend)  [de-identified] : 8/21/24 [de-identified] : Jamilah will take daily meds as prescribed and meet with psychiatrist/psychotherapist as scheduled  [de-identified] : 8/21/24 [FreeTextEntry5] : The therapist will utilize CBT techniques, Psychoeducation, Motivational interviewing and Supportive therapy  [de-identified] : Dr. Harvey [de-identified] : Moshe Gann [de-identified] : When patient no longer requires individual psychotherapy and medication in order to perform at baseline, the patient will be discharged.    [de-identified] : not clinically necessary

## 2023-08-24 NOTE — PLAN
[FreeTextEntry2] :  Goal 1. Jamilah will recognize and improve daily symptoms of her Bipolar 2 disorder, asperges syndrome, personality disorder, panic disorder and insomnia due to mental condition as evidenced by an improvement in scores on mental health scales reviewed in therapy sessions every 6 months  Objective1: Jamilah will use emotional regulations skills (paying attention to positive events, opposite action, check the facts and focus on task).   Objective2: Jamilah will improve her social skills (example: study w/classmates, say hello to classmates, go out more with her boyfriend)  Objective 3: Jamilah will take daily meds as prescribed and meet with psychiatrist/psychotherapist as scheduled  [Motivational Interviewing] : Motivational Interviewing  [Psychodynamic Therapy] : Psychodynamic Therapy  [Psychoeducation] : Psychoeducation  [Skills training (all types)] : Skills training (all types)  [Supportive Therapy] : Supportive Therapy [de-identified] : The therapist met the patient in person for the session. The patient continues to be on the high-risk list due to daily passive SI and self-harm thoughts. The patient reported that she has no intentions/plan to act on her thoughts even though the patient reported that she wanted to cut herself yesterday and she didn't do it. The patient was unable to state the reason why she decided not to cut her wrist. The therapist checked with the patient if she had plans to self-harm and the patient stated "NO". The therapist helped the patient to process her emotions and provided lots of support during the session. The patient also spoke about her anxiety that she has all her life and that nothing makes her feel happy or excited. Sometimes the patient enjoys playing computer games with her friend but once again the patient wasn't sure if that helps her to cope with her "numbness, indifference, or not". The therapist reviewed coping skills with the patient, however the patient reported that none of the skills ever worked for the patient. Automatic thoughts, core beliefs, 4R's for self-regulation, socialization, emotional venting and support, just to name a few, discussed. The patient takes meds as prescribed. Throughout the session the patient was provided with active listening, motivational interviewing, emotional support and empathy   [Recommended Frequency of Visits: ____] : Recommended frequency of visits: [unfilled] [Return in ____ week(s)] : Return in [unfilled] week(s) [FreeTextEntry1] : The therapist will meet the patient weekly

## 2023-08-24 NOTE — ADDENDUM
[FreeTextEntry1] : The therapist met the patient in person for the session. The patient reported that she continues to have passive SI, no plan to act on her thoughts. The patient starts her semester on , and she registered for 4 classes- 14 credits. The patient was able to identify some of the triggers: the work authorization about to , the pain the patient has in her body including hands, sees rheumatologist, just a few topics that trigger the patient. During the session the patient stated, "I am tired of trying". The therapist helped the patient to process her emotions and acknowledged the fact that the pain the patient experiences makes it harder for the patient. The patient sounds depressed and unmotivated to do anything. The therapist and the patient spoke about coping skills and the patient stated, "it doesn't work". In addition, the therapist and the patient spoke about the patient's future and possible treatment plan goals. The patient takes meds as prescribed. Throughout the session the patient was provided with active listening, motivational interviewing, emotional support and empathy

## 2023-08-24 NOTE — REASON FOR VISIT
[Medical Office: ([facility])___] : at the medical office located in  [FreeTextEntry4] : 545 pm [FreeTextEntry5] : 630pm [Patient] : Patient [FreeTextEntry1] : therapy f/u

## 2023-09-01 ENCOUNTER — APPOINTMENT (OUTPATIENT)
Dept: PSYCHIATRY | Facility: CLINIC | Age: 28
End: 2023-09-01

## 2023-09-08 ENCOUNTER — APPOINTMENT (OUTPATIENT)
Dept: PSYCHIATRY | Facility: CLINIC | Age: 28
End: 2023-09-08

## 2023-09-08 ENCOUNTER — OUTPATIENT (OUTPATIENT)
Dept: OUTPATIENT SERVICES | Facility: HOSPITAL | Age: 28
LOS: 1 days | End: 2023-09-08
Payer: COMMERCIAL

## 2023-09-08 ENCOUNTER — NON-APPOINTMENT (OUTPATIENT)
Age: 28
End: 2023-09-08

## 2023-09-08 DIAGNOSIS — F41.1 GENERALIZED ANXIETY DISORDER: ICD-10-CM

## 2023-09-08 DIAGNOSIS — F31.81 BIPOLAR II DISORDER: ICD-10-CM

## 2023-09-08 PROCEDURE — 90853 GROUP PSYCHOTHERAPY: CPT | Mod: 95

## 2023-09-08 NOTE — REASON FOR VISIT
[Patient preference] : as per patient preference [Continuing, patient seen in-person within last 12 months] : Telehealth services are continuing as patient has been seen in-person within last 12 months. [Telehealth (audio & video) - Individual/Group] : This visit was provided via telehealth using real-time 2-way audio visual technology. [Medical Office: ([facility])___] : The provider was located at the medical office in [unfilled]. [Verbal consent obtained from patient/other participant(s)] : Verbal consent for telehealth/telephonic services obtained from patient/other participant(s) [FreeTextEntry4] : 12:15PM [FreeTextEntry5] : 1:15PM

## 2023-09-08 NOTE — DISCUSSION/SUMMARY
[FreeTextEntry2] : 8/21/24 [FreeTextEntry3] : 7/21/23 [FreeTextEntry8] : unwillingness to use coping skills stating " i tried it all nothing works" [FreeTextEntry1] : Bipolar 2, aspergers syndrom, personality disorder, panic disorder, insomnia due to mental condition. [de-identified] : Jamilah will use emotional regulations skills (paying attention to positive events, opposite action, check the facts and focus on task) at least once a day for the next 12 months. Jamilah will improve her social skills (example: study w/classmates, say hello to classmates, go out more with her boyfriend) at least once a week for the next 12 months. [FreeTextEntry4] : " i would like to work on my social skills and anxiety and emotions regulations." Jamilah will recognize and improve daily symptoms of her Bipolar 2 disorder, asperges syndrome, personality disorder, panic disorder and insomnia due to mental condition as evidenced by an improvement in scores on mental health scales reviewed in therapy sessions every 6 months  [de-identified] : 8/21/24 [de-identified] : Jamilah will take daily meds as prescribed and meet with psychiatrist/psychotherapist as scheduled  [de-identified] : 8/21/24 [FreeTextEntry5] : The therapist will utilize CBT techniques, Psychoeducation, Motivational interviewing and Supportive therapy  [de-identified] : Dr. Harvey [de-identified] : Moshe Gann [de-identified] : When patient no longer requires individual psychotherapy and medication in order to perform at baseline, the patient will be discharged.    [de-identified] : not clinically necessary

## 2023-09-08 NOTE — REASON FOR VISIT
[Medical Office: ([facility])___] : at the medical office located in  [Patient] : Patient [FreeTextEntry4] : 545 pm [FreeTextEntry5] : 630pm [FreeTextEntry1] : therapy f/u

## 2023-09-08 NOTE — DISCUSSION/SUMMARY
[Once a week] : once a week [60 minutes] : 60 minutes [de-identified] : Dialectical Behavioral Therapist Skills Training Group [FreeTextEntry8] : This was the initial session for the distress tolerance group, which will commence 1x per week for 8 weeks. This initial session began at 12:15PM and ended at 1:15PM. Going forward, sessions will commence at 12PM and end at 1PM. The group is rooted in the dialectical behavioral therapy skill module of distress tolerance. We went over group rules and norms, as well as expectations going forward. Members of the group were able to provide brief introductions, as well as what they hope to learn in the group. Both facilitators reinforced the effectiveness of distress tolerance, and how it can provide a strong foundation for emotional and behavioral wellness. The group members engaged in discussion about their definition of crisis, and what it means to tolerate. Overall, group members agreed to adhere to protocol, and they were receptive to interventions provided in the session.   [FreeTextEntry4] : Mariela presented to the group on time and adhered to group protocol, norms, and rules. She introduced herself to other members and stated her reasons for joining the group. Mariela engaged in the discussion surrounding crisis and tolerance, including her interpretations of these ideas. She was receptive to interventions.

## 2023-09-08 NOTE — PLAN
[Motivational Interviewing] : Motivational Interviewing  [Psychodynamic Therapy] : Psychodynamic Therapy  [Psychoeducation] : Psychoeducation  [Skills training (all types)] : Skills training (all types)  [Supportive Therapy] : Supportive Therapy [Recommended Frequency of Visits: ____] : Recommended frequency of visits: [unfilled] [Return in ____ week(s)] : Return in [unfilled] week(s) [FreeTextEntry2] : Goal 1. Jamilah will recognize and improve daily symptoms of her Bipolar 2 disorder, asperges syndrome, personality disorder, panic disorder and insomnia due to mental condition as evidenced by an improvement in scores on mental health scales reviewed in therapy sessions every 6 months  Objective1: Jamilah will use emotional regulations skills (paying attention to positive events, opposite action, check the facts and focus on task) at least once a day for the next 12 months  Objective2: Jamilah will improve her social skills (example: study w/classmates, say hello to classmates, go out more with her boyfriend) at least once a week for the next 12 months.   Objective 3: Jamilah will take daily meds as prescribed and meet with psychiatrist/psychotherapist as scheduled.  [de-identified] : The therapist met the patient in person for the session. The patient continues to be on the high-risk list due to daily passive SI and self-harm thoughts. The patient reported that she has no intentions/plan to act on her thoughts even though the patient reported that she wanted to cut herself yesterday and she didn't do it. The patient was unable to state the reason why she decided not to cut her wrist. The therapist checked with the patient if she had plans to self-harm and the patient stated "NO". The therapist helped the patient to process her emotions and provided lots of support during the session. The patient also spoke about her anxiety that she has all her life and that nothing makes her feel happy or excited. Sometimes the patient enjoys playing computer games with her friend but once again the patient wasn't sure if that helps her to cope with her "numbness, indifference, or not". The therapist reviewed coping skills with the patient, however the patient reported that none of the skills ever worked for the patient. Automatic thoughts, core beliefs, 4R's for self-regulation, socialization, emotional venting and support, just to name a few, discussed. The patient takes meds as prescribed. Throughout the session the patient was provided with active listening, motivational interviewing, emotional support and empathy   [FreeTextEntry1] : The therapist will meet the patient weekly

## 2023-09-09 DIAGNOSIS — F31.81 BIPOLAR II DISORDER: ICD-10-CM

## 2023-09-09 DIAGNOSIS — F41.1 GENERALIZED ANXIETY DISORDER: ICD-10-CM

## 2023-09-13 ENCOUNTER — OUTPATIENT (OUTPATIENT)
Dept: OUTPATIENT SERVICES | Facility: HOSPITAL | Age: 28
LOS: 1 days | End: 2023-09-13
Payer: COMMERCIAL

## 2023-09-13 ENCOUNTER — APPOINTMENT (OUTPATIENT)
Dept: PSYCHIATRY | Facility: CLINIC | Age: 28
End: 2023-09-13
Payer: COMMERCIAL

## 2023-09-13 DIAGNOSIS — F33.1 MAJOR DEPRESSIVE DISORDER, RECURRENT, MODERATE: ICD-10-CM

## 2023-09-13 PROCEDURE — 99214 OFFICE O/P EST MOD 30 MIN: CPT | Mod: 95

## 2023-09-14 ENCOUNTER — APPOINTMENT (OUTPATIENT)
Dept: PSYCHIATRY | Facility: CLINIC | Age: 28
End: 2023-09-14

## 2023-09-14 ENCOUNTER — OUTPATIENT (OUTPATIENT)
Dept: OUTPATIENT SERVICES | Facility: HOSPITAL | Age: 28
LOS: 1 days | End: 2023-09-14
Payer: COMMERCIAL

## 2023-09-14 DIAGNOSIS — F31.81 BIPOLAR II DISORDER: ICD-10-CM

## 2023-09-14 DIAGNOSIS — F33.1 MAJOR DEPRESSIVE DISORDER, RECURRENT, MODERATE: ICD-10-CM

## 2023-09-14 PROCEDURE — 90832 PSYTX W PT 30 MINUTES: CPT | Mod: 95

## 2023-09-15 ENCOUNTER — APPOINTMENT (OUTPATIENT)
Dept: PSYCHIATRY | Facility: CLINIC | Age: 28
End: 2023-09-15

## 2023-09-15 ENCOUNTER — OUTPATIENT (OUTPATIENT)
Dept: OUTPATIENT SERVICES | Facility: HOSPITAL | Age: 28
LOS: 1 days | End: 2023-09-15
Payer: COMMERCIAL

## 2023-09-15 DIAGNOSIS — F31.81 BIPOLAR II DISORDER: ICD-10-CM

## 2023-09-15 PROCEDURE — 90853 GROUP PSYCHOTHERAPY: CPT | Mod: 95

## 2023-09-16 DIAGNOSIS — F31.81 BIPOLAR II DISORDER: ICD-10-CM

## 2023-09-22 ENCOUNTER — APPOINTMENT (OUTPATIENT)
Dept: PSYCHIATRY | Facility: CLINIC | Age: 28
End: 2023-09-22

## 2023-09-22 ENCOUNTER — OUTPATIENT (OUTPATIENT)
Dept: OUTPATIENT SERVICES | Facility: HOSPITAL | Age: 28
LOS: 1 days | End: 2023-09-22
Payer: COMMERCIAL

## 2023-09-22 DIAGNOSIS — F41.1 GENERALIZED ANXIETY DISORDER: ICD-10-CM

## 2023-09-22 DIAGNOSIS — F31.81 BIPOLAR II DISORDER: ICD-10-CM

## 2023-09-22 PROCEDURE — 90853 GROUP PSYCHOTHERAPY: CPT | Mod: 95

## 2023-09-23 DIAGNOSIS — F41.1 GENERALIZED ANXIETY DISORDER: ICD-10-CM

## 2023-09-23 DIAGNOSIS — F31.81 BIPOLAR II DISORDER: ICD-10-CM

## 2023-09-27 ENCOUNTER — APPOINTMENT (OUTPATIENT)
Dept: PSYCHIATRY | Facility: CLINIC | Age: 28
End: 2023-09-27
Payer: COMMERCIAL

## 2023-09-27 ENCOUNTER — OUTPATIENT (OUTPATIENT)
Dept: OUTPATIENT SERVICES | Facility: HOSPITAL | Age: 28
LOS: 1 days | End: 2023-09-27
Payer: COMMERCIAL

## 2023-09-27 DIAGNOSIS — F33.1 MAJOR DEPRESSIVE DISORDER, RECURRENT, MODERATE: ICD-10-CM

## 2023-09-27 PROCEDURE — 99215 OFFICE O/P EST HI 40 MIN: CPT | Mod: 95

## 2023-09-27 PROCEDURE — 99205 OFFICE O/P NEW HI 60 MIN: CPT | Mod: 95

## 2023-09-28 DIAGNOSIS — F33.1 MAJOR DEPRESSIVE DISORDER, RECURRENT, MODERATE: ICD-10-CM

## 2023-09-29 ENCOUNTER — APPOINTMENT (OUTPATIENT)
Dept: PSYCHIATRY | Facility: CLINIC | Age: 28
End: 2023-09-29

## 2023-10-04 ENCOUNTER — APPOINTMENT (OUTPATIENT)
Dept: PSYCHIATRY | Facility: CLINIC | Age: 28
End: 2023-10-04
Payer: COMMERCIAL

## 2023-10-04 ENCOUNTER — OUTPATIENT (OUTPATIENT)
Dept: OUTPATIENT SERVICES | Facility: HOSPITAL | Age: 28
LOS: 1 days | End: 2023-10-04
Payer: COMMERCIAL

## 2023-10-04 DIAGNOSIS — F33.2 MAJOR DEPRESSIVE DISORDER, RECURRENT SEVERE WITHOUT PSYCHOTIC FEATURES: ICD-10-CM

## 2023-10-04 DIAGNOSIS — F41.0 PANIC DISORDER [EPISODIC PAROXYSMAL ANXIETY]: ICD-10-CM

## 2023-10-04 DIAGNOSIS — F60.9 PERSONALITY DISORDER, UNSPECIFIED: ICD-10-CM

## 2023-10-04 PROCEDURE — 99215 OFFICE O/P EST HI 40 MIN: CPT

## 2023-10-04 PROCEDURE — 99214 OFFICE O/P EST MOD 30 MIN: CPT

## 2023-10-05 ENCOUNTER — OUTPATIENT (OUTPATIENT)
Dept: OUTPATIENT SERVICES | Facility: HOSPITAL | Age: 28
LOS: 1 days | End: 2023-10-05
Payer: COMMERCIAL

## 2023-10-05 ENCOUNTER — APPOINTMENT (OUTPATIENT)
Dept: PSYCHIATRY | Facility: CLINIC | Age: 28
End: 2023-10-05

## 2023-10-05 DIAGNOSIS — F41.0 PANIC DISORDER [EPISODIC PAROXYSMAL ANXIETY]: ICD-10-CM

## 2023-10-05 DIAGNOSIS — F60.9 PERSONALITY DISORDER, UNSPECIFIED: ICD-10-CM

## 2023-10-05 DIAGNOSIS — F33.2 MAJOR DEPRESSIVE DISORDER, RECURRENT SEVERE WITHOUT PSYCHOTIC FEATURES: ICD-10-CM

## 2023-10-05 DIAGNOSIS — F41.1 GENERALIZED ANXIETY DISORDER: ICD-10-CM

## 2023-10-05 PROCEDURE — 90837 PSYTX W PT 60 MINUTES: CPT

## 2023-10-06 ENCOUNTER — OUTPATIENT (OUTPATIENT)
Dept: OUTPATIENT SERVICES | Facility: HOSPITAL | Age: 28
LOS: 1 days | End: 2023-10-06
Payer: COMMERCIAL

## 2023-10-06 ENCOUNTER — APPOINTMENT (OUTPATIENT)
Dept: PSYCHIATRY | Facility: CLINIC | Age: 28
End: 2023-10-06

## 2023-10-06 DIAGNOSIS — F41.1 GENERALIZED ANXIETY DISORDER: ICD-10-CM

## 2023-10-06 DIAGNOSIS — F31.81 BIPOLAR II DISORDER: ICD-10-CM

## 2023-10-06 PROCEDURE — 90853 GROUP PSYCHOTHERAPY: CPT | Mod: 95

## 2023-10-07 DIAGNOSIS — F41.1 GENERALIZED ANXIETY DISORDER: ICD-10-CM

## 2023-10-07 DIAGNOSIS — F31.81 BIPOLAR II DISORDER: ICD-10-CM

## 2023-10-09 NOTE — PHYSICAL EXAM
Patient left VM to ask about the flu and Covid shots. Writer called and informed patient of going to the pharmacy would be the best route, as FP does not have the Covid vaccine yet.    [Intermittent] : intermittent [Cooperative] : cooperative [Depressed] : depressed [Anxious] : anxious [Constricted] : constricted [Clear] : clear [Linear/Goal Directed] : linear/goal directed [None] : none [Preoccupations/Ruminations] : preoccupations/ruminations [Depressive] : depressive [Self-Deprecatory] : self-deprecatory [None Reported] : none reported [WNL] : within normal limits [Average] : average [Moderate] : moderate [FreeTextEntry1] : Intermittent eye contact [FreeTextEntry5] : superficially [FreeTextEntry8] : very anxious and depressed [de-identified] : impaired

## 2023-10-11 ENCOUNTER — OUTPATIENT (OUTPATIENT)
Dept: OUTPATIENT SERVICES | Facility: HOSPITAL | Age: 28
LOS: 1 days | End: 2023-10-11
Payer: COMMERCIAL

## 2023-10-11 ENCOUNTER — APPOINTMENT (OUTPATIENT)
Dept: PSYCHIATRY | Facility: CLINIC | Age: 28
End: 2023-10-11
Payer: COMMERCIAL

## 2023-10-11 DIAGNOSIS — F41.0 PANIC DISORDER [EPISODIC PAROXYSMAL ANXIETY]: ICD-10-CM

## 2023-10-11 DIAGNOSIS — F31.81 BIPOLAR II DISORDER: ICD-10-CM

## 2023-10-11 DIAGNOSIS — F60.9 PERSONALITY DISORDER, UNSPECIFIED: ICD-10-CM

## 2023-10-11 PROCEDURE — 99214 OFFICE O/P EST MOD 30 MIN: CPT

## 2023-10-11 RX ORDER — BUPROPION HYDROCHLORIDE 300 MG/1
300 TABLET, EXTENDED RELEASE ORAL
Qty: 30 | Refills: 2 | Status: DISCONTINUED | COMMUNITY
Start: 2023-07-21 | End: 2023-10-11

## 2023-10-12 ENCOUNTER — APPOINTMENT (OUTPATIENT)
Dept: PSYCHIATRY | Facility: CLINIC | Age: 28
End: 2023-10-12

## 2023-10-12 ENCOUNTER — OUTPATIENT (OUTPATIENT)
Dept: OUTPATIENT SERVICES | Facility: HOSPITAL | Age: 28
LOS: 1 days | End: 2023-10-12
Payer: COMMERCIAL

## 2023-10-12 DIAGNOSIS — F31.81 BIPOLAR II DISORDER: ICD-10-CM

## 2023-10-12 DIAGNOSIS — F41.0 PANIC DISORDER [EPISODIC PAROXYSMAL ANXIETY]: ICD-10-CM

## 2023-10-12 DIAGNOSIS — F41.1 GENERALIZED ANXIETY DISORDER: ICD-10-CM

## 2023-10-12 DIAGNOSIS — F60.9 PERSONALITY DISORDER, UNSPECIFIED: ICD-10-CM

## 2023-10-12 PROCEDURE — 90834 PSYTX W PT 45 MINUTES: CPT

## 2023-10-13 ENCOUNTER — OUTPATIENT (OUTPATIENT)
Dept: OUTPATIENT SERVICES | Facility: HOSPITAL | Age: 28
LOS: 1 days | End: 2023-10-13
Payer: COMMERCIAL

## 2023-10-13 ENCOUNTER — APPOINTMENT (OUTPATIENT)
Dept: PSYCHIATRY | Facility: CLINIC | Age: 28
End: 2023-10-13

## 2023-10-13 DIAGNOSIS — F31.81 BIPOLAR II DISORDER: ICD-10-CM

## 2023-10-13 DIAGNOSIS — F41.1 GENERALIZED ANXIETY DISORDER: ICD-10-CM

## 2023-10-13 PROCEDURE — 90853 GROUP PSYCHOTHERAPY: CPT | Mod: 95

## 2023-10-14 DIAGNOSIS — F31.81 BIPOLAR II DISORDER: ICD-10-CM

## 2023-10-19 ENCOUNTER — RX RENEWAL (OUTPATIENT)
Age: 28
End: 2023-10-19

## 2023-10-19 ENCOUNTER — OUTPATIENT (OUTPATIENT)
Dept: OUTPATIENT SERVICES | Facility: HOSPITAL | Age: 28
LOS: 1 days | End: 2023-10-19
Payer: COMMERCIAL

## 2023-10-19 ENCOUNTER — APPOINTMENT (OUTPATIENT)
Dept: PSYCHIATRY | Facility: CLINIC | Age: 28
End: 2023-10-19

## 2023-10-19 DIAGNOSIS — F60.9 PERSONALITY DISORDER, UNSPECIFIED: ICD-10-CM

## 2023-10-19 DIAGNOSIS — F31.81 BIPOLAR II DISORDER: ICD-10-CM

## 2023-10-19 PROCEDURE — 90837 PSYTX W PT 60 MINUTES: CPT | Mod: 95

## 2023-10-20 ENCOUNTER — APPOINTMENT (OUTPATIENT)
Dept: PSYCHIATRY | Facility: CLINIC | Age: 28
End: 2023-10-20

## 2023-10-20 ENCOUNTER — NON-APPOINTMENT (OUTPATIENT)
Age: 28
End: 2023-10-20

## 2023-10-20 DIAGNOSIS — F60.9 PERSONALITY DISORDER, UNSPECIFIED: ICD-10-CM

## 2023-10-20 DIAGNOSIS — F31.81 BIPOLAR II DISORDER: ICD-10-CM

## 2023-10-25 ENCOUNTER — APPOINTMENT (OUTPATIENT)
Dept: PSYCHIATRY | Facility: CLINIC | Age: 28
End: 2023-10-25
Payer: COMMERCIAL

## 2023-10-25 ENCOUNTER — OUTPATIENT (OUTPATIENT)
Dept: OUTPATIENT SERVICES | Facility: HOSPITAL | Age: 28
LOS: 1 days | End: 2023-10-25
Payer: COMMERCIAL

## 2023-10-25 DIAGNOSIS — F41.0 PANIC DISORDER [EPISODIC PAROXYSMAL ANXIETY]: ICD-10-CM

## 2023-10-25 DIAGNOSIS — F60.9 PERSONALITY DISORDER, UNSPECIFIED: ICD-10-CM

## 2023-10-25 PROCEDURE — 99214 OFFICE O/P EST MOD 30 MIN: CPT

## 2023-10-26 ENCOUNTER — APPOINTMENT (OUTPATIENT)
Dept: PSYCHIATRY | Facility: CLINIC | Age: 28
End: 2023-10-26

## 2023-10-26 ENCOUNTER — OUTPATIENT (OUTPATIENT)
Dept: OUTPATIENT SERVICES | Facility: HOSPITAL | Age: 28
LOS: 1 days | End: 2023-10-26
Payer: COMMERCIAL

## 2023-10-26 DIAGNOSIS — F41.1 GENERALIZED ANXIETY DISORDER: ICD-10-CM

## 2023-10-26 DIAGNOSIS — F60.9 PERSONALITY DISORDER, UNSPECIFIED: ICD-10-CM

## 2023-10-26 DIAGNOSIS — F41.0 PANIC DISORDER [EPISODIC PAROXYSMAL ANXIETY]: ICD-10-CM

## 2023-10-26 DIAGNOSIS — F31.81 BIPOLAR II DISORDER: ICD-10-CM

## 2023-10-26 PROCEDURE — 90834 PSYTX W PT 45 MINUTES: CPT

## 2023-10-27 ENCOUNTER — OUTPATIENT (OUTPATIENT)
Dept: OUTPATIENT SERVICES | Facility: HOSPITAL | Age: 28
LOS: 1 days | End: 2023-10-27
Payer: COMMERCIAL

## 2023-10-27 ENCOUNTER — APPOINTMENT (OUTPATIENT)
Dept: PSYCHIATRY | Facility: CLINIC | Age: 28
End: 2023-10-27

## 2023-10-27 DIAGNOSIS — F31.81 BIPOLAR II DISORDER: ICD-10-CM

## 2023-10-27 PROCEDURE — 90853 GROUP PSYCHOTHERAPY: CPT | Mod: 95

## 2023-10-28 DIAGNOSIS — F31.81 BIPOLAR II DISORDER: ICD-10-CM

## 2023-10-28 DIAGNOSIS — F41.1 GENERALIZED ANXIETY DISORDER: ICD-10-CM

## 2023-10-30 ENCOUNTER — OUTPATIENT (OUTPATIENT)
Dept: OUTPATIENT SERVICES | Facility: HOSPITAL | Age: 28
LOS: 1 days | End: 2023-10-30
Payer: COMMERCIAL

## 2023-10-30 ENCOUNTER — APPOINTMENT (OUTPATIENT)
Dept: PSYCHIATRY | Facility: CLINIC | Age: 28
End: 2023-10-30

## 2023-10-30 DIAGNOSIS — F31.81 BIPOLAR II DISORDER: ICD-10-CM

## 2023-10-30 DIAGNOSIS — F60.9 PERSONALITY DISORDER, UNSPECIFIED: ICD-10-CM

## 2023-10-30 PROCEDURE — 90834 PSYTX W PT 45 MINUTES: CPT | Mod: 95

## 2023-10-31 DIAGNOSIS — F31.81 BIPOLAR II DISORDER: ICD-10-CM

## 2023-10-31 DIAGNOSIS — F60.9 PERSONALITY DISORDER, UNSPECIFIED: ICD-10-CM

## 2023-11-08 ENCOUNTER — APPOINTMENT (OUTPATIENT)
Dept: PSYCHIATRY | Facility: CLINIC | Age: 28
End: 2023-11-08
Payer: SELF-PAY

## 2023-11-08 ENCOUNTER — OUTPATIENT (OUTPATIENT)
Dept: OUTPATIENT SERVICES | Facility: HOSPITAL | Age: 28
LOS: 1 days | End: 2023-11-08
Payer: COMMERCIAL

## 2023-11-08 ENCOUNTER — APPOINTMENT (OUTPATIENT)
Dept: PSYCHIATRY | Facility: CLINIC | Age: 28
End: 2023-11-08

## 2023-11-08 DIAGNOSIS — F41.0 PANIC DISORDER [EPISODIC PAROXYSMAL ANXIETY]: ICD-10-CM

## 2023-11-08 DIAGNOSIS — F84.5 ASPERGER'S SYNDROME: ICD-10-CM

## 2023-11-08 DIAGNOSIS — F51.05 INSOMNIA DUE TO OTHER MENTAL DISORDER: ICD-10-CM

## 2023-11-08 DIAGNOSIS — F33.1 MAJOR DEPRESSIVE DISORDER, RECURRENT, MODERATE: ICD-10-CM

## 2023-11-08 DIAGNOSIS — F98.8 OTHER SPECIFIED BEHAVIORAL AND EMOTIONAL DISORDERS WITH ONSET USUALLY OCCURRING IN CHILDHOOD AND ADOLESCENCE: ICD-10-CM

## 2023-11-08 PROCEDURE — 99214 OFFICE O/P EST MOD 30 MIN: CPT | Mod: 95

## 2023-11-08 RX ORDER — METHYLPHENIDATE HYDROCHLORIDE 10 MG/1
10 TABLET ORAL
Qty: 15 | Refills: 0 | Status: DISCONTINUED | COMMUNITY
Start: 2023-10-25 | End: 2023-11-08

## 2023-11-09 ENCOUNTER — APPOINTMENT (OUTPATIENT)
Dept: PSYCHIATRY | Facility: CLINIC | Age: 28
End: 2023-11-09

## 2023-11-09 DIAGNOSIS — F33.1 MAJOR DEPRESSIVE DISORDER, RECURRENT, MODERATE: ICD-10-CM

## 2023-11-28 ENCOUNTER — APPOINTMENT (OUTPATIENT)
Dept: PSYCHIATRY | Facility: CLINIC | Age: 28
End: 2023-11-28

## 2023-11-29 ENCOUNTER — APPOINTMENT (OUTPATIENT)
Dept: PSYCHIATRY | Facility: CLINIC | Age: 28
End: 2023-11-29

## 2023-11-29 ENCOUNTER — OUTPATIENT (OUTPATIENT)
Dept: OUTPATIENT SERVICES | Facility: HOSPITAL | Age: 28
LOS: 1 days | End: 2023-11-29

## 2023-11-29 DIAGNOSIS — F41.1 GENERALIZED ANXIETY DISORDER: ICD-10-CM

## 2023-11-29 DIAGNOSIS — F31.81 BIPOLAR II DISORDER: ICD-10-CM

## 2023-11-30 DIAGNOSIS — F31.81 BIPOLAR II DISORDER: ICD-10-CM

## 2023-11-30 DIAGNOSIS — F41.1 GENERALIZED ANXIETY DISORDER: ICD-10-CM

## 2023-12-05 ENCOUNTER — APPOINTMENT (OUTPATIENT)
Dept: PSYCHIATRY | Facility: CLINIC | Age: 28
End: 2023-12-05

## 2023-12-05 ENCOUNTER — OUTPATIENT (OUTPATIENT)
Dept: OUTPATIENT SERVICES | Facility: HOSPITAL | Age: 28
LOS: 1 days | End: 2023-12-05
Payer: COMMERCIAL

## 2023-12-05 DIAGNOSIS — F31.81 BIPOLAR II DISORDER: ICD-10-CM

## 2023-12-05 DIAGNOSIS — F41.1 GENERALIZED ANXIETY DISORDER: ICD-10-CM

## 2023-12-05 PROCEDURE — 90837 PSYTX W PT 60 MINUTES: CPT

## 2023-12-06 DIAGNOSIS — F41.1 GENERALIZED ANXIETY DISORDER: ICD-10-CM

## 2023-12-06 DIAGNOSIS — F31.81 BIPOLAR II DISORDER: ICD-10-CM

## 2023-12-07 ENCOUNTER — INPATIENT (INPATIENT)
Facility: HOSPITAL | Age: 28
LOS: 6 days | Discharge: HOME | End: 2023-12-14
Attending: PSYCHIATRY & NEUROLOGY | Admitting: PSYCHIATRY & NEUROLOGY
Payer: COMMERCIAL

## 2023-12-07 ENCOUNTER — OUTPATIENT (OUTPATIENT)
Dept: OUTPATIENT SERVICES | Facility: HOSPITAL | Age: 28
LOS: 1 days | End: 2023-12-07
Payer: COMMERCIAL

## 2023-12-07 ENCOUNTER — APPOINTMENT (OUTPATIENT)
Dept: PSYCHIATRY | Facility: CLINIC | Age: 28
End: 2023-12-07

## 2023-12-07 VITALS
OXYGEN SATURATION: 98 % | WEIGHT: 184.97 LBS | DIASTOLIC BLOOD PRESSURE: 109 MMHG | SYSTOLIC BLOOD PRESSURE: 159 MMHG | HEART RATE: 84 BPM | RESPIRATION RATE: 14 BRPM | TEMPERATURE: 98 F

## 2023-12-07 DIAGNOSIS — F33.2 MAJOR DEPRESSIVE DISORDER, RECURRENT SEVERE WITHOUT PSYCHOTIC FEATURES: ICD-10-CM

## 2023-12-07 DIAGNOSIS — F41.1 GENERALIZED ANXIETY DISORDER: ICD-10-CM

## 2023-12-07 DIAGNOSIS — F31.81 BIPOLAR II DISORDER: ICD-10-CM

## 2023-12-07 LAB
ANION GAP SERPL CALC-SCNC: 13 MMOL/L — SIGNIFICANT CHANGE UP (ref 7–14)
ANION GAP SERPL CALC-SCNC: 13 MMOL/L — SIGNIFICANT CHANGE UP (ref 7–14)
APAP SERPL-MCNC: <5 UG/ML — LOW (ref 10–30)
APAP SERPL-MCNC: <5 UG/ML — LOW (ref 10–30)
BASOPHILS # BLD AUTO: 0.04 K/UL — SIGNIFICANT CHANGE UP (ref 0–0.2)
BASOPHILS # BLD AUTO: 0.04 K/UL — SIGNIFICANT CHANGE UP (ref 0–0.2)
BASOPHILS NFR BLD AUTO: 0.5 % — SIGNIFICANT CHANGE UP (ref 0–1)
BASOPHILS NFR BLD AUTO: 0.5 % — SIGNIFICANT CHANGE UP (ref 0–1)
BUN SERPL-MCNC: 14 MG/DL — SIGNIFICANT CHANGE UP (ref 10–20)
BUN SERPL-MCNC: 14 MG/DL — SIGNIFICANT CHANGE UP (ref 10–20)
CALCIUM SERPL-MCNC: 9.5 MG/DL — SIGNIFICANT CHANGE UP (ref 8.4–10.5)
CALCIUM SERPL-MCNC: 9.5 MG/DL — SIGNIFICANT CHANGE UP (ref 8.4–10.5)
CHLORIDE SERPL-SCNC: 101 MMOL/L — SIGNIFICANT CHANGE UP (ref 98–110)
CHLORIDE SERPL-SCNC: 101 MMOL/L — SIGNIFICANT CHANGE UP (ref 98–110)
CO2 SERPL-SCNC: 22 MMOL/L — SIGNIFICANT CHANGE UP (ref 17–32)
CO2 SERPL-SCNC: 22 MMOL/L — SIGNIFICANT CHANGE UP (ref 17–32)
CREAT SERPL-MCNC: 0.6 MG/DL — LOW (ref 0.7–1.5)
CREAT SERPL-MCNC: 0.6 MG/DL — LOW (ref 0.7–1.5)
EGFR: 125 ML/MIN/1.73M2 — SIGNIFICANT CHANGE UP
EGFR: 125 ML/MIN/1.73M2 — SIGNIFICANT CHANGE UP
EOSINOPHIL # BLD AUTO: 0.11 K/UL — SIGNIFICANT CHANGE UP (ref 0–0.7)
EOSINOPHIL # BLD AUTO: 0.11 K/UL — SIGNIFICANT CHANGE UP (ref 0–0.7)
EOSINOPHIL NFR BLD AUTO: 1.3 % — SIGNIFICANT CHANGE UP (ref 0–8)
EOSINOPHIL NFR BLD AUTO: 1.3 % — SIGNIFICANT CHANGE UP (ref 0–8)
ETHANOL SERPL-MCNC: <10 MG/DL — SIGNIFICANT CHANGE UP
ETHANOL SERPL-MCNC: <10 MG/DL — SIGNIFICANT CHANGE UP
GLUCOSE SERPL-MCNC: 94 MG/DL — SIGNIFICANT CHANGE UP (ref 70–99)
GLUCOSE SERPL-MCNC: 94 MG/DL — SIGNIFICANT CHANGE UP (ref 70–99)
HCG SERPL QL: NEGATIVE — SIGNIFICANT CHANGE UP
HCG SERPL QL: NEGATIVE — SIGNIFICANT CHANGE UP
HCT VFR BLD CALC: 44.5 % — SIGNIFICANT CHANGE UP (ref 37–47)
HCT VFR BLD CALC: 44.5 % — SIGNIFICANT CHANGE UP (ref 37–47)
HGB BLD-MCNC: 15.3 G/DL — SIGNIFICANT CHANGE UP (ref 12–16)
HGB BLD-MCNC: 15.3 G/DL — SIGNIFICANT CHANGE UP (ref 12–16)
IMM GRANULOCYTES NFR BLD AUTO: 0.2 % — SIGNIFICANT CHANGE UP (ref 0.1–0.3)
IMM GRANULOCYTES NFR BLD AUTO: 0.2 % — SIGNIFICANT CHANGE UP (ref 0.1–0.3)
LYMPHOCYTES # BLD AUTO: 1.81 K/UL — SIGNIFICANT CHANGE UP (ref 1.2–3.4)
LYMPHOCYTES # BLD AUTO: 1.81 K/UL — SIGNIFICANT CHANGE UP (ref 1.2–3.4)
LYMPHOCYTES # BLD AUTO: 21.2 % — SIGNIFICANT CHANGE UP (ref 20.5–51.1)
LYMPHOCYTES # BLD AUTO: 21.2 % — SIGNIFICANT CHANGE UP (ref 20.5–51.1)
MCHC RBC-ENTMCNC: 29.6 PG — SIGNIFICANT CHANGE UP (ref 27–31)
MCHC RBC-ENTMCNC: 29.6 PG — SIGNIFICANT CHANGE UP (ref 27–31)
MCHC RBC-ENTMCNC: 34.4 G/DL — SIGNIFICANT CHANGE UP (ref 32–37)
MCHC RBC-ENTMCNC: 34.4 G/DL — SIGNIFICANT CHANGE UP (ref 32–37)
MCV RBC AUTO: 86.1 FL — SIGNIFICANT CHANGE UP (ref 81–99)
MCV RBC AUTO: 86.1 FL — SIGNIFICANT CHANGE UP (ref 81–99)
MONOCYTES # BLD AUTO: 0.71 K/UL — HIGH (ref 0.1–0.6)
MONOCYTES # BLD AUTO: 0.71 K/UL — HIGH (ref 0.1–0.6)
MONOCYTES NFR BLD AUTO: 8.3 % — SIGNIFICANT CHANGE UP (ref 1.7–9.3)
MONOCYTES NFR BLD AUTO: 8.3 % — SIGNIFICANT CHANGE UP (ref 1.7–9.3)
NEUTROPHILS # BLD AUTO: 5.84 K/UL — SIGNIFICANT CHANGE UP (ref 1.4–6.5)
NEUTROPHILS # BLD AUTO: 5.84 K/UL — SIGNIFICANT CHANGE UP (ref 1.4–6.5)
NEUTROPHILS NFR BLD AUTO: 68.5 % — SIGNIFICANT CHANGE UP (ref 42.2–75.2)
NEUTROPHILS NFR BLD AUTO: 68.5 % — SIGNIFICANT CHANGE UP (ref 42.2–75.2)
NRBC # BLD: 0 /100 WBCS — SIGNIFICANT CHANGE UP (ref 0–0)
NRBC # BLD: 0 /100 WBCS — SIGNIFICANT CHANGE UP (ref 0–0)
PLATELET # BLD AUTO: 260 K/UL — SIGNIFICANT CHANGE UP (ref 130–400)
PLATELET # BLD AUTO: 260 K/UL — SIGNIFICANT CHANGE UP (ref 130–400)
PMV BLD: 12 FL — HIGH (ref 7.4–10.4)
PMV BLD: 12 FL — HIGH (ref 7.4–10.4)
POTASSIUM SERPL-MCNC: 4.3 MMOL/L — SIGNIFICANT CHANGE UP (ref 3.5–5)
POTASSIUM SERPL-MCNC: 4.3 MMOL/L — SIGNIFICANT CHANGE UP (ref 3.5–5)
POTASSIUM SERPL-SCNC: 4.3 MMOL/L — SIGNIFICANT CHANGE UP (ref 3.5–5)
POTASSIUM SERPL-SCNC: 4.3 MMOL/L — SIGNIFICANT CHANGE UP (ref 3.5–5)
RBC # BLD: 5.17 M/UL — SIGNIFICANT CHANGE UP (ref 4.2–5.4)
RBC # BLD: 5.17 M/UL — SIGNIFICANT CHANGE UP (ref 4.2–5.4)
RBC # FLD: 12 % — SIGNIFICANT CHANGE UP (ref 11.5–14.5)
RBC # FLD: 12 % — SIGNIFICANT CHANGE UP (ref 11.5–14.5)
SALICYLATES SERPL-MCNC: 2.8 MG/DL — LOW (ref 4–30)
SALICYLATES SERPL-MCNC: 2.8 MG/DL — LOW (ref 4–30)
SARS-COV-2 RNA SPEC QL NAA+PROBE: SIGNIFICANT CHANGE UP
SARS-COV-2 RNA SPEC QL NAA+PROBE: SIGNIFICANT CHANGE UP
SODIUM SERPL-SCNC: 136 MMOL/L — SIGNIFICANT CHANGE UP (ref 135–146)
SODIUM SERPL-SCNC: 136 MMOL/L — SIGNIFICANT CHANGE UP (ref 135–146)
WBC # BLD: 8.53 K/UL — SIGNIFICANT CHANGE UP (ref 4.8–10.8)
WBC # BLD: 8.53 K/UL — SIGNIFICANT CHANGE UP (ref 4.8–10.8)
WBC # FLD AUTO: 8.53 K/UL — SIGNIFICANT CHANGE UP (ref 4.8–10.8)
WBC # FLD AUTO: 8.53 K/UL — SIGNIFICANT CHANGE UP (ref 4.8–10.8)

## 2023-12-07 PROCEDURE — 99285 EMERGENCY DEPT VISIT HI MDM: CPT

## 2023-12-07 PROCEDURE — 90853 GROUP PSYCHOTHERAPY: CPT

## 2023-12-07 RX ORDER — IBUPROFEN 200 MG
600 TABLET ORAL ONCE
Refills: 0 | Status: COMPLETED | OUTPATIENT
Start: 2023-12-07 | End: 2023-12-07

## 2023-12-07 RX ORDER — ACETAMINOPHEN 500 MG
650 TABLET ORAL ONCE
Refills: 0 | Status: COMPLETED | OUTPATIENT
Start: 2023-12-07 | End: 2023-12-07

## 2023-12-07 RX ADMIN — Medication 650 MILLIGRAM(S): at 19:28
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
## 2023-12-07 NOTE — ED BEHAVIORAL HEALTH ASSESSMENT NOTE - PAST PSYCHOTROPIC MEDICATION
Lexapro 10 mg for one month, stopped as medication made patient more anxious  Venlafaxine  Sertraline, reports no effects  Ritalin  Wellbutrin

## 2023-12-07 NOTE — ED BEHAVIORAL HEALTH ASSESSMENT NOTE - NS ED BHA PLAN ADMIT TO PSYCHIATRY BH CONTACT YN
He should  stay off the finasteride and increase his fluids, gargle with salt water to help his throat/neck.  Regarding his eyes if he has some artificial tears at home he should try using that.  It looks like finasteride or allergies might have aggravated his dry eyes and causing his symptoms.   No

## 2023-12-07 NOTE — BH PATIENT PROFILE - HOME MEDICATIONS
ARIPiprazole 2 mg oral tablet , 1 tab(s) orally once a day MDD: 2mg  hydrOXYzine hydrochloride 50 mg oral tablet , 1 tab(s) orally 2 times a day as needed for  anxiety MDD: 100mg  traZODone 50 mg oral tablet , 1 tab(s) orally once a day (at bedtime) MDD: 50mg  buPROPion 300 mg/24 hours (XL) oral tablet, extended release , 1 tab(s) orally once a day MDD: 300mg

## 2023-12-07 NOTE — ED PROVIDER NOTE - CARE PLAN
1 Principal Discharge DX:	Severe episode of recurrent major depressive disorder, without psychotic features

## 2023-12-07 NOTE — BH PATIENT PROFILE - NSBHSNSOFSAFETY_PSY_A_CORE
being left alone/calling significant other/family member/fresh air breaks/sitting quietly with someone/taking a nap/taking some deep breaths/talking to someone/using a calming object
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
## 2023-12-07 NOTE — ED ADULT NURSE NOTE - NSFALLUNIVINTERV_ED_ALL_ED
Bed/Stretcher in lowest position, wheels locked, appropriate side rails in place/Call bell, personal items and telephone in reach/Instruct patient to call for assistance before getting out of bed/chair/stretcher/Non-slip footwear applied when patient is off stretcher/[place] to call system/Physically safe environment - no spills, clutter or unnecessary equipment/Purposeful proactive rounding/Room/bathroom lighting operational, light cord in reach Bed/Stretcher in lowest position, wheels locked, appropriate side rails in place/Call bell, personal items and telephone in reach/Instruct patient to call for assistance before getting out of bed/chair/stretcher/Non-slip footwear applied when patient is off stretcher/[place] to call system/Physically safe environment - no spills, clutter or unnecessary equipment/Purposeful proactive rounding/Room/bathroom lighting operational, light cord in reach

## 2023-12-07 NOTE — ED BEHAVIORAL HEALTH ASSESSMENT NOTE - HPI (INCLUDE ILLNESS QUALITY, SEVERITY, DURATION, TIMING, CONTEXT, MODIFYING FACTORS, ASSOCIATED SIGNS AND SYMPTOMS)
Patient is a 28 year old female with a history of MDD, LAUREN, ADHD, Trauma, who presents to the ED after her therapist called EMS because the patient endorsed SI and worsening depression in therapy today.     Patient states that she "has always had depression and suicidal thoughts", but they have been getting worse over the last couple of months. That patient states that her SI is usually passive and she knows she won't act it, but it has gotten worse lately and she is afraid she might act on it. She has had thoughts of overdosing on pills and killing herself with a knife. She denies any preparatory actions. She states that this is the worst he SI has ever been. Patient endorses difficulty sleep, eating poorly, having low energy and low motivation, losing interest in things she used to enjoy, and struggling to maintain hygiene over the last 2 months. Patient was admitted to a psychiatric unit in July of this year for self harm/cutting. She has not cut herself since then, but she has been having increasingly stronger urges to self harm. She was admitted to [facility] 2 months ago when self harm urges got worse, she told her outpatient psychiatrist who then called EMS. Patient has been seeing a therapist weekly since she was discharged 2 months ago. She also attends a weekly DBT group with that therapist. She has an outpatient psychiatrist as well. She takes Cymbalta 60mh QAM and 30mg QHS, Abilify, Trazodone, and Adderall. She is unsure if she is taking any other psychiatric medications and is unsure of the doses. Patient is a 28 year old female with a history of MDD, LAUREN, ADHD, Trauma, who presents to the ED after her therapist called EMS because the patient endorsed SI and worsening depression in therapy today.     Patient states that she "has always had depression and suicidal thoughts", but they have been getting worse over the last couple of months. That patient states that her SI is usually passive and she knows she won't act it, but it has gotten worse lately and she is afraid she might act on it. She has had thoughts of overdosing on pills and killing herself with a knife. She denies any preparatory actions. She states that this is the worst he SI has ever been. Patient endorses difficulty sleep, eating poorly, having low energy and low motivation, losing interest in things she used to enjoy, and struggling to maintain hygiene over the last 2 months. Patient was admitted to a psychiatric unit in July of this year for self harm/cutting. She has not cut herself since then, but she has been having increasingly stronger urges to self harm. She was admitted to [facility] 2 months ago when self harm urges got worse, she told her outpatient psychiatrist who then called EMS. Patient has been seeing a therapist weekly since she was discharged 2 months ago. She also attends a weekly DBT group with that therapist. She has an outpatient psychiatrist as well. She takes Cymbalta 60mh QAM and 30mg QHS, Abilify, Trazodone, and Adderall. She is unsure if she is taking any other psychiatric medications and is unsure of the doses.

## 2023-12-07 NOTE — ED BEHAVIORAL HEALTH ASSESSMENT NOTE - SUMMARY
Patient is a 28 year old female with a history of MDD, LAUREN, ADHD, Trauma, who presents to the ED after her therapist called EMS because the patient endorsed SI and worsening depression in therapy today.     Patient's presentation is consistent with recurrent episode of MDD, with severe functional impairment and worsening SI. Patient is at high acute risk for harm to self given the following risk factors: trauma, hx NSSI, mood symptoms, limited social supports, inability to engage in work or school. Protective factors include: supportive , housing stability, engagement in outpatient treatment, willingness to seek help and treatment. Recommend inpatient psychiatric admission for worsening depression that interferes with the patient's ability to care for self, as well as safety in the setting of worsening SI. Patient agreeable to admission at this time.     Plan:  - Recommend inpatient admission once medically cleared   - Medications:    - Continue Cymbalta 60mg Qam + 30mg QHS    - Continue Trazodone 50mg QHS    - Continue Abilify 2.5mg QAM    - Hold Adderall   - 1:1 while in the ED      - A

## 2023-12-07 NOTE — ED BEHAVIORAL HEALTH ASSESSMENT NOTE - OTHER PAST PSYCHIATRIC HISTORY (INCLUDE DETAILS REGARDING ONSET, COURSE OF ILLNESS, INPATIENT/OUTPATIENT TREATMENT)
Jun 23-July 3 2023, Admitted to [facility] inpatient for depressed mood, SI, and NSSI.  Sept 2023 - admitted to [facility] for urges to self harm.     Has outpatient therapist that she sees individually and for group DBT - Kaitlynn, seeing for 2 months.   Has outpatient psychiatrist Dr. Oneal. Jun 23-July 3 2023, Admitted to [facility] inpatient for depressed mood, SI, and NSSI.  Sept 2023 - admitted to [facility] for urges to self harm.     Has outpatient therapist that she sees individually and for group DBT - Kaitlynn, seeing for 2 months.   Has outpatient psychiatrist Dr. Oneal.

## 2023-12-07 NOTE — ED BEHAVIORAL HEALTH ASSESSMENT NOTE - NSSUICPROTFACT_PSY_ALL_CORE
Responsibility to children, family, or others/Fear of death or the actual act of killing self/Positive therapeutic relationships/Beloved pets

## 2023-12-07 NOTE — ED PROVIDER NOTE - ATTENDING APP SHARED VISIT CONTRIBUTION OF CARE
Patient with prior history of depression generalized anxiety disorder who for the past week has had increased thoughts of suicidal ideation with a plan she been noncompliant with the medications over this time.  For the past 3 days because of her suicidal ideation.  Denies any ingestions.  Has had prior attempts of extremity cutting.  Went to her outpatient clinic who referred her here for evaluation. Exam shows heart and lung sounds are normal affect is depressed plan is to obtain labs and consult psych

## 2023-12-07 NOTE — ED BEHAVIORAL HEALTH ASSESSMENT NOTE - DESCRIPTION
Chronic Migraines, Chronic pain in wrists and hands (etiology unknown) -[facility] [employer] [facility] -[facility] [employer] [facility] see MSW note

## 2023-12-07 NOTE — BH PATIENT PROFILE - FALL HARM RISK - UNIVERSAL INTERVENTIONS
Bed in lowest position, wheels locked, appropriate side rails in place/Call bell, personal items and telephone in reach/Instruct patient to call for assistance before getting out of bed or chair/Non-slip footwear when patient is out of bed/[place] to call system/Physically safe environment - no spills, clutter or unnecessary equipment/Purposeful Proactive Rounding/Room/bathroom lighting operational, light cord in reach Bed in lowest position, wheels locked, appropriate side rails in place/Call bell, personal items and telephone in reach/Instruct patient to call for assistance before getting out of bed or chair/Non-slip footwear when patient is out of bed/[place] to call system/Physically safe environment - no spills, clutter or unnecessary equipment/Purposeful Proactive Rounding/Room/bathroom lighting operational, light cord in reach

## 2023-12-07 NOTE — ED ADULT TRIAGE NOTE - CHIEF COMPLAINT QUOTE
pt states " Im afraid I am going to hurt myself" reports having a plan. tearful in triage. denies HI/ visual/auditory hallucinations. 1:1 initiated in triage.

## 2023-12-07 NOTE — ED PROVIDER NOTE - OBJECTIVE STATEMENT
Patient with prior history of depression generalized anxiety disorder who for the past week has had increased thoughts of suicidal ideation with a plan she been noncompliant with the medications over this time.  For the past 3 days because of her suicidal ideation.  Denies any ingestions.  Has had prior attempts of extremity cutting.  Went to her outpatient clinic who referred her here for evaluation.

## 2023-12-07 NOTE — ED PROVIDER NOTE - CCCP TRG CHIEF CMPLNT
-- Message is from the [facility]--    Reason for Call: Patient calling to request Dr. Posadas get in contact with [facility] as he received a letter request the doctor get in contact with them. Patient will not be able to receive his refill of insulin until Dr. Posadas gets in contact with them.     Caller Information       Type Contact Phone    04/20/2019 08:32 AM Phone (Incoming) OswaldShivam (Self) [phone] (H)          Alternative phone number: N/a     Turnaround time given to caller:   \"This message will be sent to [state Provider's name]. The clinical team will fulfill your request as soon as they review your message.\"     psychiatric evaluation

## 2023-12-07 NOTE — ED BEHAVIORAL HEALTH ASSESSMENT NOTE - NSBHATTESTBILLING_PSY_A_CORE
[id number] diagnostic evaluation with medical services [id number] diagnostic evaluation with medical services

## 2023-12-07 NOTE — ED PROVIDER NOTE - PHYSICAL EXAMINATION
CONSTITUTIONAL: Well-developed; well-nourished; in no acute distress, nontoxic appearing  SKIN: skin exam is warm and dry,  HEAD: Normocephalic; atraumatic.  EYES: PERRL, 3 mm bilateral, no nystagmus, EOM intact; conjunctiva and sclera clear.  ENT: MMM, no nasal congestion  NECK: Supple; non tender.  ROM intact.  CARD: S1, S2 normal, no murmur  RESP: No wheezes, rales or rhonchi. Good air movement bilaterally  ABD: soft; non-distended; non-tender. No Rebound, No guarding  EXT: Normal ROM. No cyanosis or edema. Dp Pulses intact.   NEURO: awake, alert, following commands, oriented, grossly unremarkable. No Focal deficits. GCS 15.   PSYCH: Cooperative, sad and tearful at times +SI

## 2023-12-08 ENCOUNTER — NON-APPOINTMENT (OUTPATIENT)
Age: 28
End: 2023-12-08

## 2023-12-08 DIAGNOSIS — F41.0 PANIC DISORDER [EPISODIC PAROXYSMAL ANXIETY]: ICD-10-CM

## 2023-12-08 DIAGNOSIS — F31.81 BIPOLAR II DISORDER: ICD-10-CM

## 2023-12-08 DIAGNOSIS — F41.1 GENERALIZED ANXIETY DISORDER: ICD-10-CM

## 2023-12-08 PROCEDURE — 90792 PSYCH DIAG EVAL W/MED SRVCS: CPT

## 2023-12-08 PROCEDURE — 99221 1ST HOSP IP/OBS SF/LOW 40: CPT

## 2023-12-08 RX ORDER — TRAZODONE HCL 50 MG
50 TABLET ORAL ONCE
Refills: 0 | Status: DISCONTINUED | OUTPATIENT
Start: 2023-12-08 | End: 2023-12-08

## 2023-12-08 RX ORDER — ARIPIPRAZOLE 15 MG/1
2 TABLET ORAL DAILY
Refills: 0 | Status: DISCONTINUED | OUTPATIENT
Start: 2023-12-08 | End: 2023-12-08

## 2023-12-08 RX ORDER — FLUOXETINE HCL 10 MG
20 CAPSULE ORAL DAILY
Refills: 0 | Status: DISCONTINUED | OUTPATIENT
Start: 2023-12-09 | End: 2023-12-12

## 2023-12-08 RX ORDER — HALOPERIDOL DECANOATE 100 MG/ML
5 INJECTION INTRAMUSCULAR EVERY 6 HOURS
Refills: 0 | Status: DISCONTINUED | OUTPATIENT
Start: 2023-12-08 | End: 2023-12-14

## 2023-12-08 RX ORDER — DULOXETINE HYDROCHLORIDE 30 MG/1
60 CAPSULE, DELAYED RELEASE ORAL DAILY
Refills: 0 | Status: DISCONTINUED | OUTPATIENT
Start: 2023-12-08 | End: 2023-12-08

## 2023-12-08 RX ORDER — DULOXETINE HYDROCHLORIDE 30 MG/1
30 CAPSULE, DELAYED RELEASE ORAL AT BEDTIME
Refills: 0 | Status: DISCONTINUED | OUTPATIENT
Start: 2023-12-08 | End: 2023-12-08

## 2023-12-08 RX ORDER — ACETAMINOPHEN 500 MG
650 TABLET ORAL EVERY 6 HOURS
Refills: 0 | Status: DISCONTINUED | OUTPATIENT
Start: 2023-12-08 | End: 2023-12-14

## 2023-12-08 RX ORDER — CLONAZEPAM 1 MG
0.5 TABLET ORAL AT BEDTIME
Refills: 0 | Status: DISCONTINUED | OUTPATIENT
Start: 2023-12-08 | End: 2023-12-11

## 2023-12-08 RX ORDER — LITHIUM CARBONATE 300 MG/1
300 TABLET, EXTENDED RELEASE ORAL
Refills: 0 | Status: DISCONTINUED | OUTPATIENT
Start: 2023-12-08 | End: 2023-12-11

## 2023-12-08 RX ORDER — HYDROXYZINE HCL 10 MG
50 TABLET ORAL EVERY 6 HOURS
Refills: 0 | Status: DISCONTINUED | OUTPATIENT
Start: 2023-12-08 | End: 2023-12-14

## 2023-12-08 RX ORDER — DIPHENHYDRAMINE HCL 50 MG
50 CAPSULE ORAL EVERY 6 HOURS
Refills: 0 | Status: DISCONTINUED | OUTPATIENT
Start: 2023-12-08 | End: 2023-12-14

## 2023-12-08 RX ORDER — HYDROXYZINE HCL 10 MG
25 TABLET ORAL THREE TIMES A DAY
Refills: 0 | Status: DISCONTINUED | OUTPATIENT
Start: 2023-12-08 | End: 2023-12-08

## 2023-12-08 RX ADMIN — Medication 650 MILLIGRAM(S): at 18:48

## 2023-12-08 RX ADMIN — LITHIUM CARBONATE 300 MILLIGRAM(S): 300 TABLET, EXTENDED RELEASE ORAL at 19:56

## 2023-12-08 RX ADMIN — Medication 0.5 MILLIGRAM(S): at 19:57

## 2023-12-08 RX ADMIN — Medication 650 MILLIGRAM(S): at 19:17

## 2023-12-08 RX ADMIN — DULOXETINE HYDROCHLORIDE 60 MILLIGRAM(S): 30 CAPSULE, DELAYED RELEASE ORAL at 08:16

## 2023-12-08 RX ADMIN — ARIPIPRAZOLE 2 MILLIGRAM(S): 15 TABLET ORAL at 08:24

## 2023-12-08 NOTE — BH INPATIENT PSYCHIATRY ASSESSMENT NOTE - NSBHCHARTREVIEWLAB_PSY_A_CORE FT
12-07    136  |  101  |  14  ----------------------------<  94  4.3   |  22  |  0.6<L>    Ca    9.5      07 Dec 2023 15:48                          15.3   8.53  )-----------( 260      ( 07 Dec 2023 15:48 )             44.5

## 2023-12-08 NOTE — BH INPATIENT PSYCHIATRY ASSESSMENT NOTE - NSBHATTESTBILLING_PSY_A_CORE
[id number]-Zrfaswjgzim diagnostic evaluation with medical services [id number]-Jybwmlqovlz diagnostic evaluation with medical services

## 2023-12-08 NOTE — BH INPATIENT PSYCHIATRY ASSESSMENT NOTE - NSBHCHARTREVIEWINVESTIGATE_PSY_A_CORE FT
< from: 12 Lead ECG (12.07.23 @ 14:26) >    Ventricular Rate 72 BPM    Atrial Rate 72 BPM    P-R Interval 186 ms    QRS Duration 92 ms    Q-T Interval 394 ms    QTC Calculation(Bazett) 431 ms    P Axis 33 degrees    R Axis 47 degrees    T Axis 39 degrees    Diagnosis Line Sinus rhythm with marked sinus arrhythmia  Otherwise normal ECG    Confirmed by Hernandez Kc ([id number]) on 12/7/2023 2:53:45 PM    < end of copied text >

## 2023-12-08 NOTE — BH INPATIENT PSYCHIATRY ASSESSMENT NOTE - NSBHMETABOLIC_PSY_ALL_CORE_FT
BMI: BMI (kg/m2): 30.6 (12-08-23 @ 00:02)  HbA1c: A1C with Estimated Average Glucose Result: 5.6 % (06-24-23 @ 08:26)    Glucose:   BP: 135/89 (12-08-23 @ 09:55) (135/89 - 159/109)Vital Signs Last 24 Hrs  T(C): 36.3 (12-08-23 @ 09:55), Max: 36.7 (12-07-23 @ 13:06)  T(F): 97.3 (12-08-23 @ 09:55), Max: 98 (12-07-23 @ 13:06)  HR: 79 (12-08-23 @ 09:55) (68 - 84)  BP: 135/89 (12-08-23 @ 09:55) (135/89 - 159/109)  BP(mean): --  RR: 18 (12-08-23 @ 09:55) (14 - 18)  SpO2: 98% (12-07-23 @ 13:06) (98% - 98%)      Lipid Panel: Date/Time: 06-24-23 @ 08:26  Cholesterol, Serum: 196  LDL Cholesterol Calculated: 116  HDL Cholesterol, Serum: 34  Total Cholesterol/HDL Ration Measurement: --  Triglycerides, Serum: 228   BMI: BMI (kg/m2): 30.6 (12-08-23 @ 00:02)  HbA1c: A1C with Estimated Average Glucose Result: 5.6 % (06-24-23 @ 08:26)    Glucose:   BP: 135/89 (12-08-23 @ 09:55) (135/89 - 159/109)Vital Signs Last 24 Hrs  T(C): 36.3 (12-08-23 @ 09:55), Max: 36.3 (12-08-23 @ 09:55)  T(F): 97.3 (12-08-23 @ 09:55), Max: 97.3 (12-08-23 @ 09:55)  HR: 79 (12-08-23 @ 09:55) (68 - 79)  BP: 135/89 (12-08-23 @ 09:55) (135/89 - 137/92)  BP(mean): --  RR: 18 (12-08-23 @ 09:55) (16 - 18)  SpO2: --      Lipid Panel: Date/Time: 06-24-23 @ 08:26  Cholesterol, Serum: 196  LDL Cholesterol Calculated: 116  HDL Cholesterol, Serum: 34  Total Cholesterol/HDL Ration Measurement: --  Triglycerides, Serum: 228

## 2023-12-08 NOTE — BH INPATIENT PSYCHIATRY ASSESSMENT NOTE - RISK ASSESSMENT
Risk Factors (Modifiable): Chronic suicidal ideation    Risk Factors (Non-Modifiable): History of self-harm, prior IPP admissions, history of trauma    Protective Factors: Fair insight, help-seeking, supportive spouse, established outpatient care

## 2023-12-08 NOTE — CONSULT NOTE ADULT - SUBJECTIVE AND OBJECTIVE BOX
CONSULTATION NOTE    HPI: 28 year old female with a history of MDD, LAUREN, ADHD, Trauma, who presents to the ED after her therapist called EMS because the patient endorsed SI and worsening depression in therapy today.    Patient reports chronic pain in both wrist for the past 2 years. She had xrays in the past for both wrists which did not show any abnormality. Denies any other complaints, no chest pain, SOB, abd pain, n/v/d, fever, chills, urinary symptoms.      PAST MEDICAL & SURGICAL HISTORY:  Depression      Migraines      No significant past surgical history        Allergies:  No Known Allergies    Home Medications Reviewed  Hospital Medications:   MEDICATIONS  (STANDING):  clonazePAM  Tablet 0.5 milliGRAM(s) Oral at bedtime  lithium 300 milliGRAM(s) Oral two times a day      SOCIAL HISTORY:  Denies ETOH,Smoking,   FAMILY HISTORY:        REVIEW OF SYSTEMS:  All other review of systems is negative unless indicated above.    VITALS:  T(F): 97.3 (12-08-23 @ 09:55), Max: 97.3 (12-08-23 @ 09:55)  HR: 79 (12-08-23 @ 09:55)  BP: 135/89 (12-08-23 @ 09:55)  RR: 18 (12-08-23 @ 09:55)  SpO2: --    Height (cm): 162.6 (12-08 @ 00:02)  Weight (kg): 80.83 (12-08 @ 00:02)  BMI (kg/m2): 30.6 (12-08 @ 00:02)  BSA (m2): 1.86 (12-08 @ 00:02)    I&O's Detail        PHYSICAL EXAM:  Constitutional: NAD  HEENT: anicteric sclera, oropharynx clear, MMM  Respiratory: CTAB, no wheezes, rales or rhonchi  Cardiovascular: S1, S2, RRR  Gastrointestinal: BS+, soft, NT/ND  Extremities: No cyanosis or clubbing. No peripheral edema  MSK: no erythema, warmth or tenderness noted over both wrists.  Neurological: A/O x 3, no focal deficits  : No CVA tenderness. No lawson.   Skin: No rashes on visible areas      LABS:  12-07    136  |  101  |  14  ----------------------------<  94  4.3   |  22  |  0.6<L>    Ca    9.5      07 Dec 2023 15:48      Creatinine Trend: 0.6 <--                        15.3   8.53  )-----------( 260      ( 07 Dec 2023 15:48 )             44.5       Urine Studies:  Urinalysis Basic - ( 07 Dec 2023 15:48 )    Color:  / Appearance:  / SG:  / pH:   Gluc: 94 mg/dL / Ketone:   / Bili:  / Urobili:    Blood:  / Protein:  / Nitrite:    Leuk Esterase:  / RBC:  / WBC    Sq Epi:  / Non Sq Epi:  / Bacteria:       RADIOLOGY & ADDITIONAL STUDIES:

## 2023-12-08 NOTE — CONSULT NOTE ADULT - ASSESSMENT
28 year old female with a history of MDD, LAUREN, ADHD, Trauma, who presents to the ED after her therapist called EMS because the patient endorsed SI and worsening depression in therapy today.    # chronic bilateral wrist pain  - as per patient previous w/up unremarkable  - no obvious deformity on exam  - topical pain management PRN    # MDD / suicidal ideation  - management as per psych team      # Vitals + Routine labs wnl

## 2023-12-08 NOTE — BH INPATIENT PSYCHIATRY ASSESSMENT NOTE - OTHER PAST PSYCHIATRIC HISTORY (INCLUDE DETAILS REGARDING ONSET, COURSE OF ILLNESS, INPATIENT/OUTPATIENT TREATMENT)
Jun 23-July 3 2023, Admitted to [facility] inpatient for depressed mood, SI, and NSSI.  Sept 2023 - admitted to [facility] for urges to self harm.     Has outpatient therapist that she sees individually and for group DBT - Kaitlynn, seeing for 2 months.   Has outpatient psychiatrist Dr. Oneal. Jun 23-July 3 2023, Admitted to [facility] inpatient for depressed mood, SI, and NSSI.  Sept 2023 - admitted to [facility] for urges to self harm.     Has outpatient therapist that she sees individually and for group DBT - Kaitlynn, seeing for 2 months.   Has outpatient psychiatrist Dr. Oneal.
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
## 2023-12-08 NOTE — BH INPATIENT PSYCHIATRY ASSESSMENT NOTE - HPI (INCLUDE ILLNESS QUALITY, SEVERITY, DURATION, TIMING, CONTEXT, MODIFYING FACTORS, ASSOCIATED SIGNS AND SYMPTOMS)
28 year old female with PMH of migraines and PPH of MDD, LAUREN, ADHD, history of trauma, who was admitted after her therapist activated EMS after patient endorsed suicidal ideation. 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    Per ED note, patient states that she "has always had depression and suicidal thoughts", but they have been getting worse over the last couple of months. That patient states that her SI is usually passive and she knows she won't act it, but it has gotten worse lately and she is afraid she might act on it. She has had thoughts of overdosing on pills and killing herself with a knife. She denies any preparatory actions. She states that this is the worst he SI has ever been. Patient endorses difficulty sleep, eating poorly, having low energy and low motivation, losing interest in things she used to enjoy, and struggling to maintain hygiene over the last 2 months. Patient was admitted to a psychiatric unit in July of this year for self harm/cutting. She has not cut herself since then, but she has been having increasingly stronger urges to self harm. She was admitted to [facility] 2 months ago when self harm urges got worse, she told her outpatient psychiatrist who then called EMS. Patient has been seeing a therapist weekly since she was discharged 2 months ago. She also attends a weekly DBT group with that therapist.    On presentation, patient appeared bright and cooperative and smiled when speaking to interviewers, non-congruent to her reports that corroborated the above that she continues to have daily passive suicidal thoughts that remain difficult to control especially when she is more anxious. Patient reports that her depressive and anxious symptoms have been worse over the last week though there was no acute recent stressor. Patient reported that the passive suicidal thoughts have been present for years but the urge to self harm had been increasing recently, though she denies making any suicidal preparation or new episodes of self harm. Patient continues to endorse poor sleep, low energy, and a lack of engagement in her life. Patient also reports having panic attacks consistently usually about every two weeks. Patient endorses that the anxiety is the most acute concern currently and says she needs help with that as her current medications have not been effective in treating them. Patient is open to new medication regime. Patient denies any history of auditory hallucinations or homicidal ideation. 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    Per ED note, patient states that she "has always had depression and suicidal thoughts", but they have been getting worse over the last couple of months. That patient states that her SI is usually passive and she knows she won't act it, but it has gotten worse lately and she is afraid she might act on it. She has had thoughts of overdosing on pills and killing herself with a knife. She denies any preparatory actions. She states that this is the worst he SI has ever been. Patient endorses difficulty sleep, eating poorly, having low energy and low motivation, losing interest in things she used to enjoy, and struggling to maintain hygiene over the last 2 months. Patient was admitted to a psychiatric unit in July of this year for self harm/cutting. She has not cut herself since then, but she has been having increasingly stronger urges to self harm. She was admitted to [facility] 2 months ago when self harm urges got worse, she told her outpatient psychiatrist who then called EMS. Patient has been seeing a therapist weekly since she was discharged 2 months ago. She also attends a weekly DBT group with that therapist.    On presentation, patient appeared bright and cooperative and smiled when speaking to interviewers, non-congruent to her reports that corroborated the above that she continues to have daily passive suicidal thoughts that remain difficult to control especially when she is more anxious. Patient reports that her depressive and anxious symptoms have been worse over the last week though there was no acute recent stressor. Patient reported that the passive suicidal thoughts have been present for years but the urge to self harm had been increasing recently, though she denies making any suicidal preparation or new episodes of self harm. Patient continues to endorse poor sleep, low energy, and a lack of engagement in her life. Patient also reports having panic attacks consistently usually about every two weeks. Patient endorses that the anxiety is the most acute concern currently and says she needs help with that as her current medications have not been effective in treating them. Patient is open to new medication regime. Patient denies any history of auditory hallucinations or homicidal ideation.

## 2023-12-08 NOTE — BH TREATMENT PLAN - NSTXPLANTHERAPYSESSIONSFT_PSY_ALL_CORE
12-08-23  Type of therapy: Coping skills  Type of session: Group  Level of patient participation: Participated with encouragement, Resistance to participation  Duration of participation: 30 minutes  Therapy conducted by: Nursing  --    12-08-23  Type of therapy: Coping skills, Creative arts therapy, Inspiration and motiviation, Leisure development, Self esteem, Social skills training, Stress management, Symptom management  Type of session: Group  Level of patient participation: Resistance to participation  Duration of participation: Less than 15 minutes  Therapy conducted by: Psych rehab  Therapy Summary: Pt is attending select groups , during CM pt was unable to mention anything to be greatful for and stated she did not want to attend creative art because her hands hurt . Pt will need increased encouragement .

## 2023-12-08 NOTE — BH INPATIENT PSYCHIATRY ASSESSMENT NOTE - CURRENT MEDICATION
MEDICATIONS  (STANDING):  ARIPiprazole 2 milliGRAM(s) Oral daily  DULoxetine 30 milliGRAM(s) Oral at bedtime  DULoxetine 60 milliGRAM(s) Oral daily    MEDICATIONS  (PRN):  hydrOXYzine hydrochloride 25 milliGRAM(s) Oral three times a day PRN Anxiety  traZODone 50 milliGRAM(s) Oral Once PRN sleep   MEDICATIONS  (STANDING):  ARIPiprazole 2 milliGRAM(s) Oral daily  DULoxetine 60 milliGRAM(s) Oral daily  DULoxetine 30 milliGRAM(s) Oral at bedtime    MEDICATIONS  (PRN):  hydrOXYzine hydrochloride 25 milliGRAM(s) Oral three times a day PRN Anxiety  traZODone 50 milliGRAM(s) Oral Once PRN sleep   MEDICATIONS  (STANDING):  clonazePAM  Tablet 0.5 milliGRAM(s) Oral at bedtime  lithium 300 milliGRAM(s) Oral two times a day    MEDICATIONS  (PRN):  diphenhydrAMINE 50 milliGRAM(s) Oral every 6 hours PRN EPS Prevention  haloperidol     Tablet 5 milliGRAM(s) Oral every 6 hours PRN Agitation  hydrOXYzine hydrochloride 50 milliGRAM(s) Oral every 6 hours PRN Anxiety/Insomnia  LORazepam     Tablet 2 milliGRAM(s) Oral every 6 hours PRN Severe anxiety

## 2023-12-08 NOTE — BH INPATIENT PSYCHIATRY ASSESSMENT NOTE - DESCRIPTION
27 year old engaged female, previously employed at [employer], currently pursuing a Associates, possibly Bachelor's degree in Accounting from the [employer], currently on temporary medical leave due to migraines, domiciled in private apartment with estelle with estelle's mother and 14 year old brother. Born in [place], moved with Mother and brother to [place], then stayed in [place] for one, then moved to [place] two years ago. Patient is not in contact with brother in  or sister still in [place].  27 year old engaged female, previously employed at [employer], currently pursuing a Associates, possibly Bachelor's degree in Accounting from the [employer], currently on temporary medical leave due to migraines, domiciled in private apartment with estelle with estelle's mother and 14 year old brother. Born in [facility] [place] [facility], moved with Mother and brother to [place], then stayed in [place] for one, then moved to [place] two years ago. Patient is not in contact with brother in  or sister still in [place].

## 2023-12-08 NOTE — BH INPATIENT PSYCHIATRY ASSESSMENT NOTE - NSBHMSEBODY_PSY_A_CORE
Other (Free Text): Patient present today for mole removal consult on patient left temple. Patient reports lesion has been enlarging and darkening for the past three years. Patient reports a dermatologist has not seen this mole and his primary care referred him to plastic surgeon for removal. Dr. Madden examined patient and reviewed all patient medical history. Dr. Madden discussed surgical options with patient, including excision with sending lesion to pathology. Dr. Madden voiced he does not suspect lesion is cancerous and if it was suspicious he would send patient to dermatologist before excision. Patient reports he has had skin cancer before and this mole does not share the symptoms. Ebonie will contact patient for scheduling. Patient verbalized agreement and understanding of all risk, surgery, and healing factors. \\n\\nSize of lesion 10 mm by 10 mm Detail Level: Simple Average build

## 2023-12-08 NOTE — BH INPATIENT PSYCHIATRY ASSESSMENT NOTE - NSBHASSESSSUMMFT_PSY_ALL_CORE
28 year old  female, currently a Bachelor's student in Accounting at [employer], domciled with  in private residence, with PMH of migraines and PPH of MDD, LAUREN, ADHD, history of trauma, who was admitted after her therapist activated EMS after patient endorsed suicidal ideation. 28 year old  female, currently a Bachelor's student in Accounting at [employer], domciled with  in private residence, with PMH of migraines and PPH of MDD, LAUREN, ADHD, history of trauma, who was admitted after her therapist activated EMS after patient endorsed suicidal ideation. 28 year old  female, currently a Bachelor's student in Accounting at [employer], domciled with  in private residence, with PMH of migraines and PPH of MDD, LAUREN, ADHD, history of trauma, who was admitted after her therapist activated EMS after patient endorsed suicidal ideation.    Patient presents with major depressive episode with acute symptoms of anxiety including recent and recurrent panic attacks. Patients symptoms are acute on chronic given her year long history of passive suicidal ideation that recently escalated to an episode of self harm 6 months prior with no new actions of self harm since then though with the urges of self harm escalating over time. Patient reports significant symptoms over the last week with her unable to sleep, work, or generally engage in her life. Patient reports her anxiety as the most severe concern and also which makes it most difficult to cope with her chronic suicidal ideation. Patient also endorses that her current medication regime is not addressing her concerns and she is noticeably not functioning well at home or at her providers, which led to her therapist altering EMS. Patient is at chronic elevated risk of suicide and currently moderate acute risk, but patient would benefit from inpatient admission to readjust her medication regime and provide a safe place to try a new regime that will improve her anxiety and her ability to control her suicidal thoughts and urges.    Patient is agreeable to modify her medication regime, as she reports that they are not adequately addressing her anxiety, sleep, and suicidal ideation. Inpatient team recommends starting patient on Prozac with Lithium to address suicidal ideation with Klonopin to help with the transition until the standing medication starts to be effective. Patient was agreeable to this regime.    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    Patient presents with major depressive episode with acute symptoms of anxiety including recent and recurrent panic attacks. Patients symptoms are acute on chronic given her year long history of passive suicidal ideation that recently escalated to an episode of self harm 6 months prior with no new actions of self harm since then though with the urges of self harm escalating over time. Patient reports significant symptoms over the last week with her unable to sleep, work, or generally engage in her life. Patient reports her anxiety as the most severe concern, which also makes it difficult to cope with her chronic suicidal ideation. Patient also endorses that her current medication regime is not addressing her concerns and she is noticeably not functioning well at home or at her providers, which led to her therapist alerting EMS. Patient is at chronic elevated risk of suicide and currently moderate acute risk, but patient would benefit from inpatient admission to readjust her medication regime and provide a safe place to try a new regime that will improve her anxiety and her ability to control her suicidal thoughts and urges.    Patient is agreeable to modify her medication regime, as she reports that they are not adequately addressing her anxiety, sleep, and suicidal ideation. Inpatient team recommends starting patient on Prozac with Lithium to address suicidal ideation with Klonopin to help with the transition until the standing medication starts to be effective. Patient was agreeable to this regime.    #MDD, recurrent  #LAUREN with panic attacks  - Start Prozac 20 mg once daily starting 12/9  - Start Lithium  mg BID  - Start Klonopin PO 0.5 mg once daily at bedtime  - Hydroxyzine PO 50 mg PRN q6h for anxiety/ insomnia  - Discontinue home Cymbalta, Trazodone, and Abilify  - For acute agitation not amenable to verbal redirection give haldol 5mg po, Bendaryl 50mg, ativan 2 mg po q6 with escalation to IM if patient is danger to self/other; if IM formulation used please order repeat EKG to ensure qtc <500ms     28 year old  female, currently a Bachelor's student in Accounting at [employer], domciled with  in private residence, with PMH of migraines and PPH of MDD, LAUREN, ADHD, history of trauma, who was admitted after her therapist activated EMS after patient endorsed suicidal ideation.    Patient presents with major depressive episode with acute symptoms of anxiety including recent and recurrent panic attacks. Patients symptoms are acute on chronic given her year long history of passive suicidal ideation that recently escalated to an episode of self harm 6 months prior with no new actions of self harm since then though with the urges of self harm escalating over time. Patient reports significant symptoms over the last week with her unable to sleep, work, or generally engage in her life. Patient reports her anxiety as the most severe concern, which also makes it difficult to cope with her chronic suicidal ideation. Patient also endorses that her current medication regime is not addressing her concerns and she is noticeably not functioning well at home or at her providers, which led to her therapist alerting EMS. Patient is at chronic elevated risk of suicide and currently moderate acute risk, but patient would benefit from inpatient admission to readjust her medication regime and provide a safe place to try a new regime that will improve her anxiety and her ability to control her suicidal thoughts and urges.    Patient is agreeable to modify her medication regime, as she reports that they are not adequately addressing her anxiety, sleep, and suicidal ideation. Inpatient team recommends starting patient on Prozac with Lithium to address suicidal ideation with Klonopin to help with the transition until the standing medication starts to be effective. Patient was agreeable to this regime.    #MDD, recurrent  #LAUREN with panic attacks  - Start Prozac 20 mg once daily starting 12/9  - Start Lithium  mg BID  - Start Klonopin PO 0.5 mg once daily at bedtime  - Hydroxyzine PO 50 mg PRN q6h for anxiety/ insomnia  - Discontinue home Cymbalta, Trazodone, and Abilify  - For acute agitation not amenable to verbal redirection give haldol 5mg po, Bendaryl 50mg, ativan 2 mg po q6 with escalation to IM if patient is danger to self/other; if IM formulation used please order repeat EKG to ensure qtc <500ms

## 2023-12-08 NOTE — BH INPATIENT PSYCHIATRY ASSESSMENT NOTE - NSBHCHARTREVIEWVS_PSY_A_CORE FT
Vital Signs Last 24 Hrs  T(C): 36.3 (12-08-23 @ 09:55), Max: 36.7 (12-07-23 @ 13:06)  T(F): 97.3 (12-08-23 @ 09:55), Max: 98 (12-07-23 @ 13:06)  HR: 79 (12-08-23 @ 09:55) (68 - 84)  BP: 135/89 (12-08-23 @ 09:55) (135/89 - 159/109)  BP(mean): --  RR: 18 (12-08-23 @ 09:55) (14 - 18)  SpO2: 98% (12-07-23 @ 13:06) (98% - 98%)     Vital Signs Last 24 Hrs  T(C): 36.3 (12-08-23 @ 09:55), Max: 36.3 (12-08-23 @ 09:55)  T(F): 97.3 (12-08-23 @ 09:55), Max: 97.3 (12-08-23 @ 09:55)  HR: 79 (12-08-23 @ 09:55) (68 - 79)  BP: 135/89 (12-08-23 @ 09:55) (135/89 - 137/92)  BP(mean): --  RR: 18 (12-08-23 @ 09:55) (16 - 18)  SpO2: --

## 2023-12-08 NOTE — BH INPATIENT PSYCHIATRY ASSESSMENT NOTE - NSBHATTESTCOMMENTATTENDFT_PSY_A_CORE
Patient was seen, evaluated, and discussed with the Resident, Dr Hodges. Chart reviewed.   Agree with assessment and plan above. Continue with medications as above.

## 2023-12-09 PROCEDURE — 99232 SBSQ HOSP IP/OBS MODERATE 35: CPT | Mod: GC

## 2023-12-09 RX ORDER — FAMOTIDINE 10 MG/ML
20 INJECTION INTRAVENOUS DAILY
Refills: 0 | Status: DISCONTINUED | OUTPATIENT
Start: 2023-12-09 | End: 2023-12-14

## 2023-12-09 RX ADMIN — Medication 0.5 MILLIGRAM(S): at 20:13

## 2023-12-09 RX ADMIN — Medication 20 MILLIGRAM(S): at 08:50

## 2023-12-09 RX ADMIN — FAMOTIDINE 20 MILLIGRAM(S): 10 INJECTION INTRAVENOUS at 12:37

## 2023-12-09 RX ADMIN — LITHIUM CARBONATE 300 MILLIGRAM(S): 300 TABLET, EXTENDED RELEASE ORAL at 20:13

## 2023-12-09 RX ADMIN — LITHIUM CARBONATE 300 MILLIGRAM(S): 300 TABLET, EXTENDED RELEASE ORAL at 08:51

## 2023-12-09 NOTE — BH INPATIENT PSYCHIATRY PROGRESS NOTE - NSBHATTESTBILLING_PSY_A_CORE
81719-[id number] OBS or IP - moderate complexity OR 35-49 mins [id number] OBS or IP - moderate complexity OR 35-49 mins

## 2023-12-09 NOTE — BH INPATIENT PSYCHIATRY PROGRESS NOTE - NSBHMETABOLIC_PSY_ALL_CORE_FT
BMI: BMI (kg/m2): 30.6 (12-08-23 @ 00:02)  HbA1c: A1C with Estimated Average Glucose Result: 5.6 % (06-24-23 @ 08:26)    Glucose:   BP: 135/83 (12-09-23 @ 09:13) (124/78 - 159/109)Vital Signs Last 24 Hrs  T(C): 35.8 (12-09-23 @ 09:13), Max: 35.8 (12-09-23 @ 09:13)  T(F): 96.4 (12-09-23 @ 09:13), Max: 96.4 (12-09-23 @ 09:13)  HR: 78 (12-09-23 @ 09:13) (78 - 78)  BP: 135/83 (12-09-23 @ 09:13) (124/78 - 135/83)  BP(mean): --  RR: 18 (12-09-23 @ 09:13) (18 - 18)  SpO2: --      Lipid Panel: Date/Time: 06-24-23 @ 08:26  Cholesterol, Serum: 196  LDL Cholesterol Calculated: 116  HDL Cholesterol, Serum: 34  Total Cholesterol/HDL Ration Measurement: --  Triglycerides, Serum: 228

## 2023-12-09 NOTE — BH INPATIENT PSYCHIATRY PROGRESS NOTE - NSBHCHARTREVIEWVS_PSY_A_CORE FT
Vital Signs Last 24 Hrs  T(C): 35.8 (12-09-23 @ 09:13), Max: 35.8 (12-09-23 @ 09:13)  T(F): 96.4 (12-09-23 @ 09:13), Max: 96.4 (12-09-23 @ 09:13)  HR: 78 (12-09-23 @ 09:13) (78 - 78)  BP: 135/83 (12-09-23 @ 09:13) (124/78 - 135/83)  BP(mean): --  RR: 18 (12-09-23 @ 09:13) (18 - 18)  SpO2: --

## 2023-12-09 NOTE — BH INPATIENT PSYCHIATRY PROGRESS NOTE - NSBHASSESSSUMMFT_PSY_ALL_CORE
28 year old  female, currently a Bachelor's student in Accounting at [employer], domciled with  in private residence, with PMH of migraines and PPH of MDD, LAUREN, ADHD, history of trauma, who was admitted after her therapist activated EMS after patient endorsed suicidal ideation.    Patient presents with major depressive episode with acute symptoms of anxiety including recent and recurrent panic attacks. Patients symptoms are acute on chronic given her year long history of passive suicidal ideation that recently escalated to an episode of self harm 6 months prior with no new actions of self harm since then though with the urges of self harm escalating over time. Patient reports significant symptoms over the last week with her unable to sleep, work, or generally engage in her life. Patient reports her anxiety as the most severe concern, which also makes it difficult to cope with her chronic suicidal ideation. Patient also endorses that her current medication regime is not addressing her concerns and she is noticeably not functioning well at home or at her providers, which led to her therapist alerting EMS. Patient is at chronic elevated risk of suicide and currently moderate acute risk, but patient would benefit from inpatient admission to readjust her medication regime and provide a safe place to try a new regime that will improve her anxiety and her ability to control her suicidal thoughts and urges.    Patient is agreeable to modify her medication regime, as she reports that they are not adequately addressing her anxiety, sleep, and suicidal ideation. Inpatient team recommends starting patient on Prozac with Lithium to address suicidal ideation with Klonopin to help with the transition until the standing medication starts to be effective. Patient was agreeable to this regime.    12/9- pt reported nausea; potentially due to initiation of prozac. Famotidine administered; can try prozac on empty stomach tomorrow as she tried with food today.     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    Patient presents with major depressive episode with acute symptoms of anxiety including recent and recurrent panic attacks. Patients symptoms are acute on chronic given her year long history of passive suicidal ideation that recently escalated to an episode of self harm 6 months prior with no new actions of self harm since then though with the urges of self harm escalating over time. Patient reports significant symptoms over the last week with her unable to sleep, work, or generally engage in her life. Patient reports her anxiety as the most severe concern, which also makes it difficult to cope with her chronic suicidal ideation. Patient also endorses that her current medication regime is not addressing her concerns and she is noticeably not functioning well at home or at her providers, which led to her therapist alerting EMS. Patient is at chronic elevated risk of suicide and currently moderate acute risk, but patient would benefit from inpatient admission to readjust her medication regime and provide a safe place to try a new regime that will improve her anxiety and her ability to control her suicidal thoughts and urges.    Patient is agreeable to modify her medication regime, as she reports that they are not adequately addressing her anxiety, sleep, and suicidal ideation. Inpatient team recommends starting patient on Prozac with Lithium to address suicidal ideation with Klonopin to help with the transition until the standing medication starts to be effective. Patient was agreeable to this regime.    12/9- pt reported nausea; potentially due to initiation of prozac. Famotidine administered; can try prozac on empty stomach tomorrow as she tried with food today.     #MDD, recurrent  #LAUREN with panic attacks  - Started Prozac 20 mg once daily on 12/9  - Continue Lithium  mg BID  - Continue Klonopin PO 0.5 mg once daily at bedtime  - Hydroxyzine PO 50 mg PRN q6h for anxiety/ insomnia  - Discontinued home Cymbalta, Trazodone, and Abilify  - For acute agitation not amenable to verbal redirection give haldol 5mg po, Bendaryl 50mg, ativan 2 mg po q6 with escalation to IM if patient is danger to self/other; if IM formulation used please order repeat EKG to ensure qtc <500ms

## 2023-12-09 NOTE — BH INPATIENT PSYCHIATRY PROGRESS NOTE - CURRENT MEDICATION
MEDICATIONS  (STANDING):  clonazePAM  Tablet 0.5 milliGRAM(s) Oral at bedtime  FLUoxetine 20 milliGRAM(s) Oral daily  lithium 300 milliGRAM(s) Oral two times a day    MEDICATIONS  (PRN):  acetaminophen     Tablet .. 650 milliGRAM(s) Oral every 6 hours PRN Mild Pain (1 - 3)  diphenhydrAMINE 50 milliGRAM(s) Oral every 6 hours PRN EPS Prevention  famotidine    Tablet 20 milliGRAM(s) Oral daily PRN nausea, gerd Sx  haloperidol     Tablet 5 milliGRAM(s) Oral every 6 hours PRN Agitation  hydrOXYzine hydrochloride 50 milliGRAM(s) Oral every 6 hours PRN Anxiety/Insomnia  LORazepam     Tablet 2 milliGRAM(s) Oral every 6 hours PRN Severe anxiety

## 2023-12-09 NOTE — BH INPATIENT PSYCHIATRY PROGRESS NOTE - NSBHFUPINTERVALHXFT_PSY_A_CORE
Nursing reports no acute events overnight. No PRNs overnight.    Chart reviewed, patient seen and evaluated in AM. On approach, patient reports that she still feels very depressed, hopeless and guilty about her situation. States she has always been depressed and had fleeting suicidal thoughts in the past, but they have worsened in pervasiveness and intensity in last week; states she did not have a concrete plan but had access to a kitchen knife or medications to OD on. Reports she has had thoughts of self-harming but resisted them (older scars visible on left wrist). 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 Also endorses anxiety and panic. Otherwise, tolerating medications and reports adequate appetite.     Later in day, pt reported nausea; potentially due to initiation of prozac. Famotidine administered; can try prozac on empty stomach tomorrow as she tried with food today.     Patient reports no suicidal ideation, intent or plan. Denied auditory or visual hallucinations. Denied paranoia. Patient compliant with medications; reports no side effects of medications.  Nursing reports no acute events overnight. No PRNs overnight.    Chart reviewed, patient seen and evaluated in AM. On approach, patient reports that she still feels very depressed, hopeless and guilty about her situation. States she has always been depressed and had fleeting suicidal thoughts in the past, but they have worsened in pervasiveness and intensity in last week; states she did not have a concrete plan but had access to a kitchen knife or medications to OD on. Reports she has had thoughts of self-harming but resisted them (older scars visible on left wrist). Reports anhedonia, low energy, poor sleep; she has needed to take the semester off from school (at [employer] for accounting). Also endorses anxiety and panic. Otherwise, tolerating medications and reports adequate appetite.     Later in day, pt reported nausea; potentially due to initiation of prozac. Famotidine administered; can try prozac on empty stomach tomorrow as she tried with food today.     Patient reports no suicidal ideation, intent or plan. Denied auditory or visual hallucinations. Denied paranoia. Patient compliant with medications; reports no side effects of medications.

## 2023-12-10 RX ADMIN — LITHIUM CARBONATE 300 MILLIGRAM(S): 300 TABLET, EXTENDED RELEASE ORAL at 08:29

## 2023-12-10 RX ADMIN — Medication 2 MILLIGRAM(S): at 20:04

## 2023-12-10 RX ADMIN — LITHIUM CARBONATE 300 MILLIGRAM(S): 300 TABLET, EXTENDED RELEASE ORAL at 20:04

## 2023-12-10 RX ADMIN — Medication 0.5 MILLIGRAM(S): at 20:05

## 2023-12-10 RX ADMIN — Medication 2 MILLIGRAM(S): at 13:50

## 2023-12-10 RX ADMIN — Medication 20 MILLIGRAM(S): at 08:29

## 2023-12-10 NOTE — BH INPATIENT PSYCHIATRY PROGRESS NOTE - NSBHASSESSSUMMFT_PSY_ALL_CORE
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    Patient presents with major depressive episode with acute symptoms of anxiety including recent and recurrent panic attacks. Patients symptoms are acute on chronic given her year long history of passive suicidal ideation that recently escalated to an episode of self harm 6 months prior with no new actions of self harm since then though with the urges of self harm escalating over time. Patient reports significant symptoms over the last week with her unable to sleep, work, or generally engage in her life. Patient reports her anxiety as the most severe concern, which also makes it difficult to cope with her chronic suicidal ideation. Patient also endorses that her current medication regime is not addressing her concerns and she is noticeably not functioning well at home or at her providers, which led to her therapist alerting EMS. Patient is at chronic elevated risk of suicide and currently moderate acute risk, but patient would benefit from inpatient admission to readjust her medication regime and provide a safe place to try a new regime that will improve her anxiety and her ability to control her suicidal thoughts and urges.    Patient is agreeable to modify her medication regime, as she reports that they are not adequately addressing her anxiety, sleep, and suicidal ideation. Inpatient team recommends starting patient on Prozac with Lithium to address suicidal ideation with Klonopin to help with the transition until the standing medication starts to be effective. Patient was agreeable to this regime.    12/9- pt reported nausea; potentially due to initiation of prozac. Famotidine administered; can try prozac on empty stomach tomorrow as she tried with food today.     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    Patient presents with major depressive episode with acute symptoms of anxiety including recent and recurrent panic attacks. Patients symptoms are acute on chronic given her year long history of passive suicidal ideation that recently escalated to an episode of self harm 6 months prior with no new actions of self harm since then though with the urges of self harm escalating over time. Patient reports significant symptoms over the last week with her unable to sleep, work, or generally engage in her life. Patient reports her anxiety as the most severe concern, which also makes it difficult to cope with her chronic suicidal ideation. Patient also endorses that her current medication regime is not addressing her concerns and she is noticeably not functioning well at home or at her providers, which led to her therapist alerting EMS. Patient is at chronic elevated risk of suicide and currently moderate acute risk, but patient would benefit from inpatient admission to readjust her medication regime and provide a safe place to try a new regime that will improve her anxiety and her ability to control her suicidal thoughts and urges.    Patient is agreeable to modify her medication regime, as she reports that they are not adequately addressing her anxiety, sleep, and suicidal ideation. Inpatient team recommends starting patient on Prozac with Lithium to address suicidal ideation with Klonopin to help with the transition until the standing medication starts to be effective. Patient was agreeable to this regime.    12/9- pt reported nausea; potentially due to initiation of prozac. Famotidine administered; can try prozac on empty stomach tomorrow as she tried with food today.     #MDD, recurrent  #LAUREN with panic attacks  - Started Prozac 20 mg once daily on 12/9  - Continue Lithium  mg BID  - Continue Klonopin PO 0.5 mg once daily at bedtime  - Hydroxyzine PO 50 mg PRN q6h for anxiety/ insomnia  - Discontinued home Cymbalta, Trazodone, and Abilify  - For acute agitation not amenable to verbal redirection give haldol 5mg po, Bendaryl 50mg, ativan 2 mg po q6 with escalation to IM if patient is danger to self/other; if IM formulation used please order repeat EKG to ensure qtc <500ms

## 2023-12-10 NOTE — BH INPATIENT PSYCHIATRY PROGRESS NOTE - NSBHATTESTBILLING_PSY_A_CORE
52436-Gfgwsnkhuc OBS or IP - moderate complexity OR 35-49 mins 20519-Jsnsfrfvbi OBS or IP - moderate complexity OR 35-49 mins

## 2023-12-10 NOTE — BH INPATIENT PSYCHIATRY PROGRESS NOTE - NSBHMETABOLIC_PSY_ALL_CORE_FT
BMI: BMI (kg/m2): 30.6 (12-08-23 @ 00:02)  HbA1c: A1C with Estimated Average Glucose Result: 5.6 % (06-24-23 @ 08:26)    Glucose:   BP: 141/98 (12-10-23 @ 09:16) (124/78 - 141/98)Vital Signs Last 24 Hrs  T(C): 35.7 (12-10-23 @ 09:16), Max: 36.3 (12-09-23 @ 17:53)  T(F): 96.3 (12-10-23 @ 09:16), Max: 97.4 (12-09-23 @ 17:53)  HR: 102 (12-10-23 @ 09:16) (80 - 102)  BP: 141/98 (12-10-23 @ 09:16) (138/95 - 141/98)  BP(mean): --  RR: 18 (12-10-23 @ 09:16) (16 - 18)  SpO2: --      Lipid Panel: Date/Time: 06-24-23 @ 08:26  Cholesterol, Serum: 196  LDL Cholesterol Calculated: 116  HDL Cholesterol, Serum: 34  Total Cholesterol/HDL Ration Measurement: --  Triglycerides, Serum: 228

## 2023-12-10 NOTE — BH INPATIENT PSYCHIATRY PROGRESS NOTE - NSBHCHARTREVIEWVS_PSY_A_CORE FT
Vital Signs Last 24 Hrs  T(C): 35.7 (12-10-23 @ 09:16), Max: 36.3 (12-09-23 @ 17:53)  T(F): 96.3 (12-10-23 @ 09:16), Max: 97.4 (12-09-23 @ 17:53)  HR: 102 (12-10-23 @ 09:16) (80 - 102)  BP: 141/98 (12-10-23 @ 09:16) (138/95 - 141/98)  BP(mean): --  RR: 18 (12-10-23 @ 09:16) (16 - 18)  SpO2: --

## 2023-12-10 NOTE — BH INPATIENT PSYCHIATRY PROGRESS NOTE - NSBHCHARTREVIEWLAB_PSY_A_CORE FT
12-07    136  |  101  |  14  ----------------------------<  94  4.3   |  22  |  0.6<L>    Ca    9.5      07 Dec 2023 15:48                          15.3   8.53  )-----------( 260      ( 07 Dec 2023 15:48 )             44.5   
12-07    136  |  101  |  14  ----------------------------<  94  4.3   |  22  |  0.6<L>    Ca    9.5      07 Dec 2023 15:48                          15.3   8.53  )-----------( 260      ( 07 Dec 2023 15:48 )             44.5

## 2023-12-10 NOTE — BH SAFETY PLAN - SUICIDE PREVENTION LIFELINE PHONES
Suicide Prevention Lifeline Phone: [phone]) Suicide Prevention Lifeline Phone: [phone] ([phone])

## 2023-12-10 NOTE — BH INPATIENT PSYCHIATRY PROGRESS NOTE - NSBHFUPINTERVALHXFT_PSY_A_CORE
Nursing reports no acute events overnight. No PRNs overnight.    Chart reviewed, patient seen and evaluated in AM. No acute event overnight .   On approach, patient reports that she still feels very depressed, hopeless and guilty about her situation.  She states that she has supportive  and encourage her to speak about  het  thoughts to  the staff.  States she has always been depressed and had fleeting suicidal thoughts in the past, but they have worsened in pervasiveness and intensity in last week; states she did not have a concrete plan but had access to a kitchen knife or medications to OD on. Reports she has had thoughts of self-harming but resisted them (older scars visible on left wrist). 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 Also endorses anxiety and panic. Otherwise, tolerating medications and reports adequat    Patient reports no suicidal ideation, intent or plan. Denied auditory or visual hallucinations. Denied paranoia. Patient compliant with medications; reports no side effects of medications.  Nursing reports no acute events overnight. No PRNs overnight.    Chart reviewed, patient seen and evaluated in AM. No acute event overnight .   On approach, patient reports that she still feels very depressed, hopeless and guilty about her situation.  She states that she has supportive  and encourage her to speak about  het  thoughts to  the staff.  States she has always been depressed and had fleeting suicidal thoughts in the past, but they have worsened in pervasiveness and intensity in last week; states she did not have a concrete plan but had access to a kitchen knife or medications to OD on. Reports she has had thoughts of self-harming but resisted them (older scars visible on left wrist). 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 Also endorses anxiety and panic. Otherwise, tolerating medications and reports adequat    Patient reports no suicidal ideation, intent or plan. Denied auditory or visual hallucinations. Denied paranoia. Patient compliant with medications; reports no side effects of medications.

## 2023-12-11 RX ORDER — LAMOTRIGINE 25 MG/1
25 TABLET, ORALLY DISINTEGRATING ORAL DAILY
Refills: 0 | Status: DISCONTINUED | OUTPATIENT
Start: 2023-12-11 | End: 2023-12-14

## 2023-12-11 RX ADMIN — Medication 50 MILLIGRAM(S): at 23:39

## 2023-12-11 RX ADMIN — LITHIUM CARBONATE 300 MILLIGRAM(S): 300 TABLET, EXTENDED RELEASE ORAL at 08:08

## 2023-12-11 RX ADMIN — Medication 50 MILLIGRAM(S): at 08:13

## 2023-12-11 RX ADMIN — Medication 20 MILLIGRAM(S): at 08:08

## 2023-12-11 RX ADMIN — LAMOTRIGINE 25 MILLIGRAM(S): 25 TABLET, ORALLY DISINTEGRATING ORAL at 14:27

## 2023-12-11 NOTE — BH INPATIENT PSYCHIATRY PROGRESS NOTE - NSBHFUPINTERVALHXFT_PSY_A_CORE
Nursing reports no acute events overnight.    Chart reviewed, patient seen and evaluated in AM. On approach, patient reports that she still feels very anxious with limited improvement in her anxious not her chronic passive suicidal ideation. Patient appeared notably distressed and reported that she occasionally has passive thoughts of wanting to hurt other people when they annoy her, like her dog when it keeps barking or one of the patients when they remained agitated. Patient denies any active desire to act on these thoughts but reports concern for feeling them. Patient reports that overall she does not feel much improved from the start of the weekend and is open to changing her medication regime. Denied auditory or visual hallucinations.  Patient compliant with medications; reports no side effects of medications.

## 2023-12-11 NOTE — BH INPATIENT PSYCHIATRY PROGRESS NOTE - NSBHATTESTBILLING_PSY_A_CORE
53480-Hlvqbkoiuv OBS or IP - moderate complexity OR 35-49 mins 78382-Ejncrghdto OBS or IP - moderate complexity OR 35-49 mins

## 2023-12-11 NOTE — UM REPORT PROGRESS NOTE - NSUMRMPROVIDER_GEN_A_CORE FT
Patient: Mariela Parker   : 1995  INSURANCE: [facility]  ID# [id number]  [phone]    Authorization has been submitted VIA [email or web address]. Auth # [id number]. Follow-up on .  approved LCD and review  fax to  name on file Mariela Kasper  Patient: Mariela Parker   : 1995  INSURANCE: [facility]  ID# [id number]  [phone]    Authorization has been submitted VIA [email or web address]. Auth # [id number]. Follow-up on .  approved LCD and review  fax to  name on file Mariela Kasper  Patient: Mariela Parker   : 1995  INSURANCE: [facility]  ID# [id number]  [phone]    Authorization has been submitted VIA [email or web address]. Auth # [id number]. Follow-up on .  approved LCD and review  fax to  name on file Mariela Ranjith    clinical faxed as above awaiting determination  Patient: Mariela Parker   : 1995  INSURANCE: [facility]  ID# [id number]  [phone]    Authorization has been submitted VIA [email or web address]. Auth # [id number]. Follow-up on .  approved LCD and review  fax to  name on file Mariela Rnajith    clinical faxed as above awaiting determination  Patient: Mariela Parker   : 1995  INSURANCE: [facility]  ID# [id number]  [phone]    Authorization has been submitted VIA [email or web address]. Auth # [id number]. Follow-up on .  approved LCD and review  fax to  name on file Mariela Ranjith    clinical faxed as above awaiting determination approved LCD and review  clinical faxed awaiting determination  Patient: Mariela Parker   : 1995  INSURANCE: [facility]  ID# [id number]  [phone]    Authorization has been submitted VIA [email or web address]. Auth # [id number]. Follow-up on .  approved LCD and review  fax to  name on file Mariela Ranjith    clinical faxed as above awaiting determination approved LCD and review  clinical faxed awaiting determination  Patient: Mariela Parker   : 1995  INSURANCE: [facility]  ID# [id number]  [phone]    Authorization has been submitted VIA [email or web address]. Auth # [id number]. Follow-up on .  approved LCD and review  fax to  name on file Mariela Kasper    clinical faxed as above awaiting determination approved LCD and review  clinical faxed awaiting determination   23 Chery - patient approved unitl 23. Review on  - fax to [phone] Patient: Mariela Parker   : 1995  INSURANCE: [facility]  ID# [id number]  [phone]    Authorization has been submitted VIA [email or web address]. Auth # [id number]. Follow-up on .  approved LCD and review  fax to  name on file Mariela Kasper    clinical faxed as above awaiting determination approved LCD and review  clinical faxed awaiting determination   23 Chery - patient approved unitl 23. Review on  - fax to [phone] Patient: Mariela Parker   : 1995  INSURANCE: [facility]  ID# [id number]  [phone]    Authorization has been submitted VIA [email or web address]. Auth # [id number]. Follow-up on .  approved LCD and review  fax to  name on file Mariela Kasper    clinical faxed as above awaiting determination approved LCD and review  clinical faxed awaiting determination   23 Chery - patient approved unitl 23. Review on  - fax to [phone]    - Faxed DC clinicals to [phone]. Pt discharged.   Patient: Mariela Parker   : 1995  INSURANCE: [facility]  ID# [id number]  [phone]    Authorization has been submitted VIA [email or web address]. Auth # [id number]. Follow-up on .  approved LCD and review  fax to  name on file Mariela Kasper    clinical faxed as above awaiting determination approved LCD and review  clinical faxed awaiting determination   23 Chery - patient approved unitl 23. Review on  - fax to [phone]    - Faxed DC clinicals to [phone]. Pt discharged.

## 2023-12-11 NOTE — BH INPATIENT PSYCHIATRY PROGRESS NOTE - CURRENT MEDICATION
MEDICATIONS  (STANDING):  clonazePAM  Tablet 0.5 milliGRAM(s) Oral at bedtime  FLUoxetine 20 milliGRAM(s) Oral daily  lithium 300 milliGRAM(s) Oral two times a day    MEDICATIONS  (PRN):  acetaminophen     Tablet .. 650 milliGRAM(s) Oral every 6 hours PRN Mild Pain (1 - 3)  diphenhydrAMINE 50 milliGRAM(s) Oral every 6 hours PRN EPS Prevention  famotidine    Tablet 20 milliGRAM(s) Oral daily PRN nausea, gerd Sx  haloperidol     Tablet 5 milliGRAM(s) Oral every 6 hours PRN Agitation  hydrOXYzine hydrochloride 50 milliGRAM(s) Oral every 6 hours PRN Anxiety/Insomnia  LORazepam     Tablet 2 milliGRAM(s) Oral every 6 hours PRN Severe anxiety   MEDICATIONS  (STANDING):  FLUoxetine 20 milliGRAM(s) Oral once  FLUoxetine 40 milliGRAM(s) Oral daily  lamoTRIgine 25 milliGRAM(s) Oral daily  SUMAtriptan Injectable 6 milliGRAM(s) SubCutaneous once    MEDICATIONS  (PRN):  acetaminophen     Tablet .. 650 milliGRAM(s) Oral every 6 hours PRN Mild Pain (1 - 3)  diphenhydrAMINE 50 milliGRAM(s) Oral every 6 hours PRN EPS Prevention  famotidine    Tablet 20 milliGRAM(s) Oral daily PRN nausea, gerd Sx  haloperidol     Tablet 5 milliGRAM(s) Oral every 6 hours PRN Agitation  hydrOXYzine hydrochloride 50 milliGRAM(s) Oral every 6 hours PRN Anxiety/Insomnia  LORazepam     Tablet 2 milliGRAM(s) Oral every 6 hours PRN Severe anxiety

## 2023-12-11 NOTE — BH INPATIENT PSYCHIATRY PROGRESS NOTE - NSBHMETABOLIC_PSY_ALL_CORE_FT
BMI: BMI (kg/m2): 30.6 (12-08-23 @ 00:02)  HbA1c: A1C with Estimated Average Glucose Result: 5.6 % (06-24-23 @ 08:26)    Glucose:   BP: 128/93 (12-11-23 @ 08:25) (124/78 - 141/98)Vital Signs Last 24 Hrs  T(C): 36.4 (12-11-23 @ 08:25), Max: 36.4 (12-11-23 @ 08:25)  T(F): 97.5 (12-11-23 @ 08:25), Max: 97.5 (12-11-23 @ 08:25)  HR: 122 (12-11-23 @ 08:25) (93 - 122)  BP: 128/93 (12-11-23 @ 08:25) (128/93 - 141/84)  BP(mean): --  RR: 18 (12-11-23 @ 08:25) (18 - 18)  SpO2: --      Lipid Panel: Date/Time: 06-24-23 @ 08:26  Cholesterol, Serum: 196  LDL Cholesterol Calculated: 116  HDL Cholesterol, Serum: 34  Total Cholesterol/HDL Ration Measurement: --  Triglycerides, Serum: 228   BMI: BMI (kg/m2): 30.6 (12-08-23 @ 00:02)  HbA1c: A1C with Estimated Average Glucose Result: 5.6 % (06-24-23 @ 08:26)    Glucose:   BP: 128/93 (12-11-23 @ 08:25) (128/93 - 141/98)Vital Signs Last 24 Hrs  T(C): --  T(F): --  HR: --  BP: --  BP(mean): --  RR: --  SpO2: --      Lipid Panel: Date/Time: 06-24-23 @ 08:26  Cholesterol, Serum: 196  LDL Cholesterol Calculated: 116  HDL Cholesterol, Serum: 34  Total Cholesterol/HDL Ration Measurement: --  Triglycerides, Serum: 228
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
## 2023-12-11 NOTE — BH INPATIENT PSYCHIATRY PROGRESS NOTE - NSBHCHARTREVIEWVS_PSY_A_CORE FT
Vital Signs Last 24 Hrs  T(C): 36.4 (12-11-23 @ 08:25), Max: 36.4 (12-11-23 @ 08:25)  T(F): 97.5 (12-11-23 @ 08:25), Max: 97.5 (12-11-23 @ 08:25)  HR: 122 (12-11-23 @ 08:25) (93 - 122)  BP: 128/93 (12-11-23 @ 08:25) (128/93 - 141/84)  BP(mean): --  RR: 18 (12-11-23 @ 08:25) (18 - 18)  SpO2: --     Vital Signs Last 24 Hrs  T(C): --  T(F): --  HR: --  BP: --  BP(mean): --  RR: --  SpO2: --

## 2023-12-12 RX ORDER — FLUOXETINE HCL 10 MG
20 CAPSULE ORAL ONCE
Refills: 0 | Status: COMPLETED | OUTPATIENT
Start: 2023-12-12 | End: 2023-12-12

## 2023-12-12 RX ORDER — FLUOXETINE HCL 10 MG
40 CAPSULE ORAL DAILY
Refills: 0 | Status: DISCONTINUED | OUTPATIENT
Start: 2023-12-12 | End: 2023-12-14

## 2023-12-12 RX ORDER — SUMATRIPTAN SUCCINATE 4 MG/.5ML
6 INJECTION, SOLUTION SUBCUTANEOUS ONCE
Refills: 0 | Status: COMPLETED | OUTPATIENT
Start: 2023-12-12 | End: 2023-12-12

## 2023-12-12 RX ORDER — SUMATRIPTAN SUCCINATE 4 MG/.5ML
100 INJECTION, SOLUTION SUBCUTANEOUS ONCE
Refills: 0 | Status: DISCONTINUED | OUTPATIENT
Start: 2023-12-12 | End: 2023-12-12

## 2023-12-12 RX ADMIN — Medication 2 MILLIGRAM(S): at 16:50

## 2023-12-12 RX ADMIN — SUMATRIPTAN SUCCINATE 6 MILLIGRAM(S): 4 INJECTION, SOLUTION SUBCUTANEOUS at 12:13

## 2023-12-12 RX ADMIN — Medication 20 MILLIGRAM(S): at 11:43

## 2023-12-12 RX ADMIN — LAMOTRIGINE 25 MILLIGRAM(S): 25 TABLET, ORALLY DISINTEGRATING ORAL at 08:06

## 2023-12-12 RX ADMIN — Medication 20 MILLIGRAM(S): at 08:06

## 2023-12-12 RX ADMIN — SUMATRIPTAN SUCCINATE 6 MILLIGRAM(S): 4 INJECTION, SOLUTION SUBCUTANEOUS at 11:43

## 2023-12-12 RX ADMIN — Medication 2 MILLIGRAM(S): at 22:40

## 2023-12-12 NOTE — BH DISCHARGE NOTE NURSING/SOCIAL WORK/PSYCH REHAB - PATIENT PORTAL LINK FT
You can access the FollowMyHealth Patient Portal offered by [facility] by registering at the following website: [email or web address]. By joining [employer]’s FollowMyHealth portal, you will also be able to view your health information using other applications (apps) compatible with our system. You can access the FollowMyHealth Patient Portal offered by [facility] by registering at the following website: [email or web address]. By joining [email or web address]’s FollowMyHealth portal, you will also be able to view your health information using other applications (apps) compatible with our system.
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
## 2023-12-12 NOTE — BH DISCHARGE NOTE NURSING/SOCIAL WORK/PSYCH REHAB - NSDCVIVACCINE_GEN_ALL_CORE_FT
Tdap; 23-Jun-2023 03:17; Manju Dillon (RN); [employer]; [id number] (Exp. Date: 08-Mar-2025); IntraMuscular; Deltoid Left.; 0.5 milliLiter(s); VIS (VIS Published: 09-May-2013, VIS Presented: 23-Jun-2023);    Tdap; 23-Jun-2023 03:17; Manju Dillon (RN); [employer]; [id number] (Exp. Date: 08-Mar-2025); IntraMuscular; Deltoid Left.; 0.5 milliLiter(s); VIS (VIS Published: 09-May-2013, VIS Presented: 23-Jun-2023);

## 2023-12-12 NOTE — BH INPATIENT PSYCHIATRY PROGRESS NOTE - CURRENT MEDICATION
MEDICATIONS  (STANDING):  FLUoxetine 20 milliGRAM(s) Oral daily  FLUoxetine 20 milliGRAM(s) Oral once  lamoTRIgine 25 milliGRAM(s) Oral daily  SUMAtriptan 100 milliGRAM(s) Oral once    MEDICATIONS  (PRN):  acetaminophen     Tablet .. 650 milliGRAM(s) Oral every 6 hours PRN Mild Pain (1 - 3)  diphenhydrAMINE 50 milliGRAM(s) Oral every 6 hours PRN EPS Prevention  famotidine    Tablet 20 milliGRAM(s) Oral daily PRN nausea, gerd Sx  haloperidol     Tablet 5 milliGRAM(s) Oral every 6 hours PRN Agitation  hydrOXYzine hydrochloride 50 milliGRAM(s) Oral every 6 hours PRN Anxiety/Insomnia  LORazepam     Tablet 2 milliGRAM(s) Oral every 6 hours PRN Severe anxiety
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
## 2023-12-12 NOTE — BH INPATIENT PSYCHIATRY PROGRESS NOTE - NSBHASSESSSUMMFT_PSY_ALL_CORE
28 year old  female, currently a Bachelor's student in Accounting at [employer], domciled with  in private residence, with PMH of migraines and PPH of MDD, LAUREN, ADHD, history of trauma, who was admitted after her therapist activated EMS after patient endorsed suicidal ideation.    Patient presents with major depressive episode with acute symptoms of anxiety including recent and recurrent panic attacks. Patients symptoms are acute on chronic given her year long history of passive suicidal ideation that recently escalated to an episode of self harm 6 months prior with no new actions of self harm since then though with the urges of self harm escalating over time. Patient reports significant symptoms over the last week with her unable to sleep, work, or generally engage in her life. Patient reports her anxiety as the most severe concern, which also makes it difficult to cope with her chronic suicidal ideation. Patient also endorses that her current medication regime is not addressing her concerns and she is noticeably not functioning well at home or at her providers, which led to her therapist alerting EMS. Patient is at chronic elevated risk of suicide and currently moderate acute risk, but patient would benefit from inpatient admission to readjust her medication regime and provide a safe place to try a new regime that will improve her anxiety and her ability to control her suicidal thoughts and urges.    Patient is agreeable to modify her medication regime, as she reports that they are not adequately addressing her anxiety, sleep, and suicidal ideation. Inpatient team recommends starting patient on Prozac with Lithium to address suicidal ideation with Klonopin to help with the transition until the standing medication starts to be effective. Patient was agreeable to this regime but after the weekend, it was not effective in addressing her anxiety or passive suicidality. Patient will have her Klonopin and Lithium discontinued and will be tried on the mood stabilizer Lamictal. Patient was alert of risks and side effects of this medication, including Harvey Andreas Syndrome and patient was agreeable. Once it is clear patient tolerates her new medications and patient's  is contacted to assess for her functional status, patient is ready for discharge planning.    #MDD, recurrent  #LAUREN with panic attacks  - Increase Prozac to 40 mg once daily  - C/w Lamictal Po 25 mg once daily  - Discontinue Lithium   - Discontinue Klonopin PO 0.5 mg once daily   - Hydroxyzine PO 50 mg PRN q6h for anxiety/ insomnia  - Discontinued home Cymbalta, Trazodone, and Abilify  - For acute agitation not amenable to verbal redirection give haldol 5mg po, Bendaryl 50mg, ativan 2 mg po q6 with escalation to IM if patient is danger to self/other; if IM formulation used please order repeat EKG to ensure qtc <500ms   28 year old  female, currently a Bachelor's student in Accounting at [employer], domciled with  in private residence, with PMH of migraines and PPH of MDD, LAUREN, ADHD, history of trauma, who was admitted after her therapist activated EMS after patient endorsed suicidal ideation.    Patient presents with major depressive episode with acute symptoms of anxiety including recent and recurrent panic attacks. Patients symptoms are acute on chronic given her year long history of passive suicidal ideation that recently escalated to an episode of self harm 6 months prior with no new actions of self harm since then though with the urges of self harm escalating over time. Patient reports significant symptoms over the last week with her unable to sleep, work, or generally engage in her life. Patient reports her anxiety as the most severe concern, which also makes it difficult to cope with her chronic suicidal ideation. Patient also endorses that her current medication regime is not addressing her concerns and she is noticeably not functioning well at home or at her providers, which led to her therapist alerting EMS. Patient is at chronic elevated risk of suicide and currently moderate acute risk, but patient would benefit from inpatient admission to readjust her medication regime and provide a safe place to try a new regime that will improve her anxiety and her ability to control her suicidal thoughts and urges.    Patient is agreeable to modify her medication regime, as she reports that they are not adequately addressing her anxiety, sleep, and suicidal ideation. Inpatient team recommends starting patient on Prozac with Lithium to address suicidal ideation with Klonopin to help with the transition until the standing medication starts to be effective. Patient was agreeable to this regime but after the weekend, it was not effective in addressing her anxiety or passive suicidality. Patient will have her Klonopin and Lithium discontinued and will be tried on the mood stabilizer Lamictal. Patient was alert of risks and side effects of this medication, including Harvey Andreas Syndrome and patient was agreeable. Once it is clear patient tolerates her new medications and patient's  is contacted to assess for her functional status, patient is ready for discharge planning.    #MDD, recurrent  #LAUREN with panic attacks  - Increase Prozac to 40 mg once daily  - C/w Lamictal Po 25 mg once daily  - Discontinue Lithium   - Discontinue Klonopin PO 0.5 mg once daily   - Hydroxyzine PO 50 mg PRN q6h for anxiety/ insomnia  - Discontinued home Cymbalta, Trazodone, and Abilify  - For acute agitation not amenable to verbal redirection give haldol 5mg po, Bendaryl 50mg, ativan 2 mg po q6 with escalation to IM if patient is danger to self/other; if IM formulation used please order repeat EKG to ensure qtc <500ms

## 2023-12-12 NOTE — BH INPATIENT PSYCHIATRY DISCHARGE NOTE - DESCRIPTION
27 year old engaged female, previously employed at [employer], currently pursuing a Associates, possibly Bachelor's degree in Accounting from the [employer], currently on temporary medical leave due to migraines, domiciled in private apartment with estelle with estelle's mother and 14 year old brother. Born in [facility], moved with Mother and brother to [place], then stayed in [place] for one, then moved to [place] two years ago. Patient is not in contact with brother in  or sister still in [facility] [place] [facility].  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 Born in [facility], moved with Mother and brother to [place], then stayed in [place] for one, then moved to [place] two years ago. Patient is not in contact with brother in  or sister still in [facility].

## 2023-12-12 NOTE — BH INPATIENT PSYCHIATRY DISCHARGE NOTE - NSBHMETABOLIC_PSY_ALL_CORE_FT
BMI: BMI (kg/m2): 30.6 (12-08-23 @ 00:02)  HbA1c: A1C with Estimated Average Glucose Result: 5.6 % (06-24-23 @ 08:26)    Glucose:   BP: 141/96 (12-12-23 @ 12:42) (128/93 - 141/98)Vital Signs Last 24 Hrs  T(C): --  T(F): --  HR: 87 (12-12-23 @ 12:42) (87 - 87)  BP: 141/96 (12-12-23 @ 12:42) (141/96 - 141/96)  BP(mean): --  RR: --  SpO2: --      Lipid Panel: Date/Time: 06-24-23 @ 08:26  Cholesterol, Serum: 196  LDL Cholesterol Calculated: 116  HDL Cholesterol, Serum: 34  Total Cholesterol/HDL Ration Measurement: --  Triglycerides, Serum: 228

## 2023-12-12 NOTE — BH INPATIENT PSYCHIATRY DISCHARGE NOTE - NSBHFUPINTERVALHXFT_PSY_A_CORE
Chart reviewed. Nursing reports no acute events overnight.    Patient seen and evaluated in AM. On approach, patient reports that she still feels improved. Patient does report that she feels like she would benefit from outpatient therapy but notes that she believes she is better than when she was admitted. Patient endorses having her  at home as a safe person to confide in and ask for help if she has concerns. Patient compliant with medications; reports no side effects of medications.  Chart reviewed. Nursing reports no acute events overnight.    Patient seen and evaluated in AM. On approach, patient reports that she still feels improved. Patient does report that she feels like she would benefit from outpatient therapy but notes that she believes she is better than when she was admitted. Patient endorses having her  at home as a safe person to confide in and ask for help if she has concerns. Patient compliant with medications.

## 2023-12-12 NOTE — BH DISCHARGE NOTE NURSING/SOCIAL WORK/PSYCH REHAB - NSCDUDCCRISIS_PSY_A_CORE
Critical [facility] hospital Well  1 [phone] [phone]  Text "WELL" to 58777  Website: www.Boostable.RF Code/.National Suicide Prevention Lifeline 1 (813) 396-8820(346) 621-8594/988 Suicide and Crisis Lifeline [facility] Well  1 (186) [facility] ([phone][facility]  Text "WELL" to 68613  Website: www.Pockit.Gemmyo/.National Suicide Prevention Lifeline 4 (317) 901-2143(873) 789-3520/988 Suicide and Crisis Lifeline

## 2023-12-12 NOTE — BH INPATIENT PSYCHIATRY PROGRESS NOTE - NSBHFUPINTERVALHXFT_PSY_A_CORE
Chart reviewed. Nursing reports no acute events overnight.    Patient seen and evaluated in AM. On approach, patient reports that she still feels improved though patient remains concerned for her anxiety and intrusive thoughts of hurting others. Patient does report that she feels like she would benefit from outpatient therapy but notes that she believes she is better than when she was admitted. Patient endorses having her  at home as a safe person to confide in and ask for help if she has concerns. Patient compliant with medications; reports no side effects of medications.

## 2023-12-12 NOTE — BH INPATIENT PSYCHIATRY DISCHARGE NOTE - NSDCMRMEDTOKEN_GEN_ALL_CORE_FT
FLUoxetine 40 mg oral capsule: 1 cap(s) orally once a day  lamoTRIgine 25 mg oral tablet: 1 tab(s) orally once a day

## 2023-12-12 NOTE — BH INPATIENT PSYCHIATRY PROGRESS NOTE - NSBHATTESTBILLING_PSY_A_CORE
44694-Sidljxtnim OBS or IP - low complexity OR 25-34 mins 75262-Xcwquurmvx OBS or IP - low complexity OR 25-34 mins

## 2023-12-12 NOTE — BH DISCHARGE NOTE NURSING/SOCIAL WORK/PSYCH REHAB - NSDCADDINFO1FT_PSY_ALL_CORE
In-Person appointment with Dr. Harvey       *YOU MUST ATTEND THE INITIAL INTAKE APPOINTMENT IN ORDER TO SEE YOUR PSYCHIATRIST.  MISSED INTAKE WILL CANCEL PSYCHIATRIST'S APPT AUTOMATICALLY!* In-Person appointment with Kaitlynn In-Person appointment with Dr. Orourke

## 2023-12-12 NOTE — BH INPATIENT PSYCHIATRY DISCHARGE NOTE - HOSPITAL COURSE
Patient presented herself to the ED with reports of chronic passive suicidal ideation that patient recently was concerned would be escalated to active suicidal thoughts. Patient also reported concerns about thoughts of harming her dog or other people when patient is particularly frustrated but patient denies taking action or even any true active ideation of harming others, but patient expresses concern that she has these thoughts. Patient's previous home medications were discontinued and patient was started on Prozac 20 mg, Lithium 300mg BID, and Klonopin 1.0 mg at bedtime. After limited effect over the weekend, the Lithium and Klonopin were discontinued and Prozac was increased to 40 mg, which patient tolerated well. Patient was also started on Lamictal PO 25 mg which patient also tolerated well. Patient was counselled about the importance of going to therapy and engaging in her life and patient was agreeable and reported she was ready to go home. Patient is stable and not currently at acute risk of harm to herself or others.

## 2023-12-12 NOTE — BH DISCHARGE NOTE NURSING/SOCIAL WORK/PSYCH REHAB - NSBHDCADDR1FT_A_CORE
[address] [facility] [address]  [address] [facility] [address]

## 2023-12-12 NOTE — BH INPATIENT PSYCHIATRY DISCHARGE NOTE - HPI (INCLUDE ILLNESS QUALITY, SEVERITY, DURATION, TIMING, CONTEXT, MODIFYING FACTORS, ASSOCIATED SIGNS AND SYMPTOMS)
28 year old  female, currently a Bachelor's student in Accounting at [employer], domciled with  in private residence, with PMH of migraines and PPH of MDD, LAUREN, ADHD, history of trauma, who was admitted after her therapist activated EMS after patient endorsed suicidal ideation.    Per ED note, patient states that she "has always had depression and suicidal thoughts", but they have been getting worse over the last couple of months. That patient states that her SI is usually passive and she knows she won't act it, but it has gotten worse lately and she is afraid she might act on it. She has had thoughts of overdosing on pills and killing herself with a knife. She denies any preparatory actions. She states that this is the worst he SI has ever been. Patient endorses difficulty sleep, eating poorly, having low energy and low motivation, losing interest in things she used to enjoy, and struggling to maintain hygiene over the last 2 months. Patient was admitted to a psychiatric unit in July of this year for self harm/cutting. She has not cut herself since then, but she has been having increasingly stronger urges to self harm. She was admitted to [facility] 2 months ago when self harm urges got worse, she told her outpatient psychiatrist who then called EMS. Patient has been seeing a therapist weekly since she was discharged 2 months ago. She also attends a weekly DBT group with that therapist.    On presentation, patient appeared bright and cooperative and smiled when speaking to interviewers, non-congruent to her reports that corroborated the above that she continues to have daily passive suicidal thoughts that remain difficult to control especially when she is more anxious. Patient reports that her depressive and anxious symptoms have been worse over the last week though there was no acute recent stressor. Patient reported that the passive suicidal thoughts have been present for years but the urge to self harm had been increasing recently, though she denies making any suicidal preparation or new episodes of self harm. Patient continues to endorse poor sleep, low energy, and a lack of engagement in her life. Patient also reports having panic attacks consistently usually about every two weeks. Patient endorses that the anxiety is the most acute concern currently and says she needs help with that as her current medications have not been effective in treating them. Patient is open to new medication regime. Patient denies any history of auditory hallucinations or homicidal ideation. 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    Per ED note, patient states that she "has always had depression and suicidal thoughts", but they have been getting worse over the last couple of months. That patient states that her SI is usually passive and she knows she won't act it, but it has gotten worse lately and she is afraid she might act on it. She has had thoughts of overdosing on pills and killing herself with a knife. She denies any preparatory actions. She states that this is the worst he SI has ever been. Patient endorses difficulty sleep, eating poorly, having low energy and low motivation, losing interest in things she used to enjoy, and struggling to maintain hygiene over the last 2 months. Patient was admitted to a psychiatric unit in July of this year for self harm/cutting. She has not cut herself since then, but she has been having increasingly stronger urges to self harm. She was admitted to [facility] 2 months ago when self harm urges got worse, she told her outpatient psychiatrist who then called EMS. Patient has been seeing a therapist weekly since she was discharged 2 months ago. She also attends a weekly DBT group with that therapist.    On presentation, patient appeared bright and cooperative and smiled when speaking to interviewers, non-congruent to her reports that corroborated the above that she continues to have daily passive suicidal thoughts that remain difficult to control especially when she is more anxious. Patient reports that her depressive and anxious symptoms have been worse over the last week though there was no acute recent stressor. Patient reported that the passive suicidal thoughts have been present for years but the urge to self harm had been increasing recently, though she denies making any suicidal preparation or new episodes of self harm. Patient continues to endorse poor sleep, low energy, and a lack of engagement in her life. Patient also reports having panic attacks consistently usually about every two weeks. Patient endorses that the anxiety is the most acute concern currently and says she needs help with that as her current medications have not been effective in treating them. Patient is open to new medication regime. Patient denies any history of auditory hallucinations or homicidal ideation.

## 2023-12-12 NOTE — BH INPATIENT PSYCHIATRY DISCHARGE NOTE - NSDCCPCAREPLAN_GEN_ALL_CORE_FT
PRINCIPAL DISCHARGE DIAGNOSIS  Diagnosis: Severe episode of recurrent major depressive disorder, without psychotic features  Assessment and Plan of Treatment: Please continue prescribed medications and follow up with outpatient provider

## 2023-12-12 NOTE — BH INPATIENT PSYCHIATRY PROGRESS NOTE - NSBHMETABOLIC_PSY_ALL_CORE_FT
BMI: BMI (kg/m2): 30.6 (12-08-23 @ 00:02)  HbA1c: A1C with Estimated Average Glucose Result: 5.6 % (06-24-23 @ 08:26)    Glucose:   BP: 128/93 (12-11-23 @ 08:25) (128/93 - 141/98)Vital Signs Last 24 Hrs  T(C): --  T(F): --  HR: --  BP: --  BP(mean): --  RR: --  SpO2: --      Lipid Panel: Date/Time: 06-24-23 @ 08:26  Cholesterol, Serum: 196  LDL Cholesterol Calculated: 116  HDL Cholesterol, Serum: 34  Total Cholesterol/HDL Ration Measurement: --  Triglycerides, Serum: 228

## 2023-12-12 NOTE — BH DISCHARGE NOTE NURSING/SOCIAL WORK/PSYCH REHAB - NSBHDCADDR2FT_A_CORE
[address] [facility] [address]  [address] [facility] [address]

## 2023-12-13 ENCOUNTER — APPOINTMENT (OUTPATIENT)
Dept: RHEUMATOLOGY | Facility: CLINIC | Age: 28
End: 2023-12-13

## 2023-12-13 RX ORDER — FLUOXETINE HCL 10 MG
1 CAPSULE ORAL
Qty: 30 | Refills: 0
Start: 2023-12-13 | End: 2024-01-11

## 2023-12-13 RX ORDER — LAMOTRIGINE 25 MG/1
1 TABLET, ORALLY DISINTEGRATING ORAL
Qty: 30 | Refills: 0
Start: 2023-12-13 | End: 2024-01-11

## 2023-12-13 RX ADMIN — LAMOTRIGINE 25 MILLIGRAM(S): 25 TABLET, ORALLY DISINTEGRATING ORAL at 10:25

## 2023-12-13 RX ADMIN — Medication 50 MILLIGRAM(S): at 13:36

## 2023-12-13 RX ADMIN — Medication 40 MILLIGRAM(S): at 10:24

## 2023-12-13 NOTE — BH INPATIENT PSYCHIATRY PROGRESS NOTE - NSBHCHARTREVIEWVS_PSY_A_CORE FT
Vital Signs Last 24 Hrs  T(C): 35.7 (12-13-23 @ 07:37), Max: 35.7 (12-12-23 @ 16:15)  T(F): 96.2 (12-13-23 @ 07:37), Max: 96.3 (12-12-23 @ 16:15)  HR: 94 (12-13-23 @ 07:37) (87 - 94)  BP: 131/76 (12-13-23 @ 07:37) (131/76 - 141/96)  BP(mean): --  RR: 16 (12-13-23 @ 07:37) (16 - 16)  SpO2: --

## 2023-12-13 NOTE — BH INPATIENT PSYCHIATRY PROGRESS NOTE - NSTXSUICIDGOAL_PSY_ALL_CORE
Will express feelings associated with suicidal ideation

## 2023-12-13 NOTE — BH INPATIENT PSYCHIATRY PROGRESS NOTE - PRN MEDS
MEDICATIONS  (PRN):  acetaminophen     Tablet .. 650 milliGRAM(s) Oral every 6 hours PRN Mild Pain (1 - 3)  diphenhydrAMINE 50 milliGRAM(s) Oral every 6 hours PRN EPS Prevention  famotidine    Tablet 20 milliGRAM(s) Oral daily PRN nausea, gerd Sx  haloperidol     Tablet 5 milliGRAM(s) Oral every 6 hours PRN Agitation  hydrOXYzine hydrochloride 50 milliGRAM(s) Oral every 6 hours PRN Anxiety/Insomnia  LORazepam     Tablet 2 milliGRAM(s) Oral every 6 hours PRN Severe anxiety  

## 2023-12-13 NOTE — BH INPATIENT PSYCHIATRY PROGRESS NOTE - NSBHATTESTCOMMENTATTENDFT_PSY_A_CORE
Evaluated patient with the resident Dr. Vaz. As per nurses report, patient appears depressed, with low mood, isolative in her room. On interview, Mariela presents anxious looking, reports feeling anxious and depressed ("has been depressed all her life"), with intermittent SI although no as bad as PTA. Agree with resident's assessment and plan. 
Patient was seen, evaluated, and discussed with the Resident, Dr Hodges. Chart reviewed.      Is compliant with medications, denied side effects.      Agree with assessment and plan above. Continue with medications as above. 
Patient was seen, evaluated, and discussed with the Resident, Dr Hodges. Chart reviewed.      Is compliant with medications, denied side effects.      Agree with assessment and plan above. Continue with medications as above. 
Evaluated patient with the resident Dr. Vaz. As per nurses report, patient appears depressed, with low mood, isolative in her room. On interview, Mariela presents anxious looking, reports feeling anxious and depressed ("has been depressed all her life"), with intermittent SI although no as bad as PTA. Agree with resident's assessment and plan. 
Patient was seen, evaluated, and discussed with the Resident, Dr Hodges. Chart reviewed.      Is compliant with medications, denied side effects.      Agree with assessment and plan above. Continue with medications as above.

## 2023-12-13 NOTE — BH INPATIENT PSYCHIATRY PROGRESS NOTE - NSBHMETABOLIC_PSY_ALL_CORE_FT
BMI: BMI (kg/m2): 30.6 (12-08-23 @ 00:02)  HbA1c: A1C with Estimated Average Glucose Result: 5.6 % (06-24-23 @ 08:26)    Glucose:   BP: 131/76 (12-13-23 @ 07:37) (128/93 - 141/96)Vital Signs Last 24 Hrs  T(C): 35.7 (12-13-23 @ 07:37), Max: 35.7 (12-12-23 @ 16:15)  T(F): 96.2 (12-13-23 @ 07:37), Max: 96.3 (12-12-23 @ 16:15)  HR: 94 (12-13-23 @ 07:37) (87 - 94)  BP: 131/76 (12-13-23 @ 07:37) (131/76 - 141/96)  BP(mean): --  RR: 16 (12-13-23 @ 07:37) (16 - 16)  SpO2: --      Lipid Panel: Date/Time: 06-24-23 @ 08:26  Cholesterol, Serum: 196  LDL Cholesterol Calculated: 116  HDL Cholesterol, Serum: 34  Total Cholesterol/HDL Ration Measurement: --  Triglycerides, Serum: 228

## 2023-12-13 NOTE — BH INPATIENT PSYCHIATRY PROGRESS NOTE - NSICDXBHPRIMARYDX_PSY_ALL_CORE
MDD (major depressive disorder), recurrent severe, without psychosis   F33.2  

## 2023-12-13 NOTE — BH INPATIENT PSYCHIATRY PROGRESS NOTE - NSICDXBHSECONDARYDX_PSY_ALL_CORE
Generalized anxiety disorder with panic attacks   F41.0  

## 2023-12-13 NOTE — BH INPATIENT PSYCHIATRY PROGRESS NOTE - NSTXDEPRESGOAL_PSY_ALL_CORE
How Severe Is Your Cyst?: mild
Is This A New Presentation, Or A Follow-Up?: Cyst
Will identify 2 coping skills that assist in improving mood

## 2023-12-13 NOTE — BH INPATIENT PSYCHIATRY PROGRESS NOTE - NSBHMSETHTPROC_PSY_A_CORE
Linear/Normal reasoning

## 2023-12-13 NOTE — BH INPATIENT PSYCHIATRY PROGRESS NOTE - NSDCCRITERIA_PSY_ALL_CORE
Patient is no longer a danger to herself or others

## 2023-12-13 NOTE — BH INPATIENT PSYCHIATRY PROGRESS NOTE - NSBHFUPINTERVALHXFT_PSY_A_CORE
Chart reviewed. Nursing reports no acute events overnight.    Patient seen and evaluated in AM. On approach, patient reports that she still feels improved though patient remains concerned for her anxiety and intrusive thoughts of hurting others. Patient does report that she feels like she would benefit from outpatient therapy but notes that she believes she is better than when she was admitted. Patient endorses having her  at home as a safe person to confide in and ask for help if she has concerns. Patient compliant with medications; reports no side effects of medications.  Chart reviewed. Nursing reports no acute events overnight. Patient did receive Ativan 2 mg overnight for anxiety after patient had period of low mood and anxiety with concerns that her anxiety and anhedonia will never be improved; patient was counselled by resident.    Patient seen and evaluated in AM. On approach, patient reports that she still feels improved. Patient does report that she feels like she would benefit from outpatient therapy but notes that she believes she is better than when she was admitted. Patient endorses having her  at home as a safe person to confide in and ask for help if she has concerns. Patient compliant with medications; reports no side effects of medications though she reported feeling more irritable, though she reports that it makes her feel like she is able to speak up more which she prefers.

## 2023-12-13 NOTE — BH INPATIENT PSYCHIATRY PROGRESS NOTE - NSBHATTESTTYPEVISIT_PSY_A_CORE
Attending with Resident/Fellow/Student

## 2023-12-13 NOTE — BH INPATIENT PSYCHIATRY PROGRESS NOTE - NSBHPROGMEDSE_PSY_A_CORE FT
Pt reported nausea, potentially due to initiation of prozac. Famotidine administered; can try prozac on empty stomach tomorrow as she tried with food today. 
Pt reported nausea, potentially due to initiation of prozac. Famotidine administered; can try prozac on empty stomach tomorrow as she tried with food today. 
Mild irritably

## 2023-12-13 NOTE — BH INPATIENT PSYCHIATRY PROGRESS NOTE - CURRENT MEDICATION
MEDICATIONS  (STANDING):  FLUoxetine 40 milliGRAM(s) Oral daily  lamoTRIgine 25 milliGRAM(s) Oral daily    MEDICATIONS  (PRN):  acetaminophen     Tablet .. 650 milliGRAM(s) Oral every 6 hours PRN Mild Pain (1 - 3)  diphenhydrAMINE 50 milliGRAM(s) Oral every 6 hours PRN EPS Prevention  famotidine    Tablet 20 milliGRAM(s) Oral daily PRN nausea, gerd Sx  haloperidol     Tablet 5 milliGRAM(s) Oral every 6 hours PRN Agitation  hydrOXYzine hydrochloride 50 milliGRAM(s) Oral every 6 hours PRN Anxiety/Insomnia  LORazepam     Tablet 2 milliGRAM(s) Oral every 6 hours PRN Severe anxiety

## 2023-12-13 NOTE — BH INPATIENT PSYCHIATRY PROGRESS NOTE - NSBHASSESSSUMMFT_PSY_ALL_CORE
28 year old  female, currently a Bachelor's student in Accounting at [employer], domciled with  in private residence, with PMH of migraines and PPH of MDD, LAUREN, ADHD, history of trauma, who was admitted after her therapist activated EMS after patient endorsed suicidal ideation.    Patient presents with major depressive episode with acute symptoms of anxiety including recent and recurrent panic attacks. Patients symptoms are acute on chronic given her year long history of passive suicidal ideation that recently escalated to an episode of self harm 6 months prior with no new actions of self harm since then though with the urges of self harm escalating over time. Patient reports significant symptoms over the last week with her unable to sleep, work, or generally engage in her life. Patient reports her anxiety as the most severe concern, which also makes it difficult to cope with her chronic suicidal ideation. Patient also endorses that her current medication regime is not addressing her concerns and she is noticeably not functioning well at home or at her providers, which led to her therapist alerting EMS. Patient is at chronic elevated risk of suicide and currently moderate acute risk, but patient would benefit from inpatient admission to readjust her medication regime and provide a safe place to try a new regime that will improve her anxiety and her ability to control her suicidal thoughts and urges.    Patient is agreeable to modify her medication regime, as she reports that they are not adequately addressing her anxiety, sleep, and suicidal ideation. Inpatient team recommends starting patient on Prozac with Lithium to address suicidal ideation with Klonopin to help with the transition until the standing medication starts to be effective. Patient was agreeable to this regime but after the weekend, it was not effective in addressing her anxiety or passive suicidality. Patient will have her Klonopin and Lithium discontinued and will be tried on the mood stabilizer Lamictal. Patient was alert of risks and side effects of this medication, including Harvey Andreas Syndrome and patient was agreeable. Once it is clear patient tolerates her new medications and patient's  is contacted to assess for her functional status, patient is ready for discharge planning.    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    Patient presents with major depressive episode with acute symptoms of anxiety including recent and recurrent panic attacks. Patients symptoms are acute on chronic given her year long history of passive suicidal ideation that recently escalated to an episode of self harm 6 months prior with no new actions of self harm since then though with the urges of self harm escalating over time. Patient reports significant symptoms over the last week with her unable to sleep, work, or generally engage in her life. Patient reports her anxiety as the most severe concern, which also makes it difficult to cope with her chronic suicidal ideation. Patient also endorses that her current medication regime is not addressing her concerns and she is noticeably not functioning well at home or at her providers, which led to her therapist alerting EMS. Patient is at chronic elevated risk of suicide and currently moderate acute risk, but patient would benefit from inpatient admission to readjust her medication regime and provide a safe place to try a new regime that will improve her anxiety and her ability to control her suicidal thoughts and urges.    Patient is agreeable to modify her medication regime, as she reports that they are not adequately addressing her anxiety, sleep, and suicidal ideation. Inpatient team recommends starting patient on Prozac with Lithium to address suicidal ideation with Klonopin to help with the transition until the standing medication starts to be effective. Patient was agreeable to this regime but after the weekend, it was not effective in addressing her anxiety or passive suicidality. Patient will have her Klonopin and Lithium discontinued and will be tried on the mood stabilizer Lamictal. Patient was alert of risks and side effects of this medication, including Harvey Andreas Syndrome and patient was agreeable. Once it is clear patient tolerates her new medications and patient's  is contacted to assess for her functional status, patient is ready for discharge planning.    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    Patient presents with major depressive episode with acute symptoms of anxiety including recent and recurrent panic attacks. Patients symptoms are acute on chronic given her year long history of passive suicidal ideation that recently escalated to an episode of self harm 6 months prior with no new actions of self harm since then though with the urges of self harm escalating over time. Patient reports significant symptoms over the last week with her unable to sleep, work, or generally engage in her life. Patient reports her anxiety as the most severe concern, which also makes it difficult to cope with her chronic suicidal ideation. Patient also endorses that her current medication regime is not addressing her concerns and she is noticeably not functioning well at home or at her providers, which led to her therapist alerting EMS. Patient is at chronic elevated risk of suicide and currently moderate acute risk, but patient would benefit from inpatient admission to readjust her medication regime and provide a safe place to try a new regime that will improve her anxiety and her ability to control her suicidal thoughts and urges.    Patient is agreeable to modify her medication regime, as she reports that they are not adequately addressing her anxiety, sleep, and suicidal ideation. Inpatient team recommends starting patient on Prozac with Lithium to address suicidal ideation with Klonopin to help with the transition until the standing medication starts to be effective. Patient was agreeable to this regime but after the weekend, it was not effective in addressing her anxiety or passive suicidality. Patient will have her Klonopin and Lithium discontinued and will be tried on the mood stabilizer Lamictal. Patient was alert of risks and side effects of this medication, including Harvey Andreas Syndrome and patient was agreeable. Once it is clear patient tolerates her new medications and patient's  is contacted to assess for her functional status, patient is ready for discharge planning.    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    Patient presents with major depressive episode with acute symptoms of anxiety including recent and recurrent panic attacks. Patients symptoms are acute on chronic given her year long history of passive suicidal ideation that recently escalated to an episode of self harm 6 months prior with no new actions of self harm since then though with the urges of self harm escalating over time. Patient reports significant symptoms over the last week with her unable to sleep, work, or generally engage in her life. Patient reports her anxiety as the most severe concern, which also makes it difficult to cope with her chronic suicidal ideation. Patient also endorses that her current medication regime is not addressing her concerns and she is noticeably not functioning well at home or at her providers, which led to her therapist alerting EMS. Patient is at chronic elevated risk of suicide and currently moderate acute risk, but patient would benefit from inpatient admission to readjust her medication regime and provide a safe place to try a new regime that will improve her anxiety and her ability to control her suicidal thoughts and urges.    Patient is agreeable to modify her medication regime, as she reports that they are not adequately addressing her anxiety, sleep, and suicidal ideation. Inpatient team recommends starting patient on Prozac with Lithium to address suicidal ideation with Klonopin to help with the transition until the standing medication starts to be effective. Patient was agreeable to this regime but after the weekend, it was not effective in addressing her anxiety or passive suicidality. Patient will have her Klonopin and Lithium discontinued and will be tried on the mood stabilizer Lamictal. Patient was alert of risks and side effects of this medication, including Harvey Andreas Syndrome and patient was agreeable. Once it is clear patient tolerates her new medications and patient's  is contacted to assess for her functional status, patient is ready for discharge planning.    #MDD, recurrent  #LAUREN with panic attacks  - C/w Prozac 40 mg once daily  - C/w Lamictal Po 25 mg once daily  - Discontinue Lithium   - Discontinue Klonopin PO 0.5 mg once daily   - Hydroxyzine PO 50 mg PRN q6h for anxiety/ insomnia  - Discontinued home Cymbalta, Trazodone, and Abilify  - For acute agitation not amenable to verbal redirection give haldol 5mg po, Bendaryl 50mg, ativan 2 mg po q6 with escalation to IM if patient is danger to self/other; if IM formulation used please order repeat EKG to ensure qtc <500ms

## 2023-12-14 ENCOUNTER — OUTPATIENT (OUTPATIENT)
Dept: OUTPATIENT SERVICES | Facility: HOSPITAL | Age: 28
LOS: 1 days | End: 2023-12-14

## 2023-12-14 ENCOUNTER — APPOINTMENT (OUTPATIENT)
Dept: PSYCHIATRY | Facility: CLINIC | Age: 28
End: 2023-12-14

## 2023-12-14 VITALS
RESPIRATION RATE: 18 BRPM | TEMPERATURE: 96 F | DIASTOLIC BLOOD PRESSURE: 92 MMHG | SYSTOLIC BLOOD PRESSURE: 142 MMHG | HEART RATE: 86 BPM

## 2023-12-14 DIAGNOSIS — F41.1 GENERALIZED ANXIETY DISORDER: ICD-10-CM

## 2023-12-14 DIAGNOSIS — F31.81 BIPOLAR II DISORDER: ICD-10-CM

## 2023-12-14 RX ADMIN — Medication 40 MILLIGRAM(S): at 08:08

## 2023-12-14 RX ADMIN — LAMOTRIGINE 25 MILLIGRAM(S): 25 TABLET, ORALLY DISINTEGRATING ORAL at 08:09

## 2023-12-14 NOTE — REASON FOR VISIT
[FreeTextEntry4] : 4:20 [FreeTextEntry5] : 5:19PM [FreeTextEntry1] : Continuation of mental health treatment/ final session as part of pre-commitment phase of intensive DBT program.

## 2023-12-14 NOTE — PLAN
[FreeTextEntry2] : Individual's Overall Goal for Treatment (in patient's own words): "I would like to learn ways to get control over my anxiety to prevent emotional dysregulation/ psychiatric hospitalization."   Individual's Strengths and Skills Useful for Meeting Goals: adherent to treatment recommendations, Attempting to realize their potential, good impulse control, creative, motivated to participate in treatment, motivated and ready for change   Support Networks and/or Community Involvement: in good health, housing stability, English fluency, access to safe outdoor spaces, social supports   Barriers to treatment: "Nothing I am doing is working right now.". Problem/Need I:   Domain for Problem/Need I: Mental Health.   Problem: Bipolar 2, aspergers syndrom, personality disorder, panic disorder, insomnia due to mental condition.   Goal (In patient's words): To manage anxiety symptoms to decrease feelings of hopelessness, passive suicidal ideations, enhance quality of life and avoid psychiatric hospitalizations.   Status of Goal: Revised - Rationale:   Objective A: Mariela will utilize 2 skills/ strategies to support reduction of anxiety symptoms at least 1X/ day for the next 12 months.   Objective Target Date: 10/5/24   Status of Objective: Continued - Progress made:   Objective B: Mariela will take daily meds as prescribed and meet with psychiatrist/psychotherapist as scheduled.   Objective Target Date: 10/5/24   Status of Objective: Continued - Progress made:   Intervention(s): Therapist will utilize DBT skills. CBT techniques, psychoeducation, Motivational interviewing and Supportive therapy. Therapist will assess for changes in symptoms, including suicidal ideations/ self-harming urges and review/ revise safety plan as needed.   Medication Management: every 1 month or as needed . Dr. Harvey.   Individual Therapy: weekly or as indicated . Kaitlynn Campbell LCSW.  Problem/Need II: Domain for Problem/Need II: Mental Health. Goal (In patient's words): "Stress if often a trigger for me.". Status of Goal: Initial Patient will utilize distress tolerance skills in response to stress/ distress/ self-harming urges. Objective Target Date: 10/5/24 Status of Objective: Initial Objective B: Patient will learn / implement 3 crisis management skills Objective Target Date: 10/5/24 Intervention(s): - Clinician will provide DBT skills education, shaping, reinforcement on distress tolerance skills (ie: STOP, TIPP, ACCEPTS, IMPROVE, etc) to support moments of emotional intensity/ distress. -Clinician will educate skills group on understanding the role of mindfulness in crisis situations. - Clinician will provide education on mindfulness and provide examples of practice exercises to develop awareness of self in the present moment. - Clinician will provide a validating environment, promoting a non-judgmental atmosphere focusing on acceptance of self and toleration of emotional state to develop effective ways to manage self when in crisis. Individual Therapy: every 1 weeks . Kaitlynn Campbell LCSW. (Stress management/ toleration)    How will the Provider/Individual/Guardian know that care has been completed or that a transition to a different level of care change is warranted? Other rationale for transition/discharge: When patient no longer requires individual psychotherapy and medication in order to perform at baseline, the patient will be discharged.   Patient participated in creation of the treatment plan? Yes.   Family/Collateral participated in creation of the treatment plan? None - Reason others did not participate:  not clinically necessary.   Patient agrees with the treatment plan? Yes.   Plan reviewed by treatment team member: Psychiatric Provider/Prescriber and Therapist. [de-identified] : Therapist reviewed patient's completed diary card that was submitted by patient in advance of today's session, as planned. Therapist and patient reviewed diary card in detail, exploring factors contributing to patient's emotion/ urge ratings. Therapist worked with patient to identify specific distress tolerance skills she didn't give herself credit for utilizing; providing skills shaping/ reinforcing. Therapist utilized diary card review to help patient to connect the value of gaining skills to obtain "a life worth living." Therapist reviewed DBT client/ clinician contract with patient, answering questions and eliciting feedback/ response pertaining to commitment.  Patient presented to virtual session today as scheduled. She commented, "the diary card was fine" when asked how it went by therapist. She noted patterns of emotions, describing "feeling detached." She had difficulty identifying prompting event/ patterns pertaining to emotions. She noted the presence of vulnerabilities (i.e. migraine headache) and hoe this impacted mood. Patient rated intensity of depression and anxiety at 3.5/5. She reported the presence of passive suicidal ideations, adding "I haven't given much thought to it." She denied the presence of active suicidal ideations, plan, intent or safety concerns at the time of session.   Patient appeared actively engaged in session as DBT agreement was reviewed in detail. Patient was opting to make verbal agreement at the time of session but shared about difficulty making decision and wanting to discuss with her . She agreed to contact this therapist the following day to report decision outcome.

## 2023-12-14 NOTE — RISK ASSESSMENT
[FreeTextEntry8] : At time of session, patient continues to report the presence of passive suicidal ideations. She reported, "I haven't given (these thoughts) much thought." Patient denied the presence of active suicidal ideations, plan, intent or safety concerns. No self-harming urges/ action.  [Low acute suicide risk] : Low acute suicide risk [No] : No [Not clinically indicated] : Safety Plan completed/updated (for individuals at risk): Not clinically indicated

## 2023-12-15 DIAGNOSIS — F41.1 GENERALIZED ANXIETY DISORDER: ICD-10-CM

## 2023-12-15 DIAGNOSIS — F31.81 BIPOLAR II DISORDER: ICD-10-CM

## 2023-12-15 NOTE — END OF VISIT
[Licensed Clinician] : Licensed Clinician [FreeTextEntry1] : Patient presented to skills group today unexpectedly, just following IPP discharge earlier this morning. She did not present as in crisis/ emotionally dysregulated throughout group session. She left session with reminder of individual appointments (therapy and psychiatry) at clinic next week as part of discharge plan. Patient agreed to practice some mindfulness skills introduced in session to support effectiveness and control while at home after group.

## 2023-12-15 NOTE — DISCUSSION/SUMMARY
[Other: ___] : [unfilled] [Other: ____] : [unfilled] [Discussion with collaborating staff occurred since last visit.] : Discussion with collaborating staff occurred since last visit. [de-identified] : Dialectical Behavioral Therapy Skills Training Group [FreeTextEntry8] : Group leaders facilitated the DBT skills group in supportive, validating, non-judgmental setting. Conducted mindfulness exercise on coloring with non-dominant hand. Overview/ introduction to core mindfulness practice/ skills as Mindfulness handouts 1-4 were reviewed. This encompassed definition, goals and methods of mindfulness practice; obtaining feedback/ examples from participants. Provided education on wise mind skill, highlighting this as an example of 'walking the middle path.' Education was provided on core mindfulness 'what skills' (observe, describe and participate) and ideas of practicing were identified/ demonstrated. Homework was assigned with the goal of skills reinforcement.  [FreeTextEntry4] : Patient presented in-person for DBT skills group session today. She participated in mindfulness coloring exercise; reporting that her attention was fully on exercise and off of everything else. Patient reported feeling a sense of calm during practice, seeing value in mindfulness exercise. Patient shared examples/ feedback pertaining to mindfulness skills as they were reviewed/ introduced, identifying several ways to practice observe, describe, participate and wise mind. Patient appeared receptive to interventions utilized in today's skills group session.  [de-identified] : Patient will complete mindfulness worksheet #3: Wise Mind Practice Patient will complete mindfulness worksheet #4B: Observing, Describing, Participating Calendar Patient will continue participation in weekly DBT skills group sessions as a part of DBT program. Patient will continue to complete DBT diary card daily which will be reviewed weekly in individual session. Patient will outreach therapist for crisis skills coaching, if needed.  Patient will comply with medication as prescribed.

## 2023-12-17 DIAGNOSIS — F90.9 ATTENTION-DEFICIT HYPERACTIVITY DISORDER, UNSPECIFIED TYPE: ICD-10-CM

## 2023-12-17 DIAGNOSIS — Z91.52 PERSONAL HISTORY OF NONSUICIDAL SELF-HARM: ICD-10-CM

## 2023-12-17 DIAGNOSIS — Z11.52 ENCOUNTER FOR SCREENING FOR COVID-19: ICD-10-CM

## 2023-12-17 DIAGNOSIS — F12.90 CANNABIS USE, UNSPECIFIED, UNCOMPLICATED: ICD-10-CM

## 2023-12-17 DIAGNOSIS — G43.709 CHRONIC MIGRAINE WITHOUT AURA, NOT INTRACTABLE, WITHOUT STATUS MIGRAINOSUS: ICD-10-CM

## 2023-12-17 DIAGNOSIS — R45.851 SUICIDAL IDEATIONS: ICD-10-CM

## 2023-12-17 DIAGNOSIS — F41.0 PANIC DISORDER [EPISODIC PAROXYSMAL ANXIETY]: ICD-10-CM

## 2023-12-17 DIAGNOSIS — F33.2 MAJOR DEPRESSIVE DISORDER, RECURRENT SEVERE WITHOUT PSYCHOTIC FEATURES: ICD-10-CM

## 2023-12-17 DIAGNOSIS — G47.00 INSOMNIA, UNSPECIFIED: ICD-10-CM

## 2023-12-19 ENCOUNTER — APPOINTMENT (OUTPATIENT)
Dept: PSYCHIATRY | Facility: CLINIC | Age: 28
End: 2023-12-19
Payer: COMMERCIAL

## 2023-12-19 ENCOUNTER — OUTPATIENT (OUTPATIENT)
Dept: OUTPATIENT SERVICES | Facility: HOSPITAL | Age: 28
LOS: 1 days | End: 2023-12-19
Payer: COMMERCIAL

## 2023-12-19 DIAGNOSIS — F41.1 GENERALIZED ANXIETY DISORDER: ICD-10-CM

## 2023-12-19 DIAGNOSIS — G43.909 MIGRAINE, UNSPECIFIED, NOT INTRACTABLE, W/OUT STATUS MIGRAINOSUS: ICD-10-CM

## 2023-12-19 PROCEDURE — 99214 OFFICE O/P EST MOD 30 MIN: CPT

## 2023-12-19 RX ORDER — DULOXETINE HYDROCHLORIDE 20 MG/1
20 CAPSULE, DELAYED RELEASE PELLETS ORAL
Qty: 90 | Refills: 1 | Status: DISCONTINUED | COMMUNITY
Start: 2023-08-11 | End: 2023-12-19

## 2023-12-19 RX ORDER — DULOXETINE HYDROCHLORIDE 60 MG/1
60 CAPSULE, DELAYED RELEASE PELLETS ORAL
Qty: 30 | Refills: 1 | Status: DISCONTINUED | COMMUNITY
Start: 2023-10-11 | End: 2023-12-19

## 2023-12-19 RX ORDER — ARIPIPRAZOLE 5 MG/1
5 TABLET ORAL DAILY
Qty: 30 | Refills: 1 | Status: DISCONTINUED | COMMUNITY
Start: 2023-07-21 | End: 2023-12-19

## 2023-12-19 RX ORDER — TRAZODONE HYDROCHLORIDE 50 MG/1
50 TABLET ORAL
Qty: 90 | Refills: 3 | Status: DISCONTINUED | COMMUNITY
Start: 2023-03-02 | End: 2023-12-19

## 2023-12-19 RX ORDER — TOPIRAMATE 25 MG/1
25 TABLET, FILM COATED ORAL
Qty: 60 | Refills: 3 | Status: DISCONTINUED | COMMUNITY
Start: 2023-07-20 | End: 2023-12-19

## 2023-12-19 RX ORDER — DEXTROAMPHETAMINE SACCHARATE, AMPHETAMINE ASPARTATE MONOHYDRATE, DEXTROAMPHETAMINE SULFATE AND AMPHETAMINE SULFATE 2.5; 2.5; 2.5; 2.5 MG/1; MG/1; MG/1; MG/1
10 CAPSULE, EXTENDED RELEASE ORAL
Qty: 30 | Refills: 0 | Status: DISCONTINUED | COMMUNITY
Start: 2023-11-08 | End: 2023-12-19

## 2023-12-19 RX ORDER — TRAZODONE HYDROCHLORIDE 50 MG/1
50 TABLET ORAL
Qty: 90 | Refills: 3 | Status: DISCONTINUED | COMMUNITY
Start: 2023-10-11 | End: 2023-12-19

## 2023-12-19 NOTE — PLAN
[FreeTextEntry4] : Individual's Overall Goal for Treatment (in patient's own words): "I would like to learn ways to get control over my anxiety to prevent emotional dysregulation/ psychiatric hospitalization."   Individual's Strengths and Skills Useful for Meeting Goals: adherent to treatment recommendations, Attempting to realize their potential, good impulse control, creative, motivated to participate in treatment, motivated and ready for change   Support Networks and/or Community Involvement: in good health, housing stability, English fluency, access to safe outdoor spaces, social supports   Barriers to treatment: "Nothing I am doing is working right now.". Problem/Need I:   Domain for Problem/Need I: Mental Health.   Problem: Bipolar 2, aspergers syndrom, personality disorder, panic disorder, insomnia due to mental condition.   Goal (In patient's words): To manage anxiety symptoms to decrease feelings of hopelessness, passive suicidal ideations, enhance quality of life and avoid psychiatric hospitalizations.   Status of Goal: Revised - Rationale:   Objective A: Mariela will utilize 2 skills/ strategies to support reduction of anxiety symptoms at least 1X/ day for the next 12 months.   Objective Target Date: 10/5/24   Status of Objective: Continued - Progress made:   Objective B: Mariela will take daily meds as prescribed and meet with psychiatrist/psychotherapist as scheduled.   Objective Target Date: 10/5/24   Status of Objective: Continued - Progress made:   Intervention(s): Therapist will utilize DBT skills. CBT techniques, psychoeducation, Motivational interviewing and Supportive therapy. Therapist will assess for changes in symptoms, including suicidal ideations/ self-harming urges and review/ revise safety plan as needed.   Medication Management: every 1 month or as needed . Dr. Harvey.   Individual Therapy: weekly or as indicated . Kaitlynn Campbell LCSW. [FreeTextEntry5] : Continue Lamictal 25mg daily for for one week then increase to 50mg daily. Risks and benefits discussed including side effects Increase Prozac to 60mg daily to maximize antidepressant effect.  Continue DBT skills group Continue weekly therapy RTC in two weeks.
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
## 2023-12-19 NOTE — FAMILY HISTORY
[FreeTextEntry1] : Patient denied a family history of formal psychiatric treatment although she reports believing there are symptoms of mental health within her family

## 2023-12-20 ENCOUNTER — OUTPATIENT (OUTPATIENT)
Dept: OUTPATIENT SERVICES | Facility: HOSPITAL | Age: 28
LOS: 1 days | End: 2023-12-20
Payer: COMMERCIAL

## 2023-12-20 ENCOUNTER — APPOINTMENT (OUTPATIENT)
Dept: PSYCHIATRY | Facility: CLINIC | Age: 28
End: 2023-12-20

## 2023-12-20 DIAGNOSIS — F41.1 GENERALIZED ANXIETY DISORDER: ICD-10-CM

## 2023-12-20 PROCEDURE — 90834 PSYTX W PT 45 MINUTES: CPT

## 2023-12-20 NOTE — BH SOCIAL WORK CONFIRMATION FOLLOW UP NOTE - NSLINKEDTOLOC_PSY_ALL_CORE
Mariela attended her outpatient mental health appointment at [facility] as scheduled, . Mariela attended her outpatient mental health appointment at [facility] as scheduled, .

## 2023-12-21 ENCOUNTER — OUTPATIENT (OUTPATIENT)
Dept: OUTPATIENT SERVICES | Facility: HOSPITAL | Age: 28
LOS: 1 days | End: 2023-12-21
Payer: COMMERCIAL

## 2023-12-21 ENCOUNTER — APPOINTMENT (OUTPATIENT)
Dept: PSYCHIATRY | Facility: CLINIC | Age: 28
End: 2023-12-21

## 2023-12-21 DIAGNOSIS — F41.1 GENERALIZED ANXIETY DISORDER: ICD-10-CM

## 2023-12-21 DIAGNOSIS — F31.81 BIPOLAR II DISORDER: ICD-10-CM

## 2023-12-21 PROCEDURE — 90853 GROUP PSYCHOTHERAPY: CPT

## 2023-12-22 ENCOUNTER — APPOINTMENT (OUTPATIENT)
Dept: PSYCHIATRY | Facility: CLINIC | Age: 28
End: 2023-12-22

## 2023-12-22 DIAGNOSIS — F41.1 GENERALIZED ANXIETY DISORDER: ICD-10-CM

## 2023-12-22 DIAGNOSIS — F31.81 BIPOLAR II DISORDER: ICD-10-CM

## 2023-12-22 NOTE — DISCUSSION/SUMMARY
[Other: ___] : [unfilled] [Other: ____] : [unfilled] [Discussion with collaborating staff occurred since last visit.] : Discussion with collaborating staff occurred since last visit. [de-identified] : Dialectical Behavioral Therapy Skills Training Group [FreeTextEntry8] : Group leaders facilitated the DBT skills group in supportive, validating, non-judgmental setting. Conducted mindfulness exercise with focus on observation, description and participation in response to an object. Feeback from each group member was obtained. Group leaders reviewed examples of mindfulness practice during the week; highlighting benefits and providing skills shaping/ reinforcing. Leaders continued education on mindfulness 'how skills' (non-judgmentally, one mindfully, effectively), as feedback from group participants was obtained and examples provided to demonstrate skill use. Homework was assigned on this skill to reinforce these skills.     [FreeTextEntry4] : Patient presented for DBT skills group session in person today. She participated in mindfulness exercise, reporting being one-mindful as she observed/ described her object and mindfully listened as others shared facts about their objects. She was able to reinforce observe, describe, participate mindfulness skills by participating in exercise. Patient completed homework, sharing practice example of mindfulness and outcomes. Her example showed willingness to utilize skills. She noted feeling a sense of balance in wise mind, not giving up after not getting intended results with 1 method. She shared use of mindfulness what skills, not focusing on anxiety and gaining a sense of relief while mindfully crocheting. Patient appeared receptive to skills shaping and reinforcing provided. Patient appeared actively engaged as other participants examples were shared during homework review. She provided helpful feedback as mindfulness 'how' skills were reviewed, identifying ways to practice skills during the week ahead.  [de-identified] : Patient will complete mindfulness worksheet # 2A: Core Mindfulness Skills Practice  Patient will continue participation in weekly DBT skills group sessions as a part of DBT program. Patient will continue to complete DBT diary card daily which will be reviewed weekly in individual session. Patient will outreach therapist for crisis skills coaching, if needed.  Patient will comply with medication as prescribed.

## 2023-12-28 ENCOUNTER — APPOINTMENT (OUTPATIENT)
Dept: PSYCHIATRY | Facility: CLINIC | Age: 28
End: 2023-12-28

## 2023-12-28 NOTE — REASON FOR VISIT
[FreeTextEntry4] : 2:00PM [FreeTextEntry5] : 2:45PM [Patient] : Patient [Post-Hospitalization Visit] : This is a post-hospitalization visit [FreeTextEntry1] : Continuation of mental health treatment as new participant in intensive DBT program.

## 2023-12-28 NOTE — PHYSICAL EXAM
[Cooperative] : cooperative [Depressed] : depressed [Constricted] : constricted [Clear] : clear [Linear/Goal Directed] : linear/goal directed [WNL] : within normal limits [Average] : average [de-identified] : Not tearful  [de-identified] : fair [de-identified] : fair

## 2023-12-28 NOTE — PLAN
[FreeTextEntry2] : Individual's Overall Goal for Treatment (in patient's own words): "I would like to learn ways to get control over my anxiety to prevent emotional dysregulation/ psychiatric hospitalization."   Individual's Strengths and Skills Useful for Meeting Goals: adherent to treatment recommendations, Attempting to realize their potential, good impulse control, creative, motivated to participate in treatment, motivated and ready for change   Support Networks and/or Community Involvement: in good health, housing stability, English fluency, access to safe outdoor spaces, social supports   Barriers to treatment: "Nothing I am doing is working right now.". Problem/Need I:   Domain for Problem/Need I: Mental Health.   Problem: Bipolar 2, aspergers syndrom, personality disorder, panic disorder, insomnia due to mental condition.   Goal (In patient's words): To manage anxiety symptoms to decrease feelings of hopelessness, passive suicidal ideations, enhance quality of life and avoid psychiatric hospitalizations.   Status of Goal: Revised - Rationale:   Objective A: Mariela will utilize 2 skills/ strategies to support reduction of anxiety symptoms at least 1X/ day for the next 12 months.   Objective Target Date: 10/5/24   Status of Objective: Continued - Progress made:   Objective B: Mariela will take daily meds as prescribed and meet with psychiatrist/psychotherapist as scheduled.   Objective Target Date: 10/5/24   Status of Objective: Continued - Progress made:   Intervention(s): Therapist will utilize DBT skills. CBT techniques, psychoeducation, Motivational interviewing and Supportive therapy. Therapist will assess for changes in symptoms, including suicidal ideations/ self-harming urges and review/ revise safety plan as needed.   Medication Management: every 1 month or as needed . Dr. Harvey.   Individual Therapy: weekly or as indicated . Kaitlynn Campbell LCSW.  Problem/Need II: Domain for Problem/Need II: Mental Health. Goal (In patient's words): "Stress if often a trigger for me.". Status of Goal: Initial Patient will utilize distress tolerance skills in response to stress/ distress/ self-harming urges. Objective Target Date: 10/5/24 Status of Objective: Initial Objective B: Patient will learn / implement 3 crisis management skills Objective Target Date: 10/5/24 Intervention(s): - Clinician will provide DBT skills education, shaping, reinforcement on distress tolerance skills (ie: STOP, TIPP, ACCEPTS, IMPROVE, etc) to support moments of emotional intensity/ distress. -Clinician will educate skills group on understanding the role of mindfulness in crisis situations. - Clinician will provide education on mindfulness and provide examples of practice exercises to develop awareness of self in the present moment. - Clinician will provide a validating environment, promoting a non-judgmental atmosphere focusing on acceptance of self and toleration of emotional state to develop effective ways to manage self when in crisis. Individual Therapy: every 1 weeks . Kaitlynn Campbell LCSW. (Stress management/ toleration)    How will the Provider/Individual/Guardian know that care has been completed or that a transition to a different level of care change is warranted? Other rationale for transition/discharge: When patient no longer requires individual psychotherapy and medication in order to perform at baseline, the patient will be discharged.   Patient participated in creation of the treatment plan? Yes.   Family/Collateral participated in creation of the treatment plan? None - Reason others did not participate:  not clinically necessary.   Patient agrees with the treatment plan? Yes.   Plan reviewed by treatment team member: Psychiatric Provider/Prescriber and Therapist. [Dialectical Behavior Therapy] : Dialectical Behavior Therapy  [Psychoeducation] : Psychoeducation  [Skills training (all types)] : Skills training (all types)  [de-identified] : Session environment conducive to validating, non-judgmental setting. Therapist and patient reviewed patient's completed diary card provided prior to session. Therapist provided DBT skills shaping/ reinforcing on mindfulness skills. Therapist introduced behavioral chain analysis model, working with patient to identify series of events/ emotions that resulted in her decision to go to ED. Therapist showed understanding for patient's use of the hospital to maintain safety AND empowered her to the fact of skills that can be utilized to tolerate and aid in safety goals.   Patient presented to individual session today in-person, the first individual session since recent IPP discharge. Patient endorsed mood improvement, reporting that anxiety is "not as bad", rating intensity at 3/5 (5 is the highest). She noted that intensity of sadness is the same- 4/5. Suicidal ideation intensity rating is 2/5, much decreased from last session (5/5- that resulted in ED evaluation and IPP admission). She reported difficulty finding enjoyment in things. In exploration of factors that could support mood improvement, patient disclosed that she does not having typical daily routine. Ways to enhance/ structure routine were explored in support of improving emotions. Patient shared that she is looking forward to resuming college in the Spring semester, adding that this would add more structure to her day. Patient appeared receptive to therapist's thought challenging interventions, as patient reflected on spending time ruminating on ineffective thoughts while at home alone while her  is working. She was able to recognize that her current inability to work is not laziness or not wanting to contribute, she is in the process of waiting on documentation needed to work. Patient appeared receptive to this and verbalized that she is doing everything she can do in support of this goal.  In discussion pertaining to recent IPP hospitalization, patient worked with therapist to identify the "chain of events" that resulted in seeking hospitalization being the decision she made. She acknowledged the role of anxiety/ fear that led to ruminating about current stressors (in this case financial stressors/ being behind on rent/ fear or eviction), this resulted in feelings of worthlessness, which increased anxiety/ stress which led to "tired of life"/ suicidal thoughts. Patient reflected that her emotional intensity reduced while in the hospital, reflecting on this being a "much needed break." Therapist validated the value in this while getting patient to acknowledge that the hospitalization did not resolve any external stressors/ problems, but it did provide her a safe place and separation from them. She stated that while the hospital will remain a part of her safety plan, if needed; she would like to learn ways to manage herself/ tolerate outside of the needing to go to the hospital (patient has been in inpatient psychiatry unit 3 times since July 2023). Therapist made suggestion for patient to utilize mindfulness 'what' skills while crocheting. Safety plan reviewed in session and patient agreeable. Patient reported compliance with medication as prescribed.  [Recommended Frequency of Visits: ____] : Recommended frequency of visits: [unfilled] [Return in ____ week(s)] : Return in [unfilled] week(s) [FreeTextEntry1] : Patient will utilize mindfulness skills daily and agreed to try strategies while crocheting.  Patient will follow through on safety plan, if needed, as reviewed in session today.  Patient will continue participation in weekly DBT skills group sessions as a part of DBT program. Patient will continue to complete DBT diary card daily which will be reviewed weekly in individual session. Patient will outreach therapist for crisis skills coaching, if needed.  Patient will comply with medication as prescribed.

## 2023-12-28 NOTE — PHYSICAL EXAM
[Cooperative] : cooperative [Depressed] : depressed [Constricted] : constricted [Clear] : clear [Linear/Goal Directed] : linear/goal directed [WNL] : within normal limits [Average] : average [de-identified] : Not tearful  [de-identified] : fair [de-identified] : fair

## 2023-12-28 NOTE — PLAN
[FreeTextEntry2] : Individual's Overall Goal for Treatment (in patient's own words): "I would like to learn ways to get control over my anxiety to prevent emotional dysregulation/ psychiatric hospitalization."   Individual's Strengths and Skills Useful for Meeting Goals: adherent to treatment recommendations, Attempting to realize their potential, good impulse control, creative, motivated to participate in treatment, motivated and ready for change   Support Networks and/or Community Involvement: in good health, housing stability, English fluency, access to safe outdoor spaces, social supports   Barriers to treatment: "Nothing I am doing is working right now.". Problem/Need I:   Domain for Problem/Need I: Mental Health.   Problem: Bipolar 2, aspergers syndrom, personality disorder, panic disorder, insomnia due to mental condition.   Goal (In patient's words): To manage anxiety symptoms to decrease feelings of hopelessness, passive suicidal ideations, enhance quality of life and avoid psychiatric hospitalizations.   Status of Goal: Revised - Rationale:   Objective A: Mariela will utilize 2 skills/ strategies to support reduction of anxiety symptoms at least 1X/ day for the next 12 months.   Objective Target Date: 10/5/24   Status of Objective: Continued - Progress made:   Objective B: Mariela will take daily meds as prescribed and meet with psychiatrist/psychotherapist as scheduled.   Objective Target Date: 10/5/24   Status of Objective: Continued - Progress made:   Intervention(s): Therapist will utilize DBT skills. CBT techniques, psychoeducation, Motivational interviewing and Supportive therapy. Therapist will assess for changes in symptoms, including suicidal ideations/ self-harming urges and review/ revise safety plan as needed.   Medication Management: every 1 month or as needed . Dr. Harvey.   Individual Therapy: weekly or as indicated . Kaitlynn Campbell LCSW.  Problem/Need II: Domain for Problem/Need II: Mental Health. Goal (In patient's words): "Stress if often a trigger for me.". Status of Goal: Initial Patient will utilize distress tolerance skills in response to stress/ distress/ self-harming urges. Objective Target Date: 10/5/24 Status of Objective: Initial Objective B: Patient will learn / implement 3 crisis management skills Objective Target Date: 10/5/24 Intervention(s): - Clinician will provide DBT skills education, shaping, reinforcement on distress tolerance skills (ie: STOP, TIPP, ACCEPTS, IMPROVE, etc) to support moments of emotional intensity/ distress. -Clinician will educate skills group on understanding the role of mindfulness in crisis situations. - Clinician will provide education on mindfulness and provide examples of practice exercises to develop awareness of self in the present moment. - Clinician will provide a validating environment, promoting a non-judgmental atmosphere focusing on acceptance of self and toleration of emotional state to develop effective ways to manage self when in crisis. Individual Therapy: every 1 weeks . Kaitlynn Campbell LCSW. (Stress management/ toleration)    How will the Provider/Individual/Guardian know that care has been completed or that a transition to a different level of care change is warranted? Other rationale for transition/discharge: When patient no longer requires individual psychotherapy and medication in order to perform at baseline, the patient will be discharged.   Patient participated in creation of the treatment plan? Yes.   Family/Collateral participated in creation of the treatment plan? None - Reason others did not participate:  not clinically necessary.   Patient agrees with the treatment plan? Yes.   Plan reviewed by treatment team member: Psychiatric Provider/Prescriber and Therapist. [Dialectical Behavior Therapy] : Dialectical Behavior Therapy  [Psychoeducation] : Psychoeducation  [Skills training (all types)] : Skills training (all types)  [de-identified] : Session environment conducive to validating, non-judgmental setting. Therapist and patient reviewed patient's completed diary card provided prior to session. Therapist provided DBT skills shaping/ reinforcing on mindfulness skills. Therapist introduced behavioral chain analysis model, working with patient to identify series of events/ emotions that resulted in her decision to go to ED. Therapist showed understanding for patient's use of the hospital to maintain safety AND empowered her to the fact of skills that can be utilized to tolerate and aid in safety goals.   Patient presented to individual session today in-person, the first individual session since recent IPP discharge. Patient endorsed mood improvement, reporting that anxiety is "not as bad", rating intensity at 3/5 (5 is the highest). She noted that intensity of sadness is the same- 4/5. Suicidal ideation intensity rating is 2/5, much decreased from last session (5/5- that resulted in ED evaluation and IPP admission). She reported difficulty finding enjoyment in things. In exploration of factors that could support mood improvement, patient disclosed that she does not having typical daily routine. Ways to enhance/ structure routine were explored in support of improving emotions. Patient shared that she is looking forward to resuming college in the Spring semester, adding that this would add more structure to her day. Patient appeared receptive to therapist's thought challenging interventions, as patient reflected on spending time ruminating on ineffective thoughts while at home alone while her  is working. She was able to recognize that her current inability to work is not laziness or not wanting to contribute, she is in the process of waiting on documentation needed to work. Patient appeared receptive to this and verbalized that she is doing everything she can do in support of this goal.  In discussion pertaining to recent IPP hospitalization, patient worked with therapist to identify the "chain of events" that resulted in seeking hospitalization being the decision she made. She acknowledged the role of anxiety/ fear that led to ruminating about current stressors (in this case financial stressors/ being behind on rent/ fear or eviction), this resulted in feelings of worthlessness, which increased anxiety/ stress which led to "tired of life"/ suicidal thoughts. Patient reflected that her emotional intensity reduced while in the hospital, reflecting on this being a "much needed break." Therapist validated the value in this while getting patient to acknowledge that the hospitalization did not resolve any external stressors/ problems, but it did provide her a safe place and separation from them. She stated that while the hospital will remain a part of her safety plan, if needed; she would like to learn ways to manage herself/ tolerate outside of the needing to go to the hospital (patient has been in inpatient psychiatry unit 3 times since July 2023). Therapist made suggestion for patient to utilize mindfulness 'what' skills while crocheting. Safety plan reviewed in session and patient agreeable. Patient reported compliance with medication as prescribed.  [Recommended Frequency of Visits: ____] : Recommended frequency of visits: [unfilled] [Return in ____ week(s)] : Return in [unfilled] week(s) [FreeTextEntry1] : Patient will utilize mindfulness skills daily and agreed to try strategies while crocheting.  Patient will follow through on safety plan, if needed, as reviewed in session today.  Patient will continue participation in weekly DBT skills group sessions as a part of DBT program. Patient will continue to complete DBT diary card daily which will be reviewed weekly in individual session. Patient will outreach therapist for crisis skills coaching, if needed.  Patient will comply with medication as prescribed.

## 2023-12-29 ENCOUNTER — NON-APPOINTMENT (OUTPATIENT)
Age: 28
End: 2023-12-29

## 2023-12-29 ENCOUNTER — APPOINTMENT (OUTPATIENT)
Dept: PSYCHIATRY | Facility: CLINIC | Age: 28
End: 2023-12-29

## 2024-01-03 ENCOUNTER — OUTPATIENT (OUTPATIENT)
Dept: OUTPATIENT SERVICES | Facility: HOSPITAL | Age: 29
LOS: 1 days | End: 2024-01-03
Payer: COMMERCIAL

## 2024-01-03 ENCOUNTER — APPOINTMENT (OUTPATIENT)
Dept: PSYCHIATRY | Facility: CLINIC | Age: 29
End: 2024-01-03
Payer: COMMERCIAL

## 2024-01-03 DIAGNOSIS — F41.1 GENERALIZED ANXIETY DISORDER: ICD-10-CM

## 2024-01-03 DIAGNOSIS — F31.81 BIPOLAR II DISORDER: ICD-10-CM

## 2024-01-03 PROCEDURE — 99214 OFFICE O/P EST MOD 30 MIN: CPT

## 2024-01-03 PROCEDURE — 90834 PSYTX W PT 45 MINUTES: CPT

## 2024-01-03 PROCEDURE — 99213 OFFICE O/P EST LOW 20 MIN: CPT | Mod: 95

## 2024-01-03 RX ORDER — DULOXETINE HYDROCHLORIDE 30 MG/1
30 CAPSULE, DELAYED RELEASE PELLETS ORAL
Qty: 30 | Refills: 2 | Status: DISCONTINUED | COMMUNITY
Start: 2023-10-11 | End: 2024-01-03

## 2024-01-03 NOTE — PLAN
[FreeTextEntry4] : Individual's Overall Goal for Treatment (in patient's own words): "I would like to learn ways to get control over my anxiety to prevent emotional dysregulation/ psychiatric hospitalization."   Individual's Strengths and Skills Useful for Meeting Goals: adherent to treatment recommendations, Attempting to realize their potential, good impulse control, creative, motivated to participate in treatment, motivated and ready for change   Support Networks and/or Community Involvement: in good health, housing stability, English fluency, access to safe outdoor spaces, social supports   Barriers to treatment: "Nothing I am doing is working right now.". Problem/Need I:   Domain for Problem/Need I: Mental Health.   Problem: Bipolar 2, aspergers syndrom, personality disorder, panic disorder, insomnia due to mental condition.   Goal (In patient's words): To manage anxiety symptoms to decrease feelings of hopelessness, passive suicidal ideations, enhance quality of life and avoid psychiatric hospitalizations.   Status of Goal: Revised - Rationale:   Objective A: Mariela will utilize 2 skills/ strategies to support reduction of anxiety symptoms at least 1X/ day for the next 12 months.   Objective Target Date: 10/5/24   Status of Objective: Continued - Progress made:   Objective B: Mariela will take daily meds as prescribed and meet with psychiatrist/psychotherapist as scheduled.   Objective Target Date: 10/5/24   Status of Objective: Continued - Progress made:   Intervention(s): Therapist will utilize DBT skills. CBT techniques, psychoeducation, Motivational interviewing and Supportive therapy. Therapist will assess for changes in symptoms, including suicidal ideations/ self-harming urges and review/ revise safety plan as needed.   Medication Management: every 1 month or as needed . Dr. Harvey.   Individual Therapy: weekly or as indicated . Kaitlynn Campbell LCSW. [FreeTextEntry5] : Increase Lamictal to 50mg daily to 100mg daily for two weeks. Risks and benefits discussed including side effects Increase Prozac to 60mg daily to maximize antidepressant effect.  Continue DBT skills group Continue weekly therapy RTC in two weeks for further medication adjustment and Lamictal titration as tolerated.

## 2024-01-03 NOTE — DISCUSSION/SUMMARY
[FreeTextEntry1] : Mariela is a 28-year-old female who lives at [place].  She is currently laid taking a break from school but plans to restart next semester.  She was last employed at [employer] about 4 months ago.  She has a history of depression and anxiety along with significant trauma currently enrolled in DBT. Upon interview today she reports chronic symptoms of depression and chronic thoughts of suicide however she denied any acute safety concerns.  She denied any thoughts or intent to harm herself.  She reported anxiety and panic symptoms as her chief complaint.  Risks and benefits of treatment were discussed.
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
## 2024-01-04 ENCOUNTER — APPOINTMENT (OUTPATIENT)
Dept: PSYCHIATRY | Facility: CLINIC | Age: 29
End: 2024-01-04

## 2024-01-04 DIAGNOSIS — F31.81 BIPOLAR II DISORDER: ICD-10-CM

## 2024-01-04 DIAGNOSIS — F41.1 GENERALIZED ANXIETY DISORDER: ICD-10-CM

## 2024-01-09 ENCOUNTER — APPOINTMENT (OUTPATIENT)
Dept: ORTHOPEDIC SURGERY | Facility: CLINIC | Age: 29
End: 2024-01-09

## 2024-01-10 ENCOUNTER — OUTPATIENT (OUTPATIENT)
Dept: OUTPATIENT SERVICES | Facility: HOSPITAL | Age: 29
LOS: 1 days | End: 2024-01-10
Payer: COMMERCIAL

## 2024-01-10 ENCOUNTER — APPOINTMENT (OUTPATIENT)
Dept: PSYCHIATRY | Facility: CLINIC | Age: 29
End: 2024-01-10

## 2024-01-10 DIAGNOSIS — F31.81 BIPOLAR II DISORDER: ICD-10-CM

## 2024-01-10 DIAGNOSIS — F41.1 GENERALIZED ANXIETY DISORDER: ICD-10-CM

## 2024-01-10 PROCEDURE — 90834 PSYTX W PT 45 MINUTES: CPT

## 2024-01-10 NOTE — ADDENDUM
[FreeTextEntry1] : Treatment plan was updated following recent IPP hospitalization to reflect participation in intensive DBT program. Patient continues to struggle with managing unwanted thoughts/ emotions, which often result in crisis. Patient will benefit from DBT skills to manage her unwanted thoughts, emotions, urges/ behaviors. Patient cited goal of learning/ implementing more effective ways to cope with crisis, as opposed to relying on the hospital to give her a "break" from life.

## 2024-01-10 NOTE — DISCUSSION/SUMMARY
[Plan Revision] : Plan Revision [Adherent to treatment recommendations] : adherent to treatment recommendations [Attempting to realize their potential] : Attempting to realize their potential [Good impulse control] : good impulse control [Creative] : creative [Motivated to participate in treatment] : motivated to participate in treatment [Motivated and ready for change] : motivated and ready for change [In good health] : in good health [Housing stability] : housing stability [English fluency] : English fluency [Access to safe outdoor spaces] : access to safe outdoor spaces [Social supports] : social supports [Discontinued - Reason] : Discontinued - Reason: [Continued - Progress made] : Continued - Progress made: [None - Reason others did not participate:] : None - Reason others did not participate:  [Yes] : Yes [Psychiatric Provider/Prescriber] : Psychiatric Provider/Prescriber [Therapist] : Therapist [Mental Health] : Mental Health [Revised - Rationale] : Revised - Rationale: [___ times a week] : [unfilled] times a week [Other: ___] : [unfilled] [Improvement in symptoms as evidenced by:] : Improvement in symptoms as evidenced by: [Other rationale for transition/discharge:] : Other rationale for transition/discharge: [FreeTextEntry2] : 12/20/24 [FreeTextEntry3] : 7/21/23 [FreeTextEntry8] : "Nothing I am doing is working right now." [de-identified] : Dr. Orourke  [FreeTextEntry1] : Inability to tolerate intense emotions/ crisis.  [de-identified] : Mariela will contact therapist if emotional intensity is rated at a 3 or higher for skills coaching.   [de-identified] : 12/20/24 [de-identified] : Goal slightly revised to capture specific objective pertaining to intensive DBT program.  [FreeTextEntry4] : "My thoughts are often in the past or the future." [de-identified] : New problem/ goal more specific to DBT program.  [de-identified] : Mariela will implement mindfulness skills/ practice daily.  [de-identified] : 12/20/24 [de-identified] : Patient will benefit from learning/ implementing skills to promote being in the present moment.  [FreeTextEntry5] : -Clinician will provide education on DBT mindfulness skills, connecting their value to patient's life.  -Clinician will provide shaping, reinforcing for mindfulness skills in in individual sessions.  -Clinician will explore examples of mindfulness skills practice along with outcomes.   [de-identified] : NELSON RodriguezW [de-identified] : Kaitlynn Campbell LCSW/ Jorge Luis Lua LMSW [de-identified] : Upon completion of at least 90 % attendance of weekly skills group and individual treatment, client will have:       -Have attained and are utilizing a skill set to manage self-injurious/ target behaviors.       -An average distress tolerance level on weekly diary card at 2 or below.       -Improvement in interpersonal effectiveness skills as monitored by self-report and observed by DBT team.   [de-identified] : Upon completion of agreement for DBT program which entails Skills Group and Individual Therapy, patient will be evaluated for necessity of services and/or referral to outside services.      [de-identified] : not clinically indicated

## 2024-01-10 NOTE — DISCUSSION/SUMMARY
[Plan Revision] : Plan Revision [Adherent to treatment recommendations] : adherent to treatment recommendations [Attempting to realize their potential] : Attempting to realize their potential [Good impulse control] : good impulse control [Creative] : creative [Motivated to participate in treatment] : motivated to participate in treatment [Motivated and ready for change] : motivated and ready for change [In good health] : in good health [Housing stability] : housing stability [English fluency] : English fluency [Access to safe outdoor spaces] : access to safe outdoor spaces [Social supports] : social supports [Discontinued - Reason] : Discontinued - Reason: [Continued - Progress made] : Continued - Progress made: [None - Reason others did not participate:] : None - Reason others did not participate:  [Yes] : Yes [Psychiatric Provider/Prescriber] : Psychiatric Provider/Prescriber [Therapist] : Therapist [Mental Health] : Mental Health [Revised - Rationale] : Revised - Rationale: [___ times a week] : [unfilled] times a week [Other: ___] : [unfilled] [Improvement in symptoms as evidenced by:] : Improvement in symptoms as evidenced by: [Other rationale for transition/discharge:] : Other rationale for transition/discharge: [FreeTextEntry2] : 12/20/24 [FreeTextEntry3] : 7/21/23 [FreeTextEntry8] : "Nothing I am doing is working right now." [de-identified] : Dr. Orourke  [FreeTextEntry1] : Inability to tolerate intense emotions/ crisis.  [de-identified] : Mariela will contact therapist if emotional intensity is rated at a 3 or higher for skills coaching.   [de-identified] : 12/20/24 [de-identified] : Goal slightly revised to capture specific objective pertaining to intensive DBT program.  [FreeTextEntry4] : "My thoughts are often in the past or the future." [de-identified] : New problem/ goal more specific to DBT program.  [de-identified] : Mariela will implement mindfulness skills/ practice daily.  [de-identified] : 12/20/24 [de-identified] : Patient will benefit from learning/ implementing skills to promote being in the present moment.  [FreeTextEntry5] : -Clinician will provide education on DBT mindfulness skills, connecting their value to patient's life.  -Clinician will provide shaping, reinforcing for mindfulness skills in in individual sessions.  -Clinician will explore examples of mindfulness skills practice along with outcomes.   [de-identified] : NELSON RodriguezW [de-identified] : Kaitylnn Campbell LCSW/ Jorge Luis Lua LMSW [de-identified] : Upon completion of at least 90 % attendance of weekly skills group and individual treatment, client will have:       -Have attained and are utilizing a skill set to manage self-injurious/ target behaviors.       -An average distress tolerance level on weekly diary card at 2 or below.       -Improvement in interpersonal effectiveness skills as monitored by self-report and observed by DBT team.   [de-identified] : Upon completion of agreement for DBT program which entails Skills Group and Individual Therapy, patient will be evaluated for necessity of services and/or referral to outside services.      [de-identified] : not clinically indicated

## 2024-01-10 NOTE — RISK ASSESSMENT
[Yes, patient reports ideation or behavior] : Yes, patient reports ideation or behavior [No, patient denies ideation or behavior] : No, patient denies ideation or behavior [Yes] : Yes [No] : No [FreeTextEntry8] :  Patient reported that passive suicidal ideations have decreased in intensity/ frequency. She denied the presence of active suicidal ideations, plan, intent or safety concerns.   [Moderate acute suicide risk] : Moderate acute suicide risk [FreeTextEntry2] : due to recent psychiatric hospitalization  [FreeTextEntry3] : Safety plan reviewed/ revised in session see (see safety plan).

## 2024-01-11 DIAGNOSIS — F31.81 BIPOLAR II DISORDER: ICD-10-CM

## 2024-01-11 DIAGNOSIS — F41.1 GENERALIZED ANXIETY DISORDER: ICD-10-CM

## 2024-01-16 NOTE — PLAN
[Dialectical Behavior Therapy] : Dialectical Behavior Therapy  [Psychoeducation] : Psychoeducation  [Skills training (all types)] : Skills training (all types)  [FreeTextEntry2] : Individual's Overall Goal for Treatment (in patient's own words): "I would like to learn ways to get control over my anxiety to prevent emotional dysregulation/ psychiatric hospitalization."   Individual's Strengths and Skills Useful for Meeting Goals: adherent to treatment recommendations, Attempting to realize their potential, good impulse control, creative, motivated to participate in treatment, motivated and ready for change   Support Networks and/or Community Involvement: in good health, housing stability, English fluency, access to safe outdoor spaces, social supports   Barriers to treatment: "Nothing I am doing is working right now.". Problem/Need I:   Domain for Problem/Need I: Mental Health.   Problem: Bipolar 2, aspergers syndrom, personality disorder, panic disorder, insomnia due to mental condition.   Goal (In patient's words): To manage anxiety symptoms to decrease feelings of hopelessness, passive suicidal ideations, enhance quality of life and avoid psychiatric hospitalizations.   Status of Goal: Continued - Progress made: Patient identified the goal as unchanged following recent IPP hospitalization.   Objective A: Mariela will utilize 2 skills/ strategies to support reduction of anxiety symptoms at least 1X/ day for the next 12 months.   Objective Target Date: 12/20/24   Status of Objective: Discontinued - Reason: New objectives to reflect patient's decision to participate in the intensive DBT program. (See New problem/ need 3).   Objective B: Mariela will take daily meds as prescribed and meet with psychiatrist/psychotherapist as scheduled.   Objective Target Date: 12/20/24   Status of Objective: Continued - Progress made: Patient will remain compliant with medication as prescribed.   Intervention(s): Therapist will utilize DBT skills. CBT techniques, psychoeducation, Motivational interviewing and Supportive therapy. Therapist will assess for changes in symptoms, including suicidal ideations/ self-harming urges and review/ revise safety plan as needed. Patient will attend monthly (or as indicated) medication management appointments. Patient will take medications as prescribed.   Medication Management: every 1 month or as needed . Dr. Orourke.   Individual Therapy: weekly or as indicated . Kaitlynn Campbell LCSW.  Problem/Need II: Domain for Problem/Need II: Mental Health. Problem: Inability to tolerate intense emotions/ crisis. Goal (In patient's words): "Stress if often a trigger for me.". Status of Goal: Revised - Rationale: Goal slightly revised to capture specific objective pertaining to intensive DBT program. Objective A: Mariela will learn/ implement 3 skills to tolerate unwanted emotions/ urges to prevent emotional intensity from increasing/ mitigate crisis. Objective Target Date: 12/20/24 Status of Objective: Revised - Rationale: Goal slightly revised to capture specific objective pertaining to intensive DBT program. Objective B: Mariela will contact therapist if emotional intensity is rated at a 3 or higher for skills coaching. Objective Target Date: 12/20/24 Status of Objective: Revised - Rationale: Intervention(s): - Clinician will provide DBT skills education, shaping, reinforcement on distress tolerance skills (ie: STOP, TIPP, ACCEPTS, IMPROVE, etc) to support moments of emotional intensity/ distress. -Clinician will educate skills group on understanding the role of mindfulness in crisis situations. - Clinician will provide education on mindfulness and provide examples of practice exercises to develop awareness of self in the present moment. - Clinician will provide a validating environment, promoting a non-judgmental atmosphere focusing on acceptance of self and toleration of emotional state to develop effective ways to manage self when in crisis. -Clinician will offer/ encourage use of skills/ crisis coaching when level of emotional intensity exceeds a 3. -Clinician will provide skills/ crisis coaching with emphasis on identification/ reinforcement of distress tolerance skills in the moment to assist with goal of toleration/ management of self. Individual Therapy: 1-2X weekly . Kaitlynn Campbell LCSW. Group Therapy: weekly for duration of intensive DBT program . Kaitlynn Campbell LCSW / Jorge Luis Lua LMSW. (Stress management/ toleration)  Problem/Need III: Domain for Problem/Need III: Mental Health. Goal (In patient's words): "My thoughts are often in the past or the future.". Status of Goal: Revised - Rationale: New problem/ goal more specific to DBT program. Objective A: Mariela will implement mindfulness skills/ practice daily. Objective Target Date: 12/20/24 Status of Objective: Revised - Rationale: Patient will benefit from learning/ implementing skills to promote being in the present moment. Intervention(s): -Clinician will provide education on DBT mindfulness skills, connecting their value to patient's life. -Clinician will provide shaping, reinforcing for mindfulness skills in in individual sessions. -Clinician will explore examples of mindfulness skills practice along with outcomes. Individual Therapy: 1-2X times a week . Kaitlynn Campbell LCSW. Group Therapy: weekly for duration of intensive DBT program . Kaitlynn Campbell LCSW/ Jorge Luis Lua LMSW. [de-identified] : "I didn't do any skills this week." Session environment conducive to validating, non-judgmental setting. Therapist addressed therapy-interfering behavior (patient missed DBT skills training group last week), exploring factors and assisting patient in problem-solving. Therapist provided DBT skills shaping/ reinforcing on mindfulness skills, working with patient to (non- judgmentally) identify situations where skills could have been utilized.   Patient requested that appointment be transitioned to virtual ahead of today's session time. Patient was involved in discussion pertaining to therapy interfering behavior (patient did not show for DBT skills training group last week). Time in session was focused on addressing this along with factors and working to help patient to problem-solve to prevent unexcused absences. Patient shared about financial difficulties, sharing that she had no way of paying for bus fare to get to clinic for group last week and she avoided addressing this due to feelings of shame. Patient appeared receptive to therapist's validation and efforts to help her problem solve. Patient does not have Medicaid and therefore does not qualify for [employer] or Medicaid Transportation Services. Therapist found and shared resource/ application to reduce cost of [employer] based on income, but this would likely take time before reviewed/ approved (next group session is tomorrow). Patient shared that in 1-2 weeks she is expecting to receive [employer] from [place], noting that this will help her get to to and from clinic appointments more easily. She shared the want to come to group tomorrow, denying any other factors that resulted in her not attending, but also reported having no means to get there. Patient rated emotional intensity/ urge levels this week as follows:  depression 2/5 (same as last week), anxiety 4/5 (same as last week), suicidal ideations 2/5 ("a little less than last week", reporting these thoughts as "manageable"), self-harming urges 0/5. Patient denied the presence of active suicidal ideations, plan, intent or safety concerns. Patient reported, "I could have used skills a lot this week", sharing insights into situations that skills could have been helpful. She appeared receptive to therapist's modeling/ shaping of non-judgmental stance skill in response to this. She specifically saw value in use of mindfulness/ wise mind skill along with use of distraction techniques to help manage ruminating thoughts last night that interfered with sleep. Patient was also able to see her reaching out and keeping today's appointment (as opposed to missing) as effective/ skillful choice. Patient reported compliance with medication as prescribed. Therapist provided patient list of food pantries as requested by patient. [Recommended Frequency of Visits: ____] : Recommended frequency of visits: [unfilled] [Return in ____ week(s)] : Return in [unfilled] week(s) [FreeTextEntry1] : Patient will resume use of DBT skills, including daily mindfulness practice. Patient will continue participation in weekly DBT skills group sessions as a part of DBT program. Patient will continue to complete DBT diary card daily which will be reviewed weekly in individual session. Patient will outreach therapist for crisis skills coaching, if needed.  Patient will comply with medication as prescribed.

## 2024-01-16 NOTE — RISK ASSESSMENT
[Yes, patient reports ideation or behavior] : Yes, patient reports ideation or behavior [No, patient denies ideation or behavior] : No, patient denies ideation or behavior [No] : No [Identifies reasons for living] : identifies reasons for living [Supportive social network of family or friends] : supportive social network of family or friends [Positive therapeutic relationships] : positive therapeutic relationships [Responsibility to children, family, or others] : responsibility to children, family, or others [Engaged in work or school] : engaged in work or school [Low acute suicide risk] : Low acute suicide risk [Yes] : Yes [Not clinically indicated] : Safety Plan completed/updated (for individuals at risk): Not clinically indicated [FreeTextEntry8] : At time of session, patient rated suicidal ideations 2/5 ("a little less than last week", reporting these thoughts as "manageable". She rated self-harming urges 0/5. Patient denied the presence of active suicidal ideations, plan, intent or safety concerns.  [FreeTextEntry3] : Safety plan reviewed. Patient remains able to participate in safety planning.

## 2024-01-16 NOTE — REASON FOR VISIT
[Patient preference] : as per patient preference [Telehealth (audio & video) - Individual/Group] : This visit was provided via telehealth using real-time 2-way audio visual technology. [Medical Office: ([facility])___] : The provider was located at the medical office in [unfilled]. [Home] : The patient, [unfilled], was located at home, [unfilled], at the time of the visit. [Verbal consent obtained from patient/other participant(s)] : Verbal consent for telehealth/telephonic services obtained from patient/other participant(s) [Patient] : Patient [FreeTextEntry4] : 2:04PM [FreeTextEntry5] : 2:49PM [FreeTextEntry1] : Continuation of mental health treatment as participant in intensive DBT program.

## 2024-01-17 ENCOUNTER — APPOINTMENT (OUTPATIENT)
Dept: PSYCHIATRY | Facility: CLINIC | Age: 29
End: 2024-01-17
Payer: COMMERCIAL

## 2024-01-17 ENCOUNTER — OUTPATIENT (OUTPATIENT)
Dept: OUTPATIENT SERVICES | Facility: HOSPITAL | Age: 29
LOS: 1 days | End: 2024-01-17
Payer: COMMERCIAL

## 2024-01-17 DIAGNOSIS — F41.1 GENERALIZED ANXIETY DISORDER: ICD-10-CM

## 2024-01-17 DIAGNOSIS — F60.9 PERSONALITY DISORDER, UNSPECIFIED: ICD-10-CM

## 2024-01-17 DIAGNOSIS — F31.81 BIPOLAR II DISORDER: ICD-10-CM

## 2024-01-17 PROCEDURE — 99214 OFFICE O/P EST MOD 30 MIN: CPT | Mod: 95

## 2024-01-17 PROCEDURE — 90834 PSYTX W PT 45 MINUTES: CPT

## 2024-01-17 RX ORDER — MAGNESIUM OXIDE 241.3 MG/1000MG
400 TABLET ORAL DAILY
Qty: 30 | Refills: 5 | Status: DISCONTINUED | COMMUNITY
Start: 2022-06-07 | End: 2024-01-17

## 2024-01-17 RX ORDER — RIBOFLAVIN (VITAMIN B2) 400 MG
400 TABLET ORAL
Qty: 30 | Refills: 11 | Status: DISCONTINUED | COMMUNITY
Start: 2022-06-07 | End: 2024-01-17

## 2024-01-17 RX ORDER — HYDROXYZINE HYDROCHLORIDE 50 MG/1
50 TABLET ORAL
Qty: 60 | Refills: 0 | Status: ACTIVE | COMMUNITY
Start: 2024-01-17 | End: 1900-01-01

## 2024-01-17 RX ORDER — UBROGEPANT 100 MG/1
100 TABLET ORAL
Qty: 8 | Refills: 3 | Status: DISCONTINUED | COMMUNITY
Start: 2023-06-15 | End: 2024-01-17

## 2024-01-17 RX ORDER — PREDNISONE 20 MG/1
20 TABLET ORAL
Qty: 16 | Refills: 0 | Status: DISCONTINUED | COMMUNITY
Start: 2022-06-07 | End: 2024-01-17

## 2024-01-17 RX ORDER — SUMATRIPTAN 100 MG/1
100 TABLET, FILM COATED ORAL
Qty: 14 | Refills: 5 | Status: DISCONTINUED | COMMUNITY
Start: 2022-06-07 | End: 2024-01-17

## 2024-01-17 RX ORDER — TRAMADOL HYDROCHLORIDE 50 MG/1
50 TABLET, COATED ORAL
Qty: 21 | Refills: 0 | Status: DISCONTINUED | COMMUNITY
Start: 2023-08-17 | End: 2024-01-17

## 2024-01-17 RX ORDER — GALCANEZUMAB 120 MG/ML
120 INJECTION, SOLUTION SUBCUTANEOUS
Qty: 1 | Refills: 5 | Status: DISCONTINUED | COMMUNITY
Start: 2023-03-02 | End: 2024-01-17

## 2024-01-18 ENCOUNTER — APPOINTMENT (OUTPATIENT)
Dept: NEUROLOGY | Facility: CLINIC | Age: 29
End: 2024-01-18

## 2024-01-18 DIAGNOSIS — F41.1 GENERALIZED ANXIETY DISORDER: ICD-10-CM

## 2024-01-18 DIAGNOSIS — F31.81 BIPOLAR II DISORDER: ICD-10-CM

## 2024-01-18 DIAGNOSIS — F60.9 PERSONALITY DISORDER, UNSPECIFIED: ICD-10-CM

## 2024-01-18 NOTE — PLAN
[Dialectical Behavior Therapy] : Dialectical Behavior Therapy  [Psychoeducation] : Psychoeducation  [Skills training (all types)] : Skills training (all types)  [FreeTextEntry2] : Individual's Overall Goal for Treatment (in patient's own words): "I would like to learn ways to get control over my anxiety to prevent emotional dysregulation/ psychiatric hospitalization."   Individual's Strengths and Skills Useful for Meeting Goals: adherent to treatment recommendations, Attempting to realize their potential, good impulse control, creative, motivated to participate in treatment, motivated and ready for change   Support Networks and/or Community Involvement: in good health, housing stability, English fluency, access to safe outdoor spaces, social supports   Barriers to treatment: "Nothing I am doing is working right now.". Problem/Need I:   Domain for Problem/Need I: Mental Health.   Problem: Bipolar 2, aspergers syndrom, personality disorder, panic disorder, insomnia due to mental condition.   Goal (In patient's words): To manage anxiety symptoms to decrease feelings of hopelessness, passive suicidal ideations, enhance quality of life and avoid psychiatric hospitalizations.   Status of Goal: Continued - Progress made: Patient identified the goal as unchanged following recent IPP hospitalization.   Objective A: Mariela will utilize 2 skills/ strategies to support reduction of anxiety symptoms at least 1X/ day for the next 12 months.   Objective Target Date: 12/20/24   Status of Objective: Discontinued - Reason: New objectives to reflect patient's decision to participate in the intensive DBT program. (See New problem/ need 3).   Objective B: Mariela will take daily meds as prescribed and meet with psychiatrist/psychotherapist as scheduled.   Objective Target Date: 12/20/24   Status of Objective: Continued - Progress made: Patient will remain compliant with medication as prescribed.   Intervention(s): Therapist will utilize DBT skills. CBT techniques, psychoeducation, Motivational interviewing and Supportive therapy. Therapist will assess for changes in symptoms, including suicidal ideations/ self-harming urges and review/ revise safety plan as needed. Patient will attend monthly (or as indicated) medication management appointments. Patient will take medications as prescribed.   Medication Management: every 1 month or as needed . Dr. Orourke.   Individual Therapy: weekly or as indicated . Kaitlynn Campbell LCSW.  Problem/Need II: Domain for Problem/Need II: Mental Health. Problem: Inability to tolerate intense emotions/ crisis. Goal (In patient's words): "Stress if often a trigger for me.". Status of Goal: Revised - Rationale: Goal slightly revised to capture specific objective pertaining to intensive DBT program. Objective A: Mariela will learn/ implement 3 skills to tolerate unwanted emotions/ urges to prevent emotional intensity from increasing/ mitigate crisis. Objective Target Date: 12/20/24 Status of Objective: Revised - Rationale: Goal slightly revised to capture specific objective pertaining to intensive DBT program. Objective B: Mariela will contact therapist if emotional intensity is rated at a 3 or higher for skills coaching. Objective Target Date: 12/20/24 Status of Objective: Revised - Rationale: Intervention(s): - Clinician will provide DBT skills education, shaping, reinforcement on distress tolerance skills (ie: STOP, TIPP, ACCEPTS, IMPROVE, etc) to support moments of emotional intensity/ distress. -Clinician will educate skills group on understanding the role of mindfulness in crisis situations. - Clinician will provide education on mindfulness and provide examples of practice exercises to develop awareness of self in the present moment. - Clinician will provide a validating environment, promoting a non-judgmental atmosphere focusing on acceptance of self and toleration of emotional state to develop effective ways to manage self when in crisis. -Clinician will offer/ encourage use of skills/ crisis coaching when level of emotional intensity exceeds a 3. -Clinician will provide skills/ crisis coaching with emphasis on identification/ reinforcement of distress tolerance skills in the moment to assist with goal of toleration/ management of self. Individual Therapy: 1-2X weekly . Kaitlynn Campbell LCSW. Group Therapy: weekly for duration of intensive DBT program . Kaitlynn Campbell LCSW / Jorge Lusi Lua LMSW. (Stress management/ toleration)  Problem/Need III: Domain for Problem/Need III: Mental Health. Goal (In patient's words): "My thoughts are often in the past or the future.". Status of Goal: Revised - Rationale: New problem/ goal more specific to DBT program. Objective A: Mariela will implement mindfulness skills/ practice daily. Objective Target Date: 12/20/24 Status of Objective: Revised - Rationale: Patient will benefit from learning/ implementing skills to promote being in the present moment. Intervention(s): -Clinician will provide education on DBT mindfulness skills, connecting their value to patient's life. -Clinician will provide shaping, reinforcing for mindfulness skills in in individual sessions. -Clinician will explore examples of mindfulness skills practice along with outcomes. Individual Therapy: 1-2X times a week . Kaitlynn Campbell LCSW. Group Therapy: weekly for duration of intensive DBT program . Kaitlynn Campbell LCSW/ Jorge Luis Lua LMSW. [de-identified] : Patient presented to session today virtually as scheduled. She reported, "I am trying to use skills more." She shared specific examples of wise mind skills/ practice, utilizing 'a stone on a lake' visualization and 'asking wise mind a question' strategies. Patient reported that use of wise mind practice has resulted in decreasing emotional state and resulting in a clearer mind, noting this as helpful. She denied any crisis this week or any events prompting increase in emotional intensity. Patient was able to see value of mindfulness skills in response to future-based catastrophic thoughts. Patient acknowledged this thought pattern as ineffective, adding "there is nothing I can do right now to solve the problem." Therapist provided direct skills connection/ shaping with non-judgmental stance skill. Patient reported that she is better able to catch and label thoughts as judgmental but still struggling to reframe/ challenge them. Patient was active participant in session, working to identify several facts that oppose patient's judgmental statement "I'm useless." Patient expressed this was helpful.  Patient rated the following, demonstrating progress: depression- 2/5 anxiety- 2/5 (decreased) S/I- 0.5/5 (decreased)  SIB- 0/5 (patient denied any self-harming urges/ behavior).  [Recommended Frequency of Visits: ____] : Recommended frequency of visits: [unfilled] [Return in ____ week(s)] : Return in [unfilled] week(s) [FreeTextEntry1] : Goal- daily skills reinforcement  Patient will complete mindfulness worksheet #2 Patient will continue participation in weekly DBT skills group sessions as a part of DBT program. Patient will continue to complete DBT diary card daily which will be reviewed weekly in individual session. Patient will outreach therapist for crisis skills coaching, if needed.  Patient will comply with medication as prescribed.  Patient continues to note impact of financial stressors, which has also impacted her treatment. She shared that her/ her 's finances are "in the red" and that she is lacking funds to afford transportation costs ($2.90 for [employer]). Patient will be obtaining a [employer] from college (she is resuming next week) and plans to utilize this to support goal of appointment attendance/ eliminating therapy-interfering behaviors.

## 2024-01-18 NOTE — BH INPATIENT PSYCHIATRY ASSESSMENT NOTE - NS ED [facility] REVIEW OF ED CHART AVAILABLE LABS REVIEWED
Assessment and Plan:    SAUNDRA HOLMAN is a 66yo M w PMH of CAD (x2 stents Mar 2023), HLD, T2DM, hx of kidney stones, psoriasis who presented with an oral mass and underwent L hemimandibulectomy, SND L level 1-3, recon with L FFF, STSG, and placement of dental implants on 12/7. He had a trach which was subsequently decannulated. Now s/p OR for debridement and exploration. Trach replaced through prior stoma for pulmonary toilet. Pt with reported intermittent tremors 1/7, electrolytes normal.     Plan:  - SLP continue sips of water   - Q4 flap checks - end  today - will cut doppler   - Hgb goal > 9.0   - C/w TF  - Hold home plavix  - Maintain 7.5 portex for pulm toilet  - ISS  - Pain control  - Home meds  - Bowel regimen    [facility] at [phone] with any questions/concerns.    Ada Quinones PA-C  01-18-24 @ 07:42 Yes

## 2024-01-18 NOTE — REASON FOR VISIT
[Patient preference] : as per patient preference [Telehealth (audio & video) - Individual/Group] : This visit was provided via telehealth using real-time 2-way audio visual technology. [Medical Office: ([facility])___] : The provider was located at the medical office in [unfilled]. [Home] : The patient, [unfilled], was located at home, [unfilled], at the time of the visit. [Verbal consent obtained from patient/other participant(s)] : Verbal consent for telehealth/telephonic services obtained from patient/other participant(s) [Patient] : Patient [FreeTextEntry4] : 2:00PM [FreeTextEntry5] : 2:44PM [FreeTextEntry1] : Continuation of mental health treatment as participant in intensive DBT program.

## 2024-01-18 NOTE — RISK ASSESSMENT
[No, patient denies ideation or behavior] : No, patient denies ideation or behavior [No] : No [Low acute suicide risk] : Low acute suicide risk [Yes] : Safety Plan completed/updated (for individuals at risk): Yes [FreeTextEntry8] : Patient rated intensity of suicidal ideations this week as a 0.5/5, noting a decrease in these thoughts. She reported that suicidal ideations have been "absent more than present" this week. At time of session, patient denied the presence of suicidal ideations (passive or active), plan, intent or safety concerns.  [FreeTextEntry3] : Safety plan reviewed in session.

## 2024-01-18 NOTE — PHYSICAL EXAM
[Cooperative] : cooperative [Clear] : clear [Linear/Goal Directed] : linear/goal directed [WNL] : within normal limits [Average] : average [FreeTextEntry8] : Less depressed [de-identified] : Brighter [de-identified] : fair [de-identified] : fair

## 2024-01-23 ENCOUNTER — APPOINTMENT (OUTPATIENT)
Dept: PSYCHIATRY | Facility: CLINIC | Age: 29
End: 2024-01-23

## 2024-01-25 ENCOUNTER — APPOINTMENT (OUTPATIENT)
Dept: PSYCHIATRY | Facility: CLINIC | Age: 29
End: 2024-01-25
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
## 2024-01-30 NOTE — PLAN
[FreeTextEntry4] : Individual's Overall Goal for Treatment (in patient's own words): "I would like to learn ways to get control over my anxiety to prevent emotional dysregulation/ psychiatric hospitalization."   Individual's Strengths and Skills Useful for Meeting Goals: adherent to treatment recommendations, Attempting to realize their potential, good impulse control, creative, motivated to participate in treatment, motivated and ready for change   Support Networks and/or Community Involvement: in good health, housing stability, English fluency, access to safe outdoor spaces, social supports   Barriers to treatment: "Nothing I am doing is working right now.". Problem/Need I:   Domain for Problem/Need I: Mental Health.   Problem: Bipolar 2, aspergers syndrom, personality disorder, panic disorder, insomnia due to mental condition.   Goal (In patient's words): To manage anxiety symptoms to decrease feelings of hopelessness, passive suicidal ideations, enhance quality of life and avoid psychiatric hospitalizations.   Status of Goal: Revised - Rationale:   Objective A: Mariela will utilize 2 skills/ strategies to support reduction of anxiety symptoms at least 1X/ day for the next 12 months.   Objective Target Date: 10/5/24   Status of Objective: Continued - Progress made:   Objective B: Mariela will take daily meds as prescribed and meet with psychiatrist/psychotherapist as scheduled.   Objective Target Date: 10/5/24   Status of Objective: Continued - Progress made:   Intervention(s): Therapist will utilize DBT skills. CBT techniques, psychoeducation, Motivational interviewing and Supportive therapy. Therapist will assess for changes in symptoms, including suicidal ideations/ self-harming urges and review/ revise safety plan as needed.   Medication Management: every 1 month or as needed . Dr. Harvey.   Individual Therapy: weekly or as indicated . Kaitlynn Campbell LCSW. [FreeTextEntry5] : Increase Lamictal to 200mg daily for mood stabilization. Risks and benefits reviewed Continue Prozac to 60mg daily to maximize antidepressant effect.  Continue DBT skills group Continue weekly therapy RTC Feb 2

## 2024-01-30 NOTE — REASON FOR VISIT
[Medical Office: ([facility])___] : The provider was located at the medical office in [unfilled]. [Patient preference] : as per patient preference [Home] : The patient, [unfilled], was located at home, [unfilled], at the time of the visit. [Verbal consent obtained from patient/other participant(s)] : Verbal consent for telehealth/telephonic services obtained from patient/other participant(s) [Patient] : Patient [FreeTextEntry4] : 3pm [FreeTextEntry5] : 3:25pm  [FreeTextEntry1] : "I'm hanging in there."

## 2024-01-30 NOTE — DISCUSSION/SUMMARY
[FreeTextEntry1] : Mariela is a 28-year-old female who lives at [place].  She is currently laid taking a break from school but plans to restart next semester.  She was last employed at [employer] about 4 months ago.  She has a history of depression and anxiety along with significant trauma currently enrolled in DBT. Upon interview today she reports her mood has improved. She reports continued low mood but thinks there has been a slight improvement. She denied thoughts of harming herself or others. She reports DBT is going well. Time spent reviewing the skills from DBT. Plan to

## 2024-01-31 ENCOUNTER — NON-APPOINTMENT (OUTPATIENT)
Age: 29
End: 2024-01-31

## 2024-01-31 NOTE — DISCUSSION/SUMMARY
[FreeTextEntry1] : This therapist made outreach to patient today following recent physical illness and financial stressors impacting recent attendance at DBT skills group sessions. Patient did not answer the phone. Therapist left a message on voicemail reminding patient of skills group session tomorrow/ requesting a call back to schedule next individual session.

## 2024-02-01 ENCOUNTER — APPOINTMENT (OUTPATIENT)
Dept: PSYCHIATRY | Facility: CLINIC | Age: 29
End: 2024-02-01

## 2024-02-01 ENCOUNTER — OUTPATIENT (OUTPATIENT)
Dept: OUTPATIENT SERVICES | Facility: HOSPITAL | Age: 29
LOS: 1 days | End: 2024-02-01
Payer: COMMERCIAL

## 2024-02-01 DIAGNOSIS — F31.81 BIPOLAR II DISORDER: ICD-10-CM

## 2024-02-01 DIAGNOSIS — F41.1 GENERALIZED ANXIETY DISORDER: ICD-10-CM

## 2024-02-01 PROCEDURE — 90853 GROUP PSYCHOTHERAPY: CPT

## 2024-02-01 NOTE — ADDENDUM
[FreeTextEntry1] : After group was finished, patient briefly met with therapist to schedule next individual session. Patient shared that she had "the urge to not come to group today" due to migraine headache but was able to refocus on commitment and previous recent absences and made wise-minded decision to come to group. Therapist reflected this as effective/ skillful decision, reinforcing this. Therapist offered patient individual therapy session appointment for next Tuesday 2/6 @ 9:30AM. No crisis/ concerns reported or exhibited in group session or brief meeting following group today.

## 2024-02-01 NOTE — DISCUSSION/SUMMARY
[Other: ___] : [unfilled] [Other: ____] : [unfilled] [Discussion with collaborating staff occurred since last visit.] : Discussion with collaborating staff occurred since last visit. [de-identified] : Dialectical Behavioral Therapy Skills Training Group [FreeTextEntry8] : Group leaders facilitated the DBT skills group in supportive, validating, non-judgmental setting. Mindfulness exercise was conducted on progressive muscle relaxation, focusing on awareness of physical sensations and ability to stay in the present moment. Reviewed ACCEPTS skills examples, providing skills shaping/ reinforcing. Group leaders introduced the TIPP skill, highlighting benefits of changing one's physiology when the sympathetic nervous system is activated; providing examples/ demonstrating practice of skill components and providing skills shaping/reinforcing. Homework was assigned on this skill to reinforce use.   [FreeTextEntry4] : Patient presented in-person for DBT skills group session today following recent group absences (due to financial difficulties impacting transportation along with recent illness). She participated in mindfulness exercise noting awareness of physical sensations but having a difficult time feeling "relaxed." Patient was reminded of goal of mindfulness being awareness and that relaxation is and added bonus, but not always experienced.  Patient appeared receptive to feedback and appeared engaged as other participants examples were shared during homework review. She was involved in the discussion and demonstrations as the TIPP skill was reviewed.  [de-identified] : Patient will complete homework on TIPP skill, Distress Tolerance Worksheet 4.  Patient will continue participation in weekly DBT skills group sessions as a part of DBT program.  Patient will continue to complete DBT diary card daily which will be reviewed weekly in individual session.  Patient will outreach therapist for crisis skills coaching, if needed.  Patient will remain compliant with medication as prescribed,

## 2024-02-02 ENCOUNTER — APPOINTMENT (OUTPATIENT)
Dept: PSYCHIATRY | Facility: CLINIC | Age: 29
End: 2024-02-02
Payer: COMMERCIAL

## 2024-02-02 ENCOUNTER — OUTPATIENT (OUTPATIENT)
Dept: OUTPATIENT SERVICES | Facility: HOSPITAL | Age: 29
LOS: 1 days | End: 2024-02-02
Payer: COMMERCIAL

## 2024-02-02 DIAGNOSIS — F41.1 GENERALIZED ANXIETY DISORDER: ICD-10-CM

## 2024-02-02 DIAGNOSIS — F41.0 GENERALIZED ANXIETY DISORDER: ICD-10-CM

## 2024-02-02 DIAGNOSIS — F31.81 BIPOLAR II DISORDER: ICD-10-CM

## 2024-02-02 PROCEDURE — 99214 OFFICE O/P EST MOD 30 MIN: CPT | Mod: 95

## 2024-02-03 DIAGNOSIS — F31.81 BIPOLAR II DISORDER: ICD-10-CM

## 2024-02-03 DIAGNOSIS — F41.1 GENERALIZED ANXIETY DISORDER: ICD-10-CM

## 2024-02-08 ENCOUNTER — APPOINTMENT (OUTPATIENT)
Dept: PSYCHIATRY | Facility: CLINIC | Age: 29
End: 2024-02-08

## 2024-02-14 ENCOUNTER — OUTPATIENT (OUTPATIENT)
Dept: OUTPATIENT SERVICES | Facility: HOSPITAL | Age: 29
LOS: 1 days | End: 2024-02-14
Payer: COMMERCIAL

## 2024-02-14 ENCOUNTER — APPOINTMENT (OUTPATIENT)
Dept: PSYCHIATRY | Facility: CLINIC | Age: 29
End: 2024-02-14

## 2024-02-14 DIAGNOSIS — F41.1 GENERALIZED ANXIETY DISORDER: ICD-10-CM

## 2024-02-14 DIAGNOSIS — F31.81 BIPOLAR II DISORDER: ICD-10-CM

## 2024-02-14 PROCEDURE — 90834 PSYTX W PT 45 MINUTES: CPT

## 2024-02-15 ENCOUNTER — APPOINTMENT (OUTPATIENT)
Dept: PSYCHIATRY | Facility: CLINIC | Age: 29
End: 2024-02-15

## 2024-02-15 DIAGNOSIS — F41.1 GENERALIZED ANXIETY DISORDER: ICD-10-CM

## 2024-02-15 DIAGNOSIS — F31.81 BIPOLAR II DISORDER: ICD-10-CM

## 2024-02-16 ENCOUNTER — NON-APPOINTMENT (OUTPATIENT)
Age: 29
End: 2024-02-16

## 2024-02-19 NOTE — PLAN
[Dialectical Behavior Therapy] : Dialectical Behavior Therapy  [Psychoeducation] : Psychoeducation  [Skills training (all types)] : Skills training (all types)  [de-identified] : Patient presented to virtual session today as scheduled. She reported, "I've just been really sick", when asked how she is doing. She acknowledged the toll that recent illness has taken on mental health treatment, along with school, adding that she has attended 2 days of school out of around 2.5 weeks since semester began. She reported feeling "extremely behind" with plans to reach out to professors via email by. In addition to recent physical symptoms (fatigue, cough and fever), patient also shared insight into avoidance related to fear/ anxiety, also impacting treatment and school. She endorsed decreased motivation and energy, reporting that her  has been motivating her to to follow through on work and treatment commitments. When asked about current motivation to continue in intensive DBT program, patient reported that her  represents 70% of current motivation, while she is at 30%. She shared lack of focus on/ use of skills due to recent illnesses. Patient alluded to chronic migraine headaches as significant interference along with lack of consistent access for stimulant medication (current ADHD medication being prescribed only for school). She appeared somewhat receptive to therapist's interventions of highlighting past value in skills, while also acknowledging how recent time out of treatment has made it difficult to re-engage.  She reported recent urge to self-harm in response to stressor involving , but "didn't act on it." She reported reaching out to friend to obtain support instead, noting this as helpful. Therapist made efforts to highlight this as an opportunity to utilize distress tolerance skills to tolerate emotion/ urge intensity.  [FreeTextEntry1] : Therapist will update treatment team, including Dr. Orourke of patient's report regarding medication.  Therapist will make outreach to patient on Friday to obtain update involving treatment decision.  Patient will remain compliant with medication as prescribed.  She will email professors regarding school absences due to recent illnesses. Patient encouraged to resume DBT skill use, including mindfulness and distress tolerance strategies.   Patient is aware that this therapist is out of the office the next 1.5 weeks. Patient agreed to contact DBT co-leader, Jorge Luis Lua or Dr. Orourke if in need for crisis/ coaching.    Next session scheduled: 3/1 @ 4:00PM via [employer]

## 2024-02-19 NOTE — RISK ASSESSMENT
[No, patient denies ideation or behavior] : No, patient denies ideation or behavior [FreeTextEntry8] : Patient reported the lack of suicidal ideation, reporting, "I haven't thought about it much." She denied any awareness of any recent passive suicidal ideations. At time of session, patient denied the presence of suicidal ideation, plan, intent or safety concerns. She reported urge to self-harm in response to stressor involving , but "didn't act on it." She reported reaching out to friend to obtain support instead, noting this as helpful. Therapist made efforts to highlight this as an opportunity to utilize distress tolerance skills to tolerate emotion/ urge intensity. [Low acute suicide risk] : Low acute suicide risk [No] : No [Not clinically indicated] : Safety Plan completed/updated (for individuals at risk): Not clinically indicated

## 2024-02-19 NOTE — DISCUSSION/SUMMARY
[FreeTextEntry1] : This therapist made outreach to patient this afternoon to obtain update regarding re-commitment to intensive DBT program. Patient did not answer the phone and therapist left a detailed message on patient's voicemail requesting a call back but also reminding patient that scheduled time off begins following the end of business day. Therapist reminded patient to make contact with group co-leader Jorge Luis Lua in this therapist's absence. DBT team, Jorge Luis Lua and Dr. Orourke are aware of recent patient update.

## 2024-02-19 NOTE — REASON FOR VISIT
[Patient preference] : as per patient preference [Telehealth (audio & video) - Individual/Group] : This visit was provided via telehealth using real-time 2-way audio visual technology. [Medical Office: ([facility])___] : The provider was located at the medical office in [unfilled]. [Home] : The patient, [unfilled], was located at home, [unfilled], at the time of the visit. [Verbal consent obtained from patient/other participant(s)] : Verbal consent for telehealth/telephonic services obtained from patient/other participant(s) [FreeTextEntry4] : 2:04PM [FreeTextEntry5] : 2:55PM [Patient] : Patient

## 2024-02-22 ENCOUNTER — APPOINTMENT (OUTPATIENT)
Dept: PSYCHIATRY | Facility: CLINIC | Age: 29
End: 2024-02-22

## 2024-02-29 ENCOUNTER — APPOINTMENT (OUTPATIENT)
Dept: PSYCHIATRY | Facility: CLINIC | Age: 29
End: 2024-02-29

## 2024-03-01 ENCOUNTER — APPOINTMENT (OUTPATIENT)
Dept: PSYCHIATRY | Facility: CLINIC | Age: 29
End: 2024-03-01
Payer: COMMERCIAL

## 2024-03-01 ENCOUNTER — OUTPATIENT (OUTPATIENT)
Dept: OUTPATIENT SERVICES | Facility: HOSPITAL | Age: 29
LOS: 1 days | End: 2024-03-01
Payer: COMMERCIAL

## 2024-03-01 DIAGNOSIS — F41.1 GENERALIZED ANXIETY DISORDER: ICD-10-CM

## 2024-03-01 DIAGNOSIS — F31.81 BIPOLAR II DISORDER: ICD-10-CM

## 2024-03-01 DIAGNOSIS — F60.9 PERSONALITY DISORDER, UNSPECIFIED: ICD-10-CM

## 2024-03-01 PROCEDURE — 99214 OFFICE O/P EST MOD 30 MIN: CPT | Mod: 95

## 2024-03-01 PROCEDURE — 90853 GROUP PSYCHOTHERAPY: CPT

## 2024-03-01 RX ORDER — LAMOTRIGINE 200 MG/1
200 TABLET ORAL DAILY
Qty: 30 | Refills: 0 | Status: ACTIVE | COMMUNITY
Start: 2024-01-03 | End: 1900-01-01

## 2024-03-01 RX ORDER — FLUOXETINE HYDROCHLORIDE 60 MG/1
60 TABLET ORAL DAILY
Qty: 30 | Refills: 0 | Status: ACTIVE | COMMUNITY
Start: 2023-12-19 | End: 1900-01-01

## 2024-03-02 DIAGNOSIS — F41.1 GENERALIZED ANXIETY DISORDER: ICD-10-CM

## 2024-03-02 DIAGNOSIS — F60.9 PERSONALITY DISORDER, UNSPECIFIED: ICD-10-CM

## 2024-03-02 DIAGNOSIS — F31.81 BIPOLAR II DISORDER: ICD-10-CM

## 2024-03-05 NOTE — PLAN
[FreeTextEntry4] : Individual's Overall Goal for Treatment (in patient's own words): "I would like to learn ways to get control over my anxiety to prevent emotional dysregulation/ psychiatric hospitalization."   Individual's Strengths and Skills Useful for Meeting Goals: adherent to treatment recommendations, Attempting to realize their potential, good impulse control, creative, motivated to participate in treatment, motivated and ready for change   Support Networks and/or Community Involvement: in good health, housing stability, English fluency, access to safe outdoor spaces, social supports   Barriers to treatment: "Nothing I am doing is working right now.". Problem/Need I:   Domain for Problem/Need I: Mental Health.   Problem: Bipolar 2, aspergers syndrom, personality disorder, panic disorder, insomnia due to mental condition.   Goal (In patient's words): To manage anxiety symptoms to decrease feelings of hopelessness, passive suicidal ideations, enhance quality of life and avoid psychiatric hospitalizations.   Status of Goal: Revised - Rationale:   Objective A: Mariela will utilize 2 skills/ strategies to support reduction of anxiety symptoms at least 1X/ day for the next 12 months.   Objective Target Date: 10/5/24   Status of Objective: Continued - Progress made:   Objective B: Mariela will take daily meds as prescribed and meet with psychiatrist/psychotherapist as scheduled.   Objective Target Date: 10/5/24   Status of Objective: Continued - Progress made:   Intervention(s): Therapist will utilize DBT skills. CBT techniques, psychoeducation, Motivational interviewing and Supportive therapy. Therapist will assess for changes in symptoms, including suicidal ideations/ self-harming urges and review/ revise safety plan as needed.   Medication Management: every 1 month or as needed . Dr. Harvey.   Individual Therapy: weekly or as indicated . Kaitlynn Campbell LCSW. [FreeTextEntry5] : Continue Lamictal 200mg daily for mood stabilization Continue Prozac to 60mg daily to maximize antidepressant effect and anti anxiety effect.   Patient will need to discuss her enrollment in DBT with .  Continue weekly therapy RTC in 4 weeks.

## 2024-03-05 NOTE — DISCUSSION/SUMMARY
[FreeTextEntry1] : Mariela is a 28-year-old female who lives at [place].  She is currently laid taking a break from school but plans to restart next semester.  She was last employed at [employer] about 4 months ago.  She has a history of depression and anxiety along with significant trauma currently enrolled in DBT. Upon interview today she reports her mood has improved. She reports continued low mood but thinks there has been a slight improvement. She denied thoughts of harming herself or others. She reports DBT is going well. Time spent reviewing the skills from DBT. Plan to

## 2024-03-05 NOTE — REASON FOR VISIT
[Patient preference] : as per patient preference [Medical Office: ([facility])___] : The provider was located at the medical office in [unfilled]. [Home] : The patient, [unfilled], was located at home, [unfilled], at the time of the visit. [Verbal consent obtained from patient/other participant(s)] : Verbal consent for telehealth/telephonic services obtained from patient/other participant(s) [FreeTextEntry4] : 1:30pm [Patient] : Patient [FreeTextEntry5] : 1:25pm [FreeTextEntry1] : "I'm still sick"
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
## 2024-03-05 NOTE — PHYSICAL EXAM
[Cooperative] : cooperative [Euthymic] : euthymic [Full] : full [Clear] : clear [Linear/Goal Directed] : linear/goal directed [Average] : average [FreeTextEntry8] : "OK" [WNL] : within normal limits

## 2024-03-08 ENCOUNTER — OUTPATIENT (OUTPATIENT)
Dept: OUTPATIENT SERVICES | Facility: HOSPITAL | Age: 29
LOS: 1 days | End: 2024-03-08
Payer: COMMERCIAL

## 2024-03-08 ENCOUNTER — APPOINTMENT (OUTPATIENT)
Dept: PSYCHIATRY | Facility: CLINIC | Age: 29
End: 2024-03-08

## 2024-03-08 DIAGNOSIS — F31.81 BIPOLAR II DISORDER: ICD-10-CM

## 2024-03-08 DIAGNOSIS — F60.9 PERSONALITY DISORDER, UNSPECIFIED: ICD-10-CM

## 2024-03-08 DIAGNOSIS — F41.1 GENERALIZED ANXIETY DISORDER: ICD-10-CM

## 2024-03-08 PROCEDURE — 90834 PSYTX W PT 45 MINUTES: CPT

## 2024-03-09 DIAGNOSIS — F60.9 PERSONALITY DISORDER, UNSPECIFIED: ICD-10-CM

## 2024-03-09 DIAGNOSIS — F41.1 GENERALIZED ANXIETY DISORDER: ICD-10-CM

## 2024-03-09 DIAGNOSIS — F31.81 BIPOLAR II DISORDER: ICD-10-CM

## 2024-03-12 ENCOUNTER — NON-APPOINTMENT (OUTPATIENT)
Age: 29
End: 2024-03-12

## 2024-03-12 NOTE — PHYSICAL EXAM
[Cooperative] : cooperative [Anxious] : anxious [Clear] : clear [Linear/Goal Directed] : linear/goal directed [WNL] : within normal limits [Average] : average [de-identified] : fair  [de-identified] : fair

## 2024-03-12 NOTE — REASON FOR VISIT
[Patient preference] : as per patient preference [Telehealth (audio & video) - Individual/Group] : This visit was provided via telehealth using real-time 2-way audio visual technology. [Medical Office: ([facility])___] : The provider was located at the medical office in [unfilled]. [Home] : The patient, [unfilled], was located at home, [unfilled], at the time of the visit. [Verbal consent obtained from patient/other participant(s)] : Verbal consent for telehealth/telephonic services obtained from patient/other participant(s) [Patient] : Patient [FreeTextEntry4] : 3:30PM [FreeTextEntry5] : 4:15PM

## 2024-03-12 NOTE — DISCUSSION/SUMMARY
[FreeTextEntry1] : Patient contacted MD to ask if she would be able to continue working with this provider if she is no longer continuing with DBT. She was informed of the practice which has been explained that MD is working with DBT patients while they are enrolled in the program but then patients are usually discharged back to their previous provider. She stated "it doesn't matter I'll just quit everything." She stated "if I can't work with you I'll just quit everything. Patient was assured that MD would work with her to find a treatment provider she was happy with. She had previously reported to this writer that she liked working with her last provider. When reminded of this, Pt reported "why does this matter." She was encouraged to keep her next appointment with this writer on 3/29. She denied any thoughts of suicide or harming herself and assured MD "I'll be fine." She was offered an earlier appointment for next week but declined. Plan to discuss further with DBT therapist and contact the patient to ensure a safe hand off.

## 2024-03-12 NOTE — END OF VISIT
[Duration of Psychotherapy Visit (minutes spent in synchronous communication): ____] : Duration of Psychotherapy Visit (minutes spent in synchronous communication): [unfilled] [Individual Psychotherapy for 38-52 minutes] : Individual Psychotherapy for 38 - 52 minutes [Licensed Clinician] : Licensed Clinician [Teletherapy Service Provided] : The services provided in this session were delivered via tele-therapy

## 2024-03-12 NOTE — PLAN
[Dialectical Behavior Therapy] : Dialectical Behavior Therapy  [Psychoeducation] : Psychoeducation  [Skills training (all types)] : Skills training (all types)  [de-identified] : Patient presented to session virtually today as scheduled. She reported experiencing increased migraines and continued symptoms of physical illness (i.e. fever, congestion, fatigue, dry cough). Patient reportedly met with her primary medical doctor this week and bloodwork came back as normal. Patient was reportedly prescribed medications for nausea and was told to stay hydrated. She shared how ongoing physical symptoms have impacted her schoolwork, as she reported, "I have done zero work." Patient shared that she has been medically excused from classes due to notes submitted by medical doctor. She reported experiencing some anxiety due to "being so behind" with school work, opting to withdraw if it would not impact her financially. Patient set goal of going to college on Monday with new doctors note (if feeling better or requesting additional note). She added that she would meet with financial office to inquire about payments required if she were to withdraw from classes. She shared insights that going to school would be effective choice (taking step to goal of helping financially, as she cannot currently work due to awaiting documents needed). Patient described mood as "the same." She reported experiencing anxiety at times and other times feeling no emotion. Patient reported recent experiencing some fleeting passive suicidal ideations triggered by migraine (patient encouraged to make appointment with neurologist). She denied any suicidal plan or intent. No safety concerns. Patient denied any self harming action, but did report experiencing urges this week (current urge intensity 1/5). She was able to highlight examples of distraction utilized to tolerate urges without acting on them. Patient reported compliance with medication as prescribed.

## 2024-03-12 NOTE — RISK ASSESSMENT
[Yes, patient reports ideation or behavior] : Yes, patient reports ideation or behavior [No, patient denies ideation or behavior] : No, patient denies ideation or behavior [Low acute suicide risk] : Low acute suicide risk [Yes] : Yes [FreeTextEntry8] : Patient reported recent experiencing some fleeting passive suicidal ideations triggered by migraine (patient encouraged to make appointment with neurologist). She denied any suicidal plan or intent. No safety concerns. Patient denied any self harming action, but did report experiencing urges this week (current urge intensity 1/5).  [FreeTextEntry3] : Safety plan reviewed- patient will notify / treatment team in event of worsening suicidal ideations or safety concerns. Patient agreed to call 911 or present to the nearest emergency room in the event of life-threatening emergency.

## 2024-03-13 NOTE — PHYSICAL EXAM
[Cooperative] : cooperative [Flat] : flat [Clear] : clear [Linear/Goal Directed] : linear/goal directed [WNL] : within normal limits [Average] : average [FreeTextEntry8] : apathetic- "a lack of feeling" [de-identified] : fair [de-identified] : fair

## 2024-03-13 NOTE — REASON FOR VISIT
[Patient preference] : as per patient preference [Telehealth (audio & video) - Individual/Group] : This visit was provided via telehealth using real-time 2-way audio visual technology. [Medical Office: ([facility])___] : The provider was located at the medical office in [unfilled]. [Home] : The patient, [unfilled], was located at home, [unfilled], at the time of the visit. [Verbal consent obtained from patient/other participant(s)] : Verbal consent for telehealth/telephonic services obtained from patient/other participant(s) [Patient] : Patient [FreeTextEntry5] : 4:54PM [FreeTextEntry4] : 4:07PM [FreeTextEntry1] : "I have 0 motivation for anything and everything."

## 2024-03-13 NOTE — PLAN
[Dialectical Behavior Therapy] : Dialectical Behavior Therapy  [Psychoeducation] : Psychoeducation  [Skills training (all types)] : Skills training (all types)  [Supportive Therapy] : Supportive Therapy [de-identified] : Therapist honed in on importance of daily routine, reviewing how this could help her mood and increase motivation. Therapist utilized motivational interviewing strategies to shift motivation. Therapist worked to explore/ highlight strategies to use to support toleration of intense emotions/ urges (i.e. TIPP skills, distraction exercises, self-soothing, etc.).   Patient presented to virtual session today as scheduled. She reported, "I have 0 motivation for anything." She also reported low energy/ motivation in the context of recent physical illness. Patient described mood as "apathetic", noting "lack of feeling." She denied feeling depressed but did allude to experiencing some anxiety/ "pressure in chest" in response to current stressor involving school. Patient acknowledged some motivation for change stating, "unless I do it (taking steps to go back to school/ obtain employment), things will get a lot worse" (in regards to financial and internal pressure on self). Patient reported, "I am just tired and don't want to deal with everything." She connected this to some passive suicidal ideations she has been experiencing. At time of session, patient denied the presence of suicidal ideations, plan, intent or safety concerns. She disclosed instance of self-harm behavior that occurred in context of distressing feelings "as a way to release emotions." This was focused on in session, as therapist worked to explore/ highlight strategies to use to support toleration of intense emotions/ urges (i.e. TIPP skills, distraction exercises, self-soothing, etc.).  At time of session, patient denied any urge/ intent to self-harm. [Recommended Frequency of Visits: ____] : Recommended frequency of visits: [unfilled] [Return in ____ week(s)] : Return in [unfilled] week(s) [FreeTextEntry1] : Patient was encouraged to follow daily routine and care for current illness. Patient was encouraged to utilize distress tolerance skills highlighted in session in response to self-harm urges. Patient will remain compliant with medication as prescribed.

## 2024-03-13 NOTE — RISK ASSESSMENT
[Yes, patient reports ideation or behavior] : Yes, patient reports ideation or behavior [No, patient denies ideation or behavior] : No, patient denies ideation or behavior [No] : No [Low acute suicide risk] : Low acute suicide risk [FreeTextEntry8] : Patient reported, "I am just tired and don't want to deal with everything." She connected this to some passive suicidal ideations she has been experiencing. At time of session, patient denied the presence of suicidal ideations, plan, intent or safety concerns. She disclosed instance of self-harm behavior that occurred in context of distressing feelings "as a way to release emotions." This was focused on in session. At time of session, patient denied any urge/ intent to self-harm. [Yes] : Yes [FreeTextEntry3] : Patient will utilize DBT distress tolerance skills in response to urges to self harm. She will utilize safety plan, if indicated and notify treatment team/  in the event of worsening mood/ crisis. She agreed to call [phone] or present to the nearest emergency room in the event of a life-threatening emergency.

## 2024-03-14 ENCOUNTER — NON-APPOINTMENT (OUTPATIENT)
Age: 29
End: 2024-03-14

## 2024-03-15 ENCOUNTER — APPOINTMENT (OUTPATIENT)
Dept: PSYCHIATRY | Facility: CLINIC | Age: 29
End: 2024-03-15

## 2024-03-21 NOTE — ED ADULT NURSE NOTE - PAIN RATING/NUMBER SCALE (0-10): ACTIVITY
Call back to pt's wife, Shobha. Explained the soonest appointment we have is 04/15/2024. Also informed of prior UA & PSA that would need to be completed.    Pt's wife verbalized understanding and states no other questions or concerns at this time.   
Patient's wife Shobha called regarding patient's upcoming appointment and has questions regarding that along with questions about procedure as well.    Please call Shobha to discuss: [phone]  Shobha has permission.  
3 (mild pain)

## 2024-03-26 PROBLEM — F41.1 GENERALIZED ANXIETY DISORDER WITH PANIC ATTACKS: Status: ACTIVE | Noted: 2023-07-21

## 2024-03-26 NOTE — DISCUSSION/SUMMARY
[FreeTextEntry1] : Mariela is a 28-year-old female who lives at [place].  She is currently laid taking a break from school but plans to restart next semester.  She was last employed at [employer] about 4 months ago.  She has a history of depression and anxiety along with significant trauma currently enrolled in DBT. Upon interview today she reports her mood remained the same. She denied safety concerns such as thoughts of wanting to harm herself or others. She reports plans to continue with the current medication regimen. She denied side effects from the current medication regimen.
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
## 2024-03-26 NOTE — PLAN
[FreeTextEntry4] : Individual's Overall Goal for Treatment (in patient's own words): "I would like to learn ways to get control over my anxiety to prevent emotional dysregulation/ psychiatric hospitalization."   Individual's Strengths and Skills Useful for Meeting Goals: adherent to treatment recommendations, Attempting to realize their potential, good impulse control, creative, motivated to participate in treatment, motivated and ready for change   Support Networks and/or Community Involvement: in good health, housing stability, English fluency, access to safe outdoor spaces, social supports   Barriers to treatment: "Nothing I am doing is working right now.". Problem/Need I:   Domain for Problem/Need I: Mental Health.   Problem: Bipolar 2, aspergers syndrom, personality disorder, panic disorder, insomnia due to mental condition.   Goal (In patient's words): To manage anxiety symptoms to decrease feelings of hopelessness, passive suicidal ideations, enhance quality of life and avoid psychiatric hospitalizations.   Status of Goal: Revised - Rationale:   Objective A: Mariela will utilize 2 skills/ strategies to support reduction of anxiety symptoms at least 1X/ day for the next 12 months.   Objective Target Date: 10/5/24   Status of Objective: Continued - Progress made:   Objective B: Mariela will take daily meds as prescribed and meet with psychiatrist/psychotherapist as scheduled.   Objective Target Date: 10/5/24   Status of Objective: Continued - Progress made:   Intervention(s): Therapist will utilize DBT skills. CBT techniques, psychoeducation, Motivational interviewing and Supportive therapy. Therapist will assess for changes in symptoms, including suicidal ideations/ self-harming urges and review/ revise safety plan as needed.   Medication Management: every 1 month or as needed . Dr. Harvey.   Individual Therapy: weekly or as indicated . Kaitlynn Campbell LCSW. [FreeTextEntry5] : Continue Lamictal to 200mg daily for mood stabilization. Risks and benefits reviewed Continue Prozac to 60mg daily to maximize antidepressant effect.  Continue DBT skills group Continue weekly therapy RTC Feb 2

## 2024-03-26 NOTE — REASON FOR VISIT
[Patient preference] : as per patient preference [Medical Office: ([facility])___] : The provider was located at the medical office in [unfilled]. [Home] : The patient, [unfilled], was located at home, [unfilled], at the time of the visit. [Verbal consent obtained from patient/other participant(s)] : Verbal consent for telehealth/telephonic services obtained from patient/other participant(s) [FreeTextEntry4] : 11am [Patient] : Patient [FreeTextEntry5] : 11:25am [FreeTextEntry1] : "I'm OK."

## 2024-03-29 ENCOUNTER — APPOINTMENT (OUTPATIENT)
Dept: PSYCHIATRY | Facility: CLINIC | Age: 29
End: 2024-03-29

## 2024-03-29 ENCOUNTER — NON-APPOINTMENT (OUTPATIENT)
Age: 29
End: 2024-03-29

## 2024-03-29 NOTE — DISCUSSION/SUMMARY
[FreeTextEntry1] : This therapist obtained update from Dr. Orourke that patient did not attend scheduled psychiatry appointment today as scheduled. Therapist made Dr. Orourke aware of active consent on file to contact patient's , Abhijit. He answered the phone informing therapist that Mariela is looking for another psychiatrist but was not aware of any updates pertaining to therapy. When asked, Abhijit denied any recent patient crisis or concerns. This therapist requested that he make Mariela aware of phone contact and that her ability to contact therapist if she would like to discuss resuming treatment. Therapist also initiated 10-day letter as additional form of outreach. Letter formulated on letterhead and placed in the outgoing mailbox. Due to it being late in the day on Friday, letter is dated 4/1/24.

## 2024-04-11 ENCOUNTER — NON-APPOINTMENT (OUTPATIENT)
Age: 29
End: 2024-04-11

## 2024-04-11 DIAGNOSIS — F31.81 BIPOLAR II DISORDER: ICD-10-CM

## 2024-04-11 DIAGNOSIS — F60.9 PERSONALITY DISORDER, UNSPECIFIED: ICD-10-CM

## 2024-04-12 NOTE — DISCUSSION/SUMMARY
[FreeTextEntry1] : Patient has not responded to outreach efforts including recent contact with  (written HIPPA consent on file) and 10-day letter being sent on 4/1. Due to lack of response, 30-day letter requested to be sent today. Therapist will complete discharge summary.

## 2024-04-19 PROBLEM — F60.9 PERSONALITY DISORDER: Status: ACTIVE | Noted: 2023-07-21

## 2024-04-19 PROBLEM — F31.81 BIPOLAR 2 DISORDER: Status: ACTIVE | Noted: 2023-07-21

## 2024-06-19 NOTE — ED ADULT NURSE NOTE - NSFALLRSKASSESSDT_ED_ALL_ED
AMBULATORY CASE MANAGEMENT NOTE    Names and Relationships of Patient/Support Persons: Caller: Lindy Ramos; Relationship: Other -     [facility] Interim Update    Spoke with Brea Alonzo and Lindy Ramos today, regarding receipt of of .    [facility] spent time at office looking through recent faxed paperwork for patient , as  Lindy Ramos had called on Friday requesting assistance to locate  and fax to supply company.    [facility] could not locate scanned copies in chart or note stating that either MAP 1000 or  had been signed by PCP or faxed.  After speaking with staff and completing extensive search for paperwork, it was found by medical staff and re-faxed.    [facility] spoke with both  and  and both forms have been received.  , Angela Alonzo states that she received  on 6/17/24.  Patient should receive supplies this week.    [facility] has not been able to secure in home therapy. [facility] paperwork printed and sent to office for provider to sign.  Office staff to notify daughter when paperwork is completed and ready to .  Social work referral placed for further assistance with transportation needs.        Education Documentation  No documentation found.        Rosanne GEIGER  Ambulatory Case Management    6/19/2024, 09:06 EDT  
25-Mar-2022 14:32

## 2024-07-19 NOTE — BH PATIENT PROFILE - NSVRISKABUSE_PSY_ALL_CORE
MEDICATION INTERACTION:    During your procedure you potentially received a medication or medications which may reduce the effectiveness of oral contraceptives. Please consider other forms of contraception for 1 month following your procedure if you are currently using oral contraceptives as your primary form of birth control. In addition to this, we recommend continuing your oral contraceptive as prescribed, unless otherwise instructed by your physician, during this time.    After general anesthesia or intravenous sedation, for 24 hours or while taking prescription Narcotics:  Limit your activities  A responsible adult needs to be with you for the next 24 hours  Do not drive and operate hazardous machinery  Do not make important personal or business decisions  Do not drink alcoholic beverages  If you have not urinated within 8 hours after discharge, and you are experiencing discomfort from urinary retention, please go to the nearest ED.  If you have sleep apnea and have a CPAP machine, please use it for all naps and sleeping.  Please use caution when taking narcotics and any of your home medications that may cause drowsiness.  *  Please give a list of your current medications to your Primary Care Provider.  *  Please update this list whenever your medications are discontinued, doses are      changed, or new medications (including over-the-counter products) are added.  *  Please carry medication information at all times in case of emergency situations.    These are general instructions for a healthy lifestyle:  No smoking/ No tobacco products/ Avoid exposure to second hand smoke  Surgeon General's Warning:  Quitting smoking now greatly reduces serious risk to your health.  Obesity, smoking, and sedentary lifestyle greatly increases your risk for illness  A healthy diet, regular physical exercise & weight monitoring are important for maintaining a healthy lifestyle    You may be retaining fluid if you have a history  No

## 2024-07-24 NOTE — BH PATIENT PROFILE - NSPROEDAABILITYLEARNOTHER_GEN_A_NUR
Have Your Spot(S) Been Treated In The Past?: has not been treated Hpi Title: Evaluation of Skin Lesions none

## 2024-12-18 NOTE — BH PATIENT PROFILE - NSVRISKSCORE_PSY_ALL_CORE
[FreeTextEntry1] :  By signing my name below, I, Edy Duong, attest that this document has been prepared under the direction and in the presence of Dr. Stallings.  I, Francisca Stallings MD, personally performed the services described in this documentation. All medical record entries made by the scribe were at my discretion and in my presence. I have reviewed the chart and discharge instructions (if applicable) and agree that the record reflects my personal permanence and is accurate and complete.   
1

## 2025-01-05 NOTE — ED BEHAVIORAL HEALTH ASSESSMENT NOTE - CURRENT MEDICATION
Other
She takes Cymbalta 60mh QAM and 30mg QHS, Abilify, Trazodone 50mg QHS, and Adderall. She is unsure if she is taking any other psychiatric medications and is unsure of the doses.
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
## 2025-01-07 NOTE — BH PATIENT PROFILE - NSBHAFFECT_PSY_A_CORE
Goal Outcome Evaluation:    Problem: Fall Injury Risk  Goal: Absence of Fall and Fall-Related Injury  Outcome: Progressing  Intervention: Identify and Manage Contributors  Recent Flowsheet Documentation  Taken 1/7/2025 1800 by Jessee Scott RN  Medication Review/Management: medications reviewed  Taken 1/7/2025 1647 by Jessee Scott RN  Medication Review/Management: medications reviewed  Taken 1/7/2025 1400 by Jessee Scott RN  Medication Review/Management: medications reviewed  Taken 1/7/2025 1230 by Jessee Scott RN  Medication Review/Management: medications reviewed  Taken 1/7/2025 1030 by Jessee Scott RN  Medication Review/Management: medications reviewed  Taken 1/7/2025 0845 by Jessee Scott RN  Medication Review/Management: medications reviewed  Intervention: Promote Injury-Free Environment  Recent Flowsheet Documentation  Taken 1/7/2025 1800 by Jessee Scott RN  Safety Promotion/Fall Prevention: safety round/check completed  Taken 1/7/2025 1647 by Jessee Scott RN  Safety Promotion/Fall Prevention: safety round/check completed  Taken 1/7/2025 1400 by Jessee Scott RN  Safety Promotion/Fall Prevention: safety round/check completed  Taken 1/7/2025 1230 by Jessee Scott RN  Safety Promotion/Fall Prevention: safety round/check completed  Taken 1/7/2025 1030 by Jessee Scott RN  Safety Promotion/Fall Prevention: safety round/check completed  Taken 1/7/2025 0845 by Jessee Scott RN  Safety Promotion/Fall Prevention: safety round/check completed   Plan of Care Reviewed With: patient alert to self, on 2 L nasal cannula for comfort, Q 2 turn, on tube feeds tolerated well, gave one time dose of albumin and iv lasix, suction on bedside.                                          appropriate to mood

## 2025-02-10 NOTE — ED ADULT TRIAGE NOTE - PRO INTERPRETER NEED 2
English
Go for blood tests as directed. Your doctor will do lab tests at regular visits to monitor the effects of this medicine. Please follow up with your doctor and keep your health care provider appointments.

## 2025-02-18 NOTE — ED ADULT NURSE NOTE - NS ED NOTE ABUSE SUSPICION NEGLECT YN
HPI   Chief Complaint   Patient presents with    Anxiety    Suicidal       HPI: Patient is a 36-year-old male, he has a past medical history of depression and anxiety, he is presenting to the emergency department for a psychiatric evaluation.  Patient reports that over the past several weeks he has had increasing anxiety, difficulty with sleep and sleep deprivation, some increasing depression and suicidal thoughts.  Has never acted on these.  He reports it is due to an accumulation of life things happening.  He reports increasing stress and anxiety related to these things.  He was initially on Zoloft but did not notice any improvement, was then switched to duloxetine and did not notice any improvement but noted that he started to feel worse and so stopped taking it.  Last took it on Saturday.  He also has been on trazodone at night which does not seem to be helping.  He does not currently have a psychiatrist, patient is not currently having any suicidal ideation at this time, is more concerned about anxiety and sleep.  He is presenting to the emergency department at the behest of family and friends due to their concern for the patient's mental and psychological wellbeing.  He denies any chest pain or shortness of breath, denies any palpitations, no vomiting, no diarrhea.  Has noticed some dry mouth, particularly when taking the trazodone.      ROS: Complete 12 point review of systems performed, otherwise negative except as noted in the history of present illness    PMH: Reviewed, documented below in note. Pertinents in HPI  PSH: Reviewed and documented below in note. Pertinents in HPI  SH: No illicits, not homeless  Fam: Reviewed, noncontributory to patients current complaint  MEDS: Reviewed and documented below in note. Pertinents in HPI  ALLERGIES: Reviewed and documented below in note.              History provided by:  Patient   used: No                          No data recorded            "       Patient History   History reviewed. No pertinent past medical history.  History reviewed. No pertinent surgical history.  No family history on file.  Social History     Tobacco Use    Smoking status: Former     Types: Cigarettes     Passive exposure: Past    Smokeless tobacco: Never   Substance Use Topics    Alcohol use: Yes     Comment: one beer a night    Drug use: Never       Physical Exam   Visit Vitals  /86   Pulse 59   Temp 36.1 °C (97 °F) (Temporal)   Resp 18   Ht 1.727 m (5' 8\")   Wt 81.6 kg (180 lb)   SpO2 96%   BMI 27.37 kg/m²   Smoking Status Former   BSA 1.98 m²      Physical Exam  Vitals and nursing note reviewed.   Constitutional:       Appearance: Normal appearance.   HENT:      Head: Normocephalic and atraumatic.      Mouth/Throat:      Mouth: Mucous membranes are dry.      Pharynx: Oropharynx is clear.   Eyes:      Extraocular Movements: Extraocular movements intact.      Pupils: Pupils are equal, round, and reactive to light.   Neck:      Vascular: No carotid bruit.   Cardiovascular:      Rate and Rhythm: Normal rate and regular rhythm.      Pulses: Normal pulses.      Heart sounds: Normal heart sounds.   Pulmonary:      Effort: Pulmonary effort is normal.      Breath sounds: Normal breath sounds.   Abdominal:      Palpations: Abdomen is soft.   Musculoskeletal:      Cervical back: Normal range of motion. No rigidity.   Skin:     General: Skin is warm and dry.      Capillary Refill: Capillary refill takes less than 2 seconds.   Neurological:      General: No focal deficit present.      Mental Status: He is alert and oriented to person, place, and time.      Sensory: No sensory deficit.      Motor: No weakness.   Psychiatric:         Mood and Affect: Mood normal.         Behavior: Behavior normal.         Thought Content: Thought content normal.         Judgment: Judgment normal.         No orders to display       Labs Reviewed   URINALYSIS MICROSCOPIC WITH REFLEX CULTURE - Abnormal     "   Result Value    WBC, Urine NONE      RBC, Urine 6-10 (*)     Mucus, Urine 3+      Calcium Oxalate Crystals, Urine 1+     DRUG SCREEN,URINE - Normal    Amphetamine Screen, Urine Presumptive Negative      Barbiturate Screen, Urine Presumptive Negative      Benzodiazepines Screen, Urine Presumptive Negative      Cannabinoid Screen, Urine Presumptive Negative      Cocaine Metabolite Screen, Urine Presumptive Negative      Fentanyl Screen, Urine Presumptive Negative      Opiate Screen, Urine Presumptive Negative      Oxycodone Screen, Urine Presumptive Negative      PCP Screen, Urine Presumptive Negative      Methadone Screen, Urine Presumptive Negative      Narrative:     Drug screen results are presumptive and should not be used to assess   compliance with prescribed medication. Contact the performing [facility] [place] laboratory   to add-on definitive confirmatory testing if clinically indicated.    Toxicology screening results are reported qualitatively. The concentration must   be greater than or equal to the cutoff to be reported as positive. The concentration   at which the screening test can detect an individual drug or metabolite varies.   The absence of expected drug(s) and/or drug metabolite(s) may indicate non-compliance,   inappropriate timing of specimen collection relative to drug administration, poor drug   absorption, diluted/adulterated urine, or limitations of testing. For medical purposes   only; not valid for forensic use.    Interpretive questions should be directed to the laboratory medical directors.   URINALYSIS WITH REFLEX CULTURE AND MICROSCOPIC - Normal    Color, Urine Yellow      Appearance, Urine Clear      Specific Gravity, Urine 1.032      pH, Urine 5.5      Protein, Urine 10 (TRACE)      Glucose, Urine Normal      Blood, Urine NEGATIVE      Ketones, Urine NEGATIVE      Bilirubin, Urine NEGATIVE      Urobilinogen, Urine Normal      Nitrite, Urine NEGATIVE      Leukocyte Esterase, Urine NEGATIVE      COMPREHENSIVE METABOLIC PANEL - Normal    Glucose 97      Sodium 137      Potassium 3.9      Chloride 101      Bicarbonate 30      Anion Gap 10      Urea Nitrogen 19      Creatinine 1.00      eGFR >90      Calcium 9.0      Albumin 4.5      Alkaline Phosphatase 67      Total Protein 7.1      AST 19      Bilirubin, Total 0.4      ALT 20     ACUTE TOXICOLOGY PANEL, BLOOD - Normal    Acetaminophen <10.0      Salicylate  <3      Alcohol <10     URINALYSIS WITH REFLEX CULTURE AND MICROSCOPIC    Narrative:     The following orders were created for panel order Urinalysis with Reflex Culture and Microscopic.  Procedure                               Abnormality         Status                     ---------                               -----------         ------                     Urinalysis with Reflex C...[[id number]]  Normal              Final result               Extra Urine Gray Tube[[id number]]                            Final result                 Please view results for these tests on the individual orders.   EXTRA URINE GRAY TUBE    Extra Tube Hold for add-ons.     CBC WITH AUTO DIFFERENTIAL    WBC 6.7      nRBC 0.0      RBC 5.04      Hemoglobin 15.7      Hematocrit 46.2      MCV 92      MCH 31.2      MCHC 34.0      RDW 12.0      Platelets 226      Neutrophils % 49.7      Immature Granulocytes %, Automated 0.1      Lymphocytes % 36.4      Monocytes % 10.9      Eosinophils % 2.2      Basophils % 0.7      Neutrophils Absolute 3.32      Immature Granulocytes Absolute, Automated 0.01      Lymphocytes Absolute 2.44      Monocytes Absolute 0.73      Eosinophils Absolute 0.15      Basophils Absolute 0.05           ED Course & MDM   Diagnoses as of 02/22/25 0653   Anxiety   Insomnia, unspecified type           Medical Decision Making  All mentioned lab results, ECGs, and imaging were independently reviewed by myself  - Patient evaluated. Patient is presenting to the emergency department today for a psychiatric evaluation.   Psychiatric clearance was performed with labs, and EKG.  EKG as interpreted by myself demonstrates sinus rhythm with ventricular rate of 63, normal axis, normal intervals, no evidence of an acute STEMI.  Patient at this time is medically cleared and EPAT was consulted.  At this time after EPAT's evaluation, they do not feel the patient warrants inpatient psychiatric stabilization and treatment and can be discharged.  They are recommending increasing the patient's trazodone and adding hydroxyzine.  These medications were ordered for the patient here in the emergency department, patient refusing the trazodone at present time.  Outpatient resources were provided as well as strict return precautions patient is discharged in otherwise stable condition.    - Monitored for any changes in stability or symptomatology. Patient remained stable.   - Counseled regarding labs, imaging, diagnosis, and plan. Patient was agreeable. All questions were answered. The patient was receptive and agreeable to the plan of care.   -The patient was instructed to return to the emergency department if any symptoms recurred, worsened, or if there were any additional concerns.    *Disclaimer: This note was dictated by speech recognition. Minor errors in transcription may be present. Please call with questions.    Amol Spears MD             Your medication list        START taking these medications        Instructions Last Dose Given Next Dose Due   hydrOXYzine HCL 25 mg tablet  Commonly known as: Atarax      Take 1 tablet (25 mg) by mouth every 6 hours if needed for anxiety for up to 3 days.              CHANGE how you take these medications        Instructions Last Dose Given Next Dose Due   traZODone 50 mg tablet  Commonly known as: Desyrel  What changed: how much to take      Take 2 tablets (100 mg) by mouth as needed at bedtime for sleep.              ASK your doctor about these medications        Instructions Last Dose Given Next Dose  Due   DULoxetine 30 mg DR capsule  Commonly known as: Cymbalta      Take 1 capsule (30 mg) by mouth once daily. Do not crush or chew.       mupirocin 2 % ointment  Commonly known as: Bactroban      apply THREE TIMES DAILY FOR 10 DAYS                 Where to Get Your Medications        These medications were sent to [id number] - [facility] [place] - [address].  [address] [place] [place] [facility] [address]      Phone: [phone]   hydrOXYzine HCL 25 mg tablet  traZODone 50 mg tablet         Procedure  Procedures     *This report was transcribed using voice recognition software.  Every effort was made to ensure accuracy; however, inadvertent computerized transcription errors may be present.*  Jean Spears MD  02/22/25         Jean Spears MD  02/22/25 3258     No